# Patient Record
Sex: MALE | Race: WHITE | NOT HISPANIC OR LATINO | Employment: OTHER | URBAN - METROPOLITAN AREA
[De-identification: names, ages, dates, MRNs, and addresses within clinical notes are randomized per-mention and may not be internally consistent; named-entity substitution may affect disease eponyms.]

---

## 2017-07-12 DIAGNOSIS — I67.1 CEREBRAL ANEURYSM: ICD-10-CM

## 2017-07-12 NOTE — LETTER
[facility]  [address] [place], [place], [address]  [phone]        July 13, 2017    Vladimir Morse                                                                                                                                   [facility] [address] [facility] [address]            We recently received a call from your pharmacy requesting a refill of Ramipril.     A review of your chart indicates that an appointment is required with your provider for 6 month med check per Dr. Hinkle-was due 5/12/17. Please call the clinic at [phone] to schedule your appointment.     We have authorized one 30 day refill of your medication to allow time for you to schedule your appointment.      Taking care of your health is important to us and ongoing visits with your provider are vital to your care.  We look forward to seeing you in the near future.     Sincerely,     [facility]

## 2017-07-13 RX ORDER — RAMIPRIL 2.5 MG/1
CAPSULE ORAL
Qty: 30 CAPSULE | Refills: 0 | Status: SHIPPED | OUTPATIENT
Start: 2017-07-13 | End: 2017-07-21

## 2017-07-13 NOTE — TELEPHONE ENCOUNTER
"Not PSO diagnosis.  6 or 12 month visit?  Please advise.  Angelica Vera RN      Associated Diagnoses      Cerebral aneurysm [I67.1]             12/12/16  ASSESSMENT/PLAN:      1. Routine general medical examination at a health care facility  discsussed  - Hepatitis C Screen Reflex to HCV RNA Quant and Genotype     2. Cerebral aneurysm  Keep BP controlled  - ramipril (ALTACE) 2.5 MG capsule; Take 1 capsule (2.5 mg) by mouth At Bedtime  Dispense: 90 capsule; Refill: 1     3. Hyperlipidemia LDL goal <100  refill  - atorvastatin (LIPITOR) 40 MG tablet; Take 1 tablet (40 mg) by mouth daily Due for fasting physical  Dispense: 90 tablet; Refill: 3  - Lipid Profile (Chol, Trig, HDL, LDL calc)  - Comprehensive metabolic panel     4. Primary osteoarthritis of both knees  refill  - meloxicam (MOBIC) 15 MG tablet; Take 1 tablet (15 mg) by mouth daily  Dispense: 90 tablet; Refill: 1     5. Need for hepatitis C screening test  again  - Hepatitis C Screen Reflex to HCV RNA Quant and Genotype        COUNSELING:  Reviewed preventive health counseling, as reflected in patient instructions       Regular exercise       Healthy diet/nutrition       Vision screening     BP Screening:   Last 3 BP Readings:        BP Readings from Last 3 Encounters:   12/12/16 134/81   12/07/16 142/86   07/18/16 130/70         The following was recommended to the patient:  Re-screen BP within a year and recommended lifestyle modifications        reports that he has never smoked. He has never used smokeless tobacco.    Estimated body mass index is 30.3 kg/(m^2) as calculated from the following:    Height as of this encounter: 5' 8.1\" (1.73 m).    Weight as of this encounter: 199 lb 14.4 oz (90.674 kg).         Counseling Resources:  ATP IV Guidelines  Pooled Cohorts Equation Calculator  FRAX Risk Assessment  ICSI Preventive Guidelines  Dietary Guidelines for Americans, 2010  USDA's MyPlate  ASA Prophylaxis  Lung CA Screening     Reyes Hinkle, " MD  [facility]

## 2017-07-13 NOTE — TELEPHONE ENCOUNTER
ramipril (ALTACE) 2.5 MG capsule    Last Written Prescription Date: 12/12/2016  Last Fill Quantity: 90,04/21/2017 # refills: 1  Last Office Visit with [facility] or [facility] prescribing provider: 12/12/2016-Dr Hinkle       Potassium   Date Value Ref Range Status   12/12/2016 3.8 3.4 - 5.3 mmol/L Final     Creatinine   Date Value Ref Range Status   12/12/2016 0.66 0.66 - 1.25 mg/dL Final     BP Readings from Last 3 Encounters:   12/12/16 134/81   12/07/16 142/86   07/18/16 130/70

## 2017-07-21 ENCOUNTER — OFFICE VISIT (OUTPATIENT)
Dept: FAMILY MEDICINE | Facility: CLINIC | Age: 62
End: 2017-07-21
Payer: COMMERCIAL

## 2017-07-21 VITALS
TEMPERATURE: 97.6 F | DIASTOLIC BLOOD PRESSURE: 83 MMHG | HEART RATE: 56 BPM | BODY MASS INDEX: 30.37 KG/M2 | RESPIRATION RATE: 14 BRPM | HEIGHT: 68 IN | WEIGHT: 200.4 LBS | SYSTOLIC BLOOD PRESSURE: 138 MMHG

## 2017-07-21 DIAGNOSIS — E78.5 HYPERLIPIDEMIA LDL GOAL <100: ICD-10-CM

## 2017-07-21 DIAGNOSIS — D23.5 BENIGN NEOPLASM OF SKIN OF TRUNK, EXCEPT SCROTUM: ICD-10-CM

## 2017-07-21 DIAGNOSIS — M17.0 PRIMARY OSTEOARTHRITIS OF BOTH KNEES: Primary | ICD-10-CM

## 2017-07-21 DIAGNOSIS — I67.1 CEREBRAL ANEURYSM: ICD-10-CM

## 2017-07-21 PROCEDURE — 17110 DESTRUCTION B9 LES UP TO 14: CPT | Performed by: FAMILY MEDICINE

## 2017-07-21 PROCEDURE — 99213 OFFICE O/P EST LOW 20 MIN: CPT | Mod: 25 | Performed by: FAMILY MEDICINE

## 2017-07-21 RX ORDER — MELOXICAM 15 MG/1
15 TABLET ORAL DAILY
Qty: 90 TABLET | Refills: 1 | Status: SHIPPED | OUTPATIENT
Start: 2017-07-21 | End: 2018-02-22

## 2017-07-21 RX ORDER — ATORVASTATIN CALCIUM 40 MG/1
40 TABLET, FILM COATED ORAL DAILY
Qty: 90 TABLET | Refills: 1 | Status: SHIPPED | OUTPATIENT
Start: 2017-07-21 | End: 2018-04-30

## 2017-07-21 RX ORDER — RAMIPRIL 2.5 MG/1
CAPSULE ORAL
Qty: 90 CAPSULE | Refills: 1 | Status: SHIPPED | OUTPATIENT
Start: 2017-07-21 | End: 2018-01-26

## 2017-07-21 NOTE — PROGRESS NOTES
"  SUBJECTIVE:                                                    Vladimir Morse is a 61 year old male who presents to clinic today for the following health issues:      Medication Followup of  Ramipril     Taking Medication as prescribed: yes    Side Effects:  None    Medication Helping Symptoms:  yes         Would like to know your opinion on Double knee surgery. Ortho proposes same  Mole on rt temple     Benign neoplasm of skin of trunk, except scrotum  :The patient noticed a \"mole\" on his right temple. May be growing, duration unclear    Primary osteoarthritis of both knees: The patient had an ortho appointment where they suggested a double TKR, he was wondering whether it was a good idea.     Hyperlipidemia LDL goal <100: The patient is on a statin.       Cerebral aneurysm:excellent recovery    Problem list and histories reviewed & adjusted, as indicated.  Additional history: as documentedas documented        Reviewed and updated as needed this visit by clinical staffTobacco  Allergies  Meds  Med Hx  Surg Hx  Fam Hx  Soc Hx       Past Medical History:   Diagnosis Date     BPPV (benign paroxysmal positional vertigo), unspecified laterality 7/3/2015     Cerebral aneurysm 8/4/2011    atherosclerotic     DIVERTICULITIS OF COLON W/O BLEED 6/29/2006     Gilbert's syndrome 12/13/2016     Hyperlipidemia LDL goal <100 11/28/2014     Knee pain 2/6/2009     Lipoma of skin and subcutaneous tissue 6/22/2012     Nephrolithiasis 6/22/2012     Pain in joint, shoulder region 9/18/2010     Presbycusis 6/22/2012     SBO (small bowel obstruction) (H) 7/27/2012     TIA (transient ischaemic attack)      Unspecified intestinal obstruction (H)     diverticulitis       Past Surgical History:   Procedure Laterality Date     ARTHROSCOPY KNEE Right 10/26/2015    Procedure: ARTHROSCOPY KNEE;  Surgeon: Rodrigue Cole MD;  Location: [facility] OR     C NONSPECIFIC PROCEDURE      Perforated bowel; infant     COLONOSCOPY  11/23/2015    " "Dr. Freedman RED     CYSTOSCOPY, RETROGRADES, INSERT STENT URETER(S), COMBINED  6/29/2012    Procedure: COMBINED CYSTOSCOPY, RETROGRADES, INSERT STENT URETER(S);  LEFT URETEROSCOPY, LEFT URETERAL STENT PLACEMENT;  Surgeon: Timothy Ortega MD;  Location:  OR     EXTRACORPOREAL SHOCK WAVE LITHOTRIPSY (ESWL)  12/20/2012    Procedure: EXTRACORPOREAL SHOCK WAVE LITHOTRIPSY (ESWL);  LEFT EXTRACORPOREAL SHOCKWAVE LITHOTRIPSY ;  Surgeon: Timothy Ortega MD;  Location:  OR     ORTHOPEDIC SURGERY      knee cyst removed     TONSILLECTOMY      T&A as a child       Family History   Problem Relation Age of Onset     Breast Cancer Mother      Cancer - colorectal Father      Breast Cancer Sister      Colon Cancer No family hx of        Social History   Substance Use Topics     Smoking status: Never Smoker     Smokeless tobacco: Never Used     Alcohol use Yes      Comment: rarely       Reviewed and updated as needed this visit by Provider         ROS: Great mood, no itch bleeding    This document serves as a record of the services and decisions personally performed and made by Reyes Hinkle MD. It was created on his behalf by Marifer Gunderson, a trained medical scribe.  The creation of this document is based on the scribe's personal observations and the provider's statements to the medical scribe.  Marifer Gunderson, July 21, 2017 3:13 PM    OBJECTIVE:     /83 (BP Location: Right arm, Patient Position: Chair, Cuff Size: Adult Regular)  Pulse 56  Temp 97.6  F (36.4  C) (Oral)  Resp 14  Ht 1.73 m (5' 8.1\")  Wt 90.9 kg (200 lb 6.4 oz)  BMI 30.38 kg/m2  Body mass index is 30.38 kg/(m^2).    Exam:  Skin: Typical seborrheic keratosis with keratin plug on right temple    Procedure:  Cryotherapy applied  - Care discussed      ASSESSMENT/PLAN:           (M17.0) Primary osteoarthritis of both knees  Comment: discussed at length  Plan: meloxicam (MOBIC) 15 MG tablet            (E78.5) Hyperlipidemia LDL goal <100  Comment:   Recent " Labs   Lab Test  12/12/16   1129  10/12/15   1808  11/28/14   1148   CHOL  123  166  150   HDL  46  46  51   LDL  53  95  85   TRIG  119  125  71   CHOLHDLRATIO   --   3.6  2.9       Plan: atorvastatin (LIPITOR) 40 MG tablet            (I67.1) Cerebral aneurysm  Comment: refill Excellent BP  Plan: ramipril (ALTACE) 2.5 MG capsule            Piter K, nature, artie history, discussed Cryo applied          The information in this document, created by the medical scribe for me, accurately reflects the services I personally performed and the decisions made by me. I have reviewed and approved this document for accuracy prior to leaving the patient care area.  Reyes Hinkle MD July 21, 2017 3:14 PM    Reyes Hinkle MD  [facility]

## 2017-07-21 NOTE — NURSING NOTE
"Chief Complaint   Patient presents with     Recheck Medication     Ramipril     Knee left     wondering about double knee surgery, your thoughts      Derm Problem     mole on rt temple        Initial /83 (BP Location: Right arm, Patient Position: Chair, Cuff Size: Adult Regular)  Pulse 56  Temp 97.6  F (36.4  C) (Oral)  Resp 14  Ht 5' 8.1\" (1.73 m)  Wt 200 lb 6.4 oz (90.9 kg)  BMI 30.38 kg/m2 Estimated body mass index is 30.38 kg/(m^2) as calculated from the following:    Height as of this encounter: 5' 8.1\" (1.73 m).    Weight as of this encounter: 200 lb 6.4 oz (90.9 kg).  Medication Reconciliation: complete rt arm Magda Pop MA      "

## 2017-07-21 NOTE — MR AVS SNAPSHOT
"              After Visit Summary   2017    Vladimir Morse    MRN: [id number]           Patient Information     Date Of Birth          1955        Visit Information        Provider Department      2017 3:45 PM Reyes Hinkle MD [facility]        Today's Diagnoses     Primary osteoarthritis of both knees    -  1    Hyperlipidemia LDL goal <100        Cerebral aneurysm        Benign neoplasm of skin of trunk, except scrotum           Follow-ups after your visit        Who to contact     If you have questions or need follow up information about today's clinic visit or your schedule please contact [facility] directly at [phone].  Normal or non-critical lab and imaging results will be communicated to you by Valentin Uzhunhart, letter or phone within 4 business days after the clinic has received the results. If you do not hear from us within 7 days, please contact the clinic through MyChart or phone. If you have a critical or abnormal lab result, we will notify you by phone as soon as possible.  Submit refill requests through [employer] or call your pharmacy and they will forward the refill request to us. Please allow 3 business days for your refill to be completed.          Additional Information About Your Visit        MyChart Information     [employer] lets you send messages to your doctor, view your test results, renew your prescriptions, schedule appointments and more. To sign up, go to [email or web address] . Click on \"Log in\" on the left side of the screen, which will take you to the Welcome page. Then click on \"Sign up Now\" on the right side of the page.     You will be asked to enter the access code listed below, as well as some personal information. Please follow the directions to create your username and password.     Your access code is: [email or web address]  Expires: 10/19/2017  3:25 PM     Your access code will  in 90 days. If you need help or a new code, please call " "your [place] clinic or [phone].        Care EveryWhere ID     This is your Care EveryWhere ID. This could be used by other organizations to access your [place] medical records  [id number]        Your Vitals Were     Pulse Temperature Respirations Height BMI (Body Mass Index)       56 97.6  F (36.4  C) (Oral) 14 5' 8.1\" (1.73 m) 30.38 kg/m2        Blood Pressure from Last 3 Encounters:   07/21/17 138/83   12/12/16 134/81   12/07/16 142/86    Weight from Last 3 Encounters:   07/21/17 200 lb 6.4 oz (90.9 kg)   12/12/16 199 lb 14.4 oz (90.7 kg)   12/07/16 199 lb (90.3 kg)              We Performed the Following     DESTRUCT BENIGN LESION, UP TO 14          Today's Medication Changes          These changes are accurate as of: 7/21/17  8:43 PM.  If you have any questions, ask your nurse or doctor.               These medicines have changed or have updated prescriptions.        Dose/Directions    atorvastatin 40 MG tablet   Commonly known as:  LIPITOR   This may have changed:  additional instructions   Used for:  Hyperlipidemia LDL goal <100   Changed by:  Reyes Hinkle MD        Dose:  40 mg   Take 1 tablet (40 mg) by mouth daily   Quantity:  90 tablet   Refills:  1       ramipril 2.5 MG capsule   Commonly known as:  ALTACE   This may have changed:  See the new instructions.   Used for:  Cerebral aneurysm   Changed by:  Reyes Hinkle MD        TAKE ONE CAPSULE BY MOUTH AT BEDTIME   Quantity:  90 capsule   Refills:  1         Stop taking these medicines if you haven't already. Please contact your care team if you have questions.     traMADol 50 MG tablet   Commonly known as:  ULTRAM   Stopped by:  Reyes Hinkle MD                Where to get your medicines      These medications were sent to [facility] - [address] [address]     Phone:  [phone]     atorvastatin 40 MG tablet    meloxicam 15 MG tablet    ramipril 2.5 MG capsule                " Primary Care Provider Office Phone # Fax #    Reyes Hinkle [phone]       [facility] [address]  [facility] [address]        Equal Access to Services     LAURA GREENE : Clara rangel ku aguso Soomaali, waaxda luqadaha, qaybta kaalmada adeegyada, josselyn colen marie núñez laAdamariselaine vazquez. So [facility] [phone].    ATENCIÓN: Si habla español, tiene a wright disposición servicios gratuitos de asistencia lingüística. Llame al [phone].    We comply with applicable federal civil rights laws and [place] laws. We do not discriminate on the basis of race, color, national origin, age, disability sex, sexual orientation or gender identity.            Thank you!     Thank you for choosing [facility]  for your care. Our goal is always to provide you with excellent care. Hearing back from our patients is one way we can continue to improve our services. Please take a few minutes to complete the written survey that you may receive in the mail after your visit with us. Thank you!             Your Updated Medication List - Protect others around you: Learn how to safely use, store and throw away your medicines at www.disposemymeds.org.          This list is accurate as of: 7/21/17  8:43 PM.  Always use your most recent med list.                   Brand Name Dispense Instructions for use Diagnosis    aspirin 81 MG EC tablet      Take 81 mg by mouth daily.        atorvastatin 40 MG tablet    LIPITOR    90 tablet    Take 1 tablet (40 mg) by mouth daily    Hyperlipidemia LDL goal <100       IBUPROFEN PO           meloxicam 15 MG tablet    MOBIC    90 tablet    Take 1 tablet (15 mg) by mouth daily    Primary osteoarthritis of both knees       ramipril 2.5 MG capsule    ALTACE    90 capsule    TAKE ONE CAPSULE BY MOUTH AT BEDTIME    Cerebral aneurysm

## 2018-01-26 DIAGNOSIS — I67.1 CEREBRAL ANEURYSM: ICD-10-CM

## 2018-01-26 NOTE — LETTER
[facility]  [address] [place], [address]  [phone]        January 29, 2018    Vladimir Morse                                                                                                                                   [address] [facility] [address]            We recently received a call from your pharmacy requesting a refill of Ramipril.     You are due for a fasting lipid panel and a comprehensive metabolic panel.  I see you are coming for a pre-op 2/19/18.  Please come fasting to that appointment or reschedule to a time earlier in the day if 10 am too late for you to fast.  Please call the clinic at [phone] to reschedule your appointment.     We have authorized one refill of your medication to allow time for you to schedule your appointment.      Taking care of your health is important to us and ongoing visits with your provider are vital to your care.  We look forward to seeing you in the near future.     Sincerely,     [facility]

## 2018-01-27 NOTE — TELEPHONE ENCOUNTER
"Requested Prescriptions   Pending Prescriptions Disp Refills     ramipril (ALTACE) 2.5 MG capsule [Pharmacy Med Name: RAMIPRIL 2.5MG CAPS] 90 capsule 1    Last Written Prescription Date:  07/21/2017  Last Fill Quantity: 90 CAPSULE,  # refills: 1   Last Office Visit with [facility] provider:  07/21/2017   Future Office Visit:    Next 5 appointments (look out 90 days)     Feb 19, 2018 10:00 AM CST   Pre-Op physical with Reyes Hinkle MD   [facility]    [address] [place] [address] [phone]                  Sig: TAKE ONE CAPSULE BY MOUTH AT BEDTIME    ACE Inhibitors (Including Combos) Protocol Failed    1/26/2018  4:12 PM       Failed - Normal serum creatinine on file in past 12 months    Recent Labs   Lab Test  12/12/16   1129   CR  0.66            Failed - Normal serum potassium on file in past 12 months    Recent Labs   Lab Test  12/12/16   1129   POTASSIUM  3.8            Passed - Blood pressure under 140/90    BP Readings from Last 3 Encounters:   07/21/17 138/83   12/12/16 134/81   12/07/16 142/86                Passed - Recent or future visit with authorizing provider's specialty    Patient had office visit in the last year or has a visit in the next 30 days with authorizing provider.  See \"Patient Info\" tab in inbasket, or \"Choose Columns\" in Meds & Orders section of the refill encounter.            Passed - Patient is age 18 or older          Cristofer COLVIN  "

## 2018-01-29 RX ORDER — RAMIPRIL 2.5 MG/1
CAPSULE ORAL
Qty: 90 CAPSULE | Refills: 0 | Status: SHIPPED | OUTPATIENT
Start: 2018-01-29 | End: 2018-02-22

## 2018-01-29 NOTE — TELEPHONE ENCOUNTER
BMP was due 12/12/17.  Also was due for FLP 12/12/17.  Has pre-op scheduled for 2/19/18.  OK to fill til then and send letter to come fasting to appt for labs?  Please advise.  Angelica Vera RN

## 2018-02-22 ENCOUNTER — OFFICE VISIT (OUTPATIENT)
Dept: FAMILY MEDICINE | Facility: CLINIC | Age: 63
End: 2018-02-22
Payer: COMMERCIAL

## 2018-02-22 VITALS
TEMPERATURE: 97.6 F | SYSTOLIC BLOOD PRESSURE: 160 MMHG | HEIGHT: 68 IN | HEART RATE: 91 BPM | DIASTOLIC BLOOD PRESSURE: 93 MMHG | WEIGHT: 202 LBS | RESPIRATION RATE: 14 BRPM | BODY MASS INDEX: 30.62 KG/M2

## 2018-02-22 DIAGNOSIS — I67.1 CEREBRAL ANEURYSM: ICD-10-CM

## 2018-02-22 DIAGNOSIS — Z01.818 PREOP GENERAL PHYSICAL EXAM: Primary | ICD-10-CM

## 2018-02-22 DIAGNOSIS — I10 ESSENTIAL HYPERTENSION, BENIGN: ICD-10-CM

## 2018-02-22 DIAGNOSIS — E78.5 HYPERLIPIDEMIA LDL GOAL <100: ICD-10-CM

## 2018-02-22 LAB
ALBUMIN UR-MCNC: ABNORMAL MG/DL
APPEARANCE UR: CLEAR
BASOPHILS # BLD AUTO: 0 10E9/L (ref 0–0.2)
BASOPHILS NFR BLD AUTO: 0.3 %
BILIRUB UR QL STRIP: NEGATIVE
COLOR UR AUTO: YELLOW
DIFFERENTIAL METHOD BLD: NORMAL
EOSINOPHIL # BLD AUTO: 0.1 10E9/L (ref 0–0.7)
EOSINOPHIL NFR BLD AUTO: 0.9 %
ERYTHROCYTE [DISTWIDTH] IN BLOOD BY AUTOMATED COUNT: 14 % (ref 10–15)
GLUCOSE UR STRIP-MCNC: NEGATIVE MG/DL
HCT VFR BLD AUTO: 48.6 % (ref 40–53)
HGB BLD-MCNC: 15.5 G/DL (ref 13.3–17.7)
HGB UR QL STRIP: ABNORMAL
KETONES UR STRIP-MCNC: ABNORMAL MG/DL
LEUKOCYTE ESTERASE UR QL STRIP: NEGATIVE
LYMPHOCYTES # BLD AUTO: 2.5 10E9/L (ref 0.8–5.3)
LYMPHOCYTES NFR BLD AUTO: 23.7 %
MCH RBC QN AUTO: 29.2 PG (ref 26.5–33)
MCHC RBC AUTO-ENTMCNC: 31.9 G/DL (ref 31.5–36.5)
MCV RBC AUTO: 92 FL (ref 78–100)
MONOCYTES # BLD AUTO: 0.6 10E9/L (ref 0–1.3)
MONOCYTES NFR BLD AUTO: 5.9 %
MUCOUS THREADS #/AREA URNS LPF: PRESENT /LPF
NEUTROPHILS # BLD AUTO: 7.2 10E9/L (ref 1.6–8.3)
NEUTROPHILS NFR BLD AUTO: 69.2 %
NITRATE UR QL: NEGATIVE
PH UR STRIP: 6 PH (ref 5–7)
PLATELET # BLD AUTO: 273 10E9/L (ref 150–450)
RBC # BLD AUTO: 5.31 10E12/L (ref 4.4–5.9)
RBC #/AREA URNS AUTO: ABNORMAL /HPF
SOURCE: ABNORMAL
SP GR UR STRIP: >1.03 (ref 1–1.03)
UROBILINOGEN UR STRIP-ACNC: 0.2 EU/DL (ref 0.2–1)
WBC # BLD AUTO: 10.4 10E9/L (ref 4–11)
WBC #/AREA URNS AUTO: ABNORMAL /HPF

## 2018-02-22 PROCEDURE — 36415 COLL VENOUS BLD VENIPUNCTURE: CPT | Performed by: FAMILY MEDICINE

## 2018-02-22 PROCEDURE — 93000 ELECTROCARDIOGRAM COMPLETE: CPT | Performed by: FAMILY MEDICINE

## 2018-02-22 PROCEDURE — 99214 OFFICE O/P EST MOD 30 MIN: CPT | Performed by: FAMILY MEDICINE

## 2018-02-22 PROCEDURE — 85025 COMPLETE CBC W/AUTO DIFF WBC: CPT | Performed by: FAMILY MEDICINE

## 2018-02-22 PROCEDURE — 80053 COMPREHEN METABOLIC PANEL: CPT | Performed by: FAMILY MEDICINE

## 2018-02-22 PROCEDURE — 81001 URINALYSIS AUTO W/SCOPE: CPT | Performed by: FAMILY MEDICINE

## 2018-02-22 PROCEDURE — 80061 LIPID PANEL: CPT | Performed by: FAMILY MEDICINE

## 2018-02-22 RX ORDER — RAMIPRIL 5 MG/1
5 CAPSULE ORAL DAILY
Qty: 90 CAPSULE | Refills: 1 | Status: SHIPPED | OUTPATIENT
Start: 2018-02-22 | End: 2018-04-13

## 2018-02-22 NOTE — PATIENT INSTRUCTIONS
Before Your Surgery      Call your surgeon if there is any change in your health. This includes signs of a cold or flu (such as a sore throat, runny nose, cough, rash or fever).    Do not smoke, drink alcohol or take over the counter medicine (unless your surgeon or primary care doctor tells you to) for the 24 hours before and after surgery.    If you take prescribed drugs: Follow your doctor s orders about which medicines to take and which to stop until after surgery.    Eating and drinking prior to surgery: follow the instructions from your surgeon    Take a shower or bath the night before surgery. Use the soap your surgeon gave you to gently clean your skin. If you do not have soap from your surgeon, use your regular soap. Do not shave or scrub the surgery site.  Wear clean pajamas and have clean sheets on your bed.     Stop ASA 5 dasy before    No meds AM of surgery

## 2018-02-22 NOTE — PROGRESS NOTES
[facility]  [address] [place] [id number] [phone]  Dept: [phone]    PRE-OP EVALUATION:  Today's date: 2018    Vladimir Morse (: 1955) presents for pre-operative evaluation assessment as requested by Dr. Lee.  He requires evaluation and anesthesia risk assessment prior to undergoing surgery/procedure for treatment of rt knee .    [phone]  Primary Physician: Reyes Hinkle  Type of Anesthesia Anticipated: General    Patient has a Health Care Directive or Living Will:  NO    Preop Questions 2018   Who is doing your surgery? dr les lee   [facility]   Date of Surgery/Procedure: 2018   1.  Do you have a history of Heart attack, stroke, stent, coronary bypass surgery, or other heart surgery? No   2.  Do you ever have any pain or discomfort in your chest? No   3.  Do you have a history of  Heart Failure? No   4.   Are you troubled by shortness of breath when:  walking on a level surface, or up a slight hill, or at night? No   5.  Do you currently have a cold, bronchitis or other respiratory infection? No   6.  Do you have a cough, shortness of breath, or wheezing? No   7.  Do you sometimes get pains in the calves of your legs when you walk? No   8. Do you or anyone in your family have previous history of blood clots? No   9.  Do you or does anyone in your family have a serious bleeding problem such as prolonged bleeding following surgeries or cuts? No   10. Have you ever had problems with anemia or been told to take iron pills? No   11. Have you had any abnormal blood loss such as black, tarry or bloody stools? No   12. Have you ever had a blood transfusion? No   13. Have you or any of your relatives ever had problems with anesthesia? No   14. Do you have sleep apnea, excessive snoring or daytime drowsiness? No   15. Do you have any prosthetic heart valves? No   16. Do you have prosthetic joints? No          HPI:     HPI related to upcoming procedure: Right knee arthroplasty      MEDICAL HISTORY:     Patient Active Problem List    Diagnosis Date Noted     Essential hypertension, benign 02/22/2018     Priority: Medium     Gilbert's syndrome 12/13/2016     Priority: Medium     BPPV (benign paroxysmal positional vertigo), unspecified laterality 07/03/2015     Priority: Medium     Hyperlipidemia LDL goal <100 11/28/2014     Priority: Medium     9.2 % 10 yr risk 10/2015       Advanced directives, counseling/discussion 08/16/2012     Priority: Medium     Discussed advance care planning with patient; however, patient declined at this time. 8/16/2012          Nephrolithiasis 06/22/2012     Priority: Medium     Presbycusis 06/22/2012     Priority: Medium     Cerebral aneurysm 08/04/2011     Priority: Medium     Diverticulitis of colon 06/29/2006     Priority: Medium     Problem list name updated by automated process. Provider to review        Past Medical History:   Diagnosis Date     BPPV (benign paroxysmal positional vertigo), unspecified laterality 7/3/2015     Cerebral aneurysm 8/4/2011    atherosclerotic     DIVERTICULITIS OF COLON W/O BLEED 6/29/2006     Essential hypertension, benign 2/22/2018     Gilbert's syndrome 12/13/2016     Hyperlipidemia LDL goal <100 11/28/2014     Knee pain 2/6/2009     Lipoma of skin and subcutaneous tissue 6/22/2012     Nephrolithiasis 6/22/2012     Pain in joint, shoulder region 9/18/2010     Presbycusis 6/22/2012     SBO (small bowel obstruction) 7/27/2012     TIA (transient ischaemic attack)      Unspecified intestinal obstruction     diverticulitis     Past Surgical History:   Procedure Laterality Date     ARTHROSCOPY KNEE Right 10/26/2015    Procedure: ARTHROSCOPY KNEE;  Surgeon: Rodrigue Cloe MD;  Location: [facility] OR     C NONSPECIFIC PROCEDURE      Perforated bowel; infant     COLONOSCOPY  11/23/2015    Dr. Tano OLMEDO     CYSTOSCOPY, RETROGRADES, INSERT STENT URETER(S),  COMBINED  6/29/2012    Procedure: COMBINED CYSTOSCOPY, RETROGRADES, INSERT STENT URETER(S);  LEFT URETEROSCOPY, LEFT URETERAL STENT PLACEMENT;  Surgeon: Timothy Ortega MD;  Location:  OR     EXTRACORPOREAL SHOCK WAVE LITHOTRIPSY (ESWL)  12/20/2012    Procedure: EXTRACORPOREAL SHOCK WAVE LITHOTRIPSY (ESWL);  LEFT EXTRACORPOREAL SHOCKWAVE LITHOTRIPSY ;  Surgeon: Timothy Ortega MD;  Location:  OR     ORTHOPEDIC SURGERY      knee cyst removed     TONSILLECTOMY      T&A as a child     Current Outpatient Prescriptions   Medication Sig Dispense Refill     ramipril (ALTACE) 5 MG capsule Take 1 capsule (5 mg) by mouth daily 90 capsule 1     atorvastatin (LIPITOR) 40 MG tablet Take 1 tablet (40 mg) by mouth daily 90 tablet 1     IBUPROFEN PO        aspirin 81 MG EC tablet Take 81 mg by mouth daily.         [DISCONTINUED] ramipril (ALTACE) 2.5 MG capsule TAKE ONE CAPSULE BY MOUTH AT BEDTIME 90 capsule 0     OTC products: None, except as noted above    Allergies   Allergen Reactions     No Known Drug Allergies       Latex Allergy: NO    Social History   Substance Use Topics     Smoking status: Never Smoker     Smokeless tobacco: Never Used     Alcohol use Yes      Comment: rarely     History   Drug Use No       REVIEW OF SYSTEMS:   CONSTITUTIONAL: NEGATIVE for fever, chills, change in weight  EYES: NEGATIVE for vision changes or irritation  ENT/MOUTH: NEGATIVE for ear, mouth and throat problems  RESP: NEGATIVE for significant cough or SOB  CV: NEGATIVE for chest pain, palpitations or peripheral edema  GI: NEGATIVE for nausea, abdominal pain, heartburn, or change in bowel habits  : NEGATIVE for frequency, dysuria, or hematuria  MUSCULOSKELETAL: NEGATIVE for significant arthralgias or myalgia  NEURO: NEGATIVE for weakness, dizziness or paresthesias  HEME: NEGATIVE for bleeding problems  PSYCHIATRIC: NEGATIVE for changes in mood or affect    This document serves as a record of the services and decisions personally  "performed and made by Reyes Hinkle MD. It was created on his behalf by Marifer Gunderson, a trained medical scribe.  The creation of this document is based on the scribe's personal observations and the provider's statements to the medical scribe.  Marifer Gunderson, February 22, 2018 2:55 PM    EXAM:   BP (!) 160/93 (BP Location: Right arm, Patient Position: Chair, Cuff Size: Adult Regular)  Pulse 91  Temp 97.6  F (36.4  C) (Oral)  Resp 14  Ht 1.73 m (5' 8.1\")  Wt 91.6 kg (202 lb)  BMI 30.62 kg/m2    GENERAL APPEARANCE: healthy, alert and no distress     EYES: EOMI,  PERRL     HENT: ear canals and TM's normal and nose and mouth without ulcers or lesions     NECK: no adenopathy, no asymmetry, masses, or scars and thyroid normal to palpation     RESP: lungs clear to auscultation - no rales, rhonchi or wheezes     CV: regular rates and rhythm, normal S1 S2, no S3 or S4 and no murmur, click or rub     ABDOMEN:  soft, nontender, no HSM or masses and bowel sounds normal     MS: extremities normal- no gross deformities noted, no evidence of inflammation in joints, FROM in all extremities.     SKIN: no suspicious lesions or rashes     PSYCH: mentation appears normal. and affect normal/bright      DIAGNOSTICS:     EKG: sinus bradycardia (59 bpm), left axis, normal intervals, no acute ST/T changes c/w ischemia, no LVH by voltage criteria,     Labs Drawn and in Process:   Unresulted Labs Ordered in the Past 30 Days of this Admission     Date and Time Order Name Status Description    2/22/2018 1523 URINE MICROSCOPIC In process     2/22/2018 1513 LIPID REFLEX TO DIRECT LDL PANEL In process     2/22/2018 1513 COMPREHENSIVE METABOLIC PANEL In process           Recent Labs   Lab Test  12/12/16   1129  10/12/15   1807   09/03/13   1456   HGB  14.9  14.5   --    --    PLT  294  256   --    --    NA  141  143   < >   --    POTASSIUM  3.8  4.1   < >   --    CR  0.66  0.80   < >   --    A1C   --    --    --   5.3    < > = values in this " interval not displayed.        IMPRESSION:   Reason for surgery/procedure: right knee osteoarthritis  Diagnosis/reason for consult: Assess perioperative risk     The proposed surgical procedure is considered INTERMEDIATE risk.    REVISED CARDIAC RISK INDEX  The patient has the following serious cardiovascular risks for perioperative complications such as (MI, PE, VFib and 3  AV Block):  Cerebrovascular Disease (TIA or CVA)  INTERPRETATION: 1 risks: Class II (low risk - 0.9% complication rate)    The patient has the following additional risks for perioperative complications:  Elevated blood pressure      ICD-10-CM    1. Preop general physical exam Z01.818 CBC with platelets and differential     EKG 12-lead complete w/read - Clinics     *UA reflex to Microscopic and Culture ([facility] (except [place] and [place])     Urine Microscopic   2. Hyperlipidemia LDL goal <100 E78.5 Comprehensive metabolic panel     Lipid panel reflex to direct LDL Fasting   3. Cerebral aneurysm I67.1 ramipril (ALTACE) 5 MG capsule   4. Essential hypertension, benign I10 ramipril (ALTACE) 5 MG capsule       RECOMMENDATIONS:         Hold aspirin 5 days before surgery, hold all medications morning of surgery    APPROVAL GIVEN to proceed with proposed procedure, without further diagnostic evaluation   Ramipril has been increased    Signed Electronically by: Reyes Hinkle MD    Copy of this evaluation report is provided to requesting physician.    [facility] Preop Guidelines  The information in this document, created by the medical scribe for me, accurately reflects the services I personally performed and the decisions made by me. I have reviewed and approved this document for accuracy prior to leaving the patient care area.  Reyes Hinkle MD February 22, 2018 2:55 PM

## 2018-02-22 NOTE — NURSING NOTE
"Chief Complaint   Patient presents with     Pre-Op Exam     3/5/18 rt knee and is fasting        Initial BP (!) 160/93 (BP Location: Right arm, Patient Position: Chair, Cuff Size: Adult Regular)  Pulse 91  Temp 97.6  F (36.4  C) (Oral)  Resp 14  Ht 5' 8.1\" (1.73 m)  Wt 202 lb (91.6 kg)  BMI 30.62 kg/m2 Estimated body mass index is 30.62 kg/(m^2) as calculated from the following:    Height as of this encounter: 5' 8.1\" (1.73 m).    Weight as of this encounter: 202 lb (91.6 kg).  Medication Reconciliation: complete rt arm Magda Pop MA          "

## 2018-02-22 NOTE — MR AVS SNAPSHOT
After Visit Summary   2/22/2018    Vladimir Morse    MRN: [id number]           Patient Information     Date Of Birth          1955        Visit Information        Provider Department      2/22/2018 3:30 PM Reyes Hinkle MD [facility]        Today's Diagnoses     Preop general physical exam    -  1    Hyperlipidemia LDL goal <100        Cerebral aneurysm        Essential hypertension, benign          Care Instructions      Before Your Surgery      Call your surgeon if there is any change in your health. This includes signs of a cold or flu (such as a sore throat, runny nose, cough, rash or fever).    Do not smoke, drink alcohol or take over the counter medicine (unless your surgeon or primary care doctor tells you to) for the 24 hours before and after surgery.    If you take prescribed drugs: Follow your doctor s orders about which medicines to take and which to stop until after surgery.    Eating and drinking prior to surgery: follow the instructions from your surgeon    Take a shower or bath the night before surgery. Use the soap your surgeon gave you to gently clean your skin. If you do not have soap from your surgeon, use your regular soap. Do not shave or scrub the surgery site.  Wear clean pajamas and have clean sheets on your bed.     Stop ASA 5 dasy before    No meds AM of surgery          Follow-ups after your visit        Who to contact     If you have questions or need follow up information about today's clinic visit or your schedule please contact [facility] directly at [phone].  Normal or non-critical lab and imaging results will be communicated to you by MyChart, letter or phone within 4 business days after the clinic has received the results. If you do not hear from us within 7 days, please contact the clinic through MyChart or phone. If you have a critical or abnormal lab result, we will notify you by phone as soon as possible.  Submit  "refill requests through VersionOne or call your pharmacy and they will forward the refill request to us. Please allow 3 business days for your refill to be completed.          Additional Information About Your Visit        Basic6hart Information     VersionOne lets you send messages to your doctor, view your test results, renew your prescriptions, schedule appointments and more. To sign up, go to [email or web address] . Click on \"Log in\" on the left side of the screen, which will take you to the Welcome page. Then click on \"Sign up Now\" on the right side of the page.     You will be asked to enter the access code listed below, as well as some personal information. Please follow the directions to create your username and password.     Your access code is: [email or web address]  Expires: 2018  3:51 PM     Your access code will  in 90 days. If you need help or a new code, please call your [facility] clinic or [phone].        Care EveryWhere ID     This is your Care EveryWhere ID. This could be used by other organizations to access your [facility] medical records  [id number]        Your Vitals Were     Pulse Temperature Respirations Height BMI (Body Mass Index)       91 97.6  F (36.4  C) (Oral) 14 5' 8.1\" (1.73 m) 30.62 kg/m2        Blood Pressure from Last 3 Encounters:   18 (!) 160/93   17 138/83   16 134/81    Weight from Last 3 Encounters:   18 202 lb (91.6 kg)   17 200 lb 6.4 oz (90.9 kg)   16 199 lb 14.4 oz (90.7 kg)              We Performed the Following     *UA reflex to Microscopic and Culture ([facility] (except [place] and [place])     CBC with platelets and differential     Comprehensive metabolic panel     EKG 12-lead complete w/read - Clinics     Lipid panel reflex to direct LDL Fasting     Urine Microscopic          Today's Medication Changes          These changes are accurate as of 18  3:51 PM.  If you have any questions, ask your nurse or " doctor.               These medicines have changed or have updated prescriptions.        Dose/Directions    ramipril 5 MG capsule   Commonly known as:  ALTACE   This may have changed:  See the new instructions.   Used for:  Cerebral aneurysm, Essential hypertension, benign   Changed by:  Reyes Hinkle MD        Dose:  5 mg   Take 1 capsule (5 mg) by mouth daily   Quantity:  90 capsule   Refills:  1            Where to get your medicines      These medications were sent to [facility] - [facility] [address], [facility] [address]     Phone:  [phone]     ramipril 5 MG capsule                Primary Care Provider Office Phone # Fax #    Reyes Hinkle [phone] [address] [facility] [address]        Equal Access to Services     [facility]: Hadii claire menard hadasho Soomaali, waaxda luqadaha, qaybta kaalmada adeegyada, waxay idiin hayelaine oseguera . So [facility] [phone].    ATENCIÓN: Si habla español, tiene a wright disposición servicios gratuitos de asistencia lingüística. [facility] [phone].    We comply with applicable federal civil rights laws and [place] laws. We do not discriminate on the basis of race, color, national origin, age, disability, sex, sexual orientation, or gender identity.            Thank you!     Thank you for choosing [facility]  for your care. Our goal is always to provide you with excellent care. Hearing back from our patients is one way we can continue to improve our services. Please take a few minutes to complete the written survey that you may receive in the mail after your visit with us. Thank you!             Your Updated Medication List - Protect others around you: Learn how to safely use, store and throw away your medicines at www.disposemymeds.org.          This list is accurate as of 2/22/18  3:51 PM.  Always use your most recent med list.                   Brand Name Dispense  Instructions for use Diagnosis    aspirin 81 MG EC tablet      Take 81 mg by mouth daily.        atorvastatin 40 MG tablet    LIPITOR    90 tablet    Take 1 tablet (40 mg) by mouth daily    Hyperlipidemia LDL goal <100       IBUPROFEN PO           ramipril 5 MG capsule    ALTACE    90 capsule    Take 1 capsule (5 mg) by mouth daily    Cerebral aneurysm, Essential hypertension, benign

## 2018-02-22 NOTE — LETTER
February 26, 2018      Vladimir BLANCO Morse  [address] [place] [facility] [address]        Dear ,    We are writing to inform you of your test results.    Tests are all OK. You do need to drink more water, though. Those abnormalities are not troublesome     Resulted Orders   Comprehensive metabolic panel   Result Value Ref [facility]    Sodium 141 133 - 144 mmol/L    Potassium 3.9 3.4 - 5.3 mmol/L    Chloride 106 94 - 109 mmol/L    Carbon Dioxide 31 20 - 32 mmol/L    Anion Gap 4 3 - 14 mmol/L    Glucose 77 70 - 99 mg/dL    Urea Nitrogen 16 7 - 30 mg/dL    Creatinine 0.70 0.66 - 1.25 mg/dL    GFR Estimate >90 >60 mL/min/1.7m2      Comment:      Non  GFR Calc    GFR Estimate If Black >90 >60 mL/min/1.7m2      Comment:       GFR Calc    Calcium 8.7 8.5 - 10.1 mg/dL    Bilirubin Total 2.6 (H) 0.2 - 1.3 mg/dL    Albumin 4.2 3.4 - 5.0 g/dL    Protein Total 7.6 6.8 - 8.8 g/dL    Alkaline Phosphatase 112 40 - 150 U/L    ALT 53 0 - 70 U/L    AST 31 0 - 45 U/L   CBC with platelets and differential   Result Value Ref [facility]    WBC 10.4 4.0 - 11.0 10e9/L    RBC Count 5.31 4.4 - 5.9 10e12/L    Hemoglobin 15.5 13.3 - 17.7 g/dL    Hematocrit 48.6 40.0 - 53.0 %    MCV 92 78 - 100 fl    MCH 29.2 26.5 - 33.0 pg    MCHC 31.9 31.5 - 36.5 g/dL    RDW 14.0 10.0 - 15.0 %    Platelet Count 273 150 - 450 10e9/L    Diff Method Automated Method     % Neutrophils 69.2 %    % Lymphocytes 23.7 %    % Monocytes 5.9 %    % Eosinophils 0.9 %    % Basophils 0.3 %    Absolute Neutrophil 7.2 1.6 - 8.3 10e9/L    Absolute Lymphocytes 2.5 0.8 - 5.3 10e9/L    Absolute Monocytes 0.6 0.0 - 1.3 10e9/L    Absolute Eosinophils 0.1 0.0 - 0.7 10e9/L    Absolute Basophils 0.0 0.0 - 0.2 10e9/L   *UA reflex to Microscopic and Culture ([facility] (except [place] and [place])   Result Value Ref [facility]    Color Urine Yellow     Appearance Urine Clear     Glucose Urine Negative NEG^Negative mg/dL    Bilirubin Urine  Negative NEG^Negative    Ketones Urine Trace (A) NEG^Negative mg/dL    Specific Gravity Urine >1.030 1.003 - 1.035    Blood Urine Trace (A) NEG^Negative    pH Urine 6.0 5.0 - 7.0 pH    Protein Albumin Urine Trace (A) NEG^Negative mg/dL    Urobilinogen Urine 0.2 0.2 - 1.0 EU/dL    Nitrite Urine Negative NEG^Negative    Leukocyte Esterase Urine Negative NEG^Negative    Source Midstream Urine    Lipid panel reflex to direct LDL Fasting   Result Value Ref [facility]    Cholesterol 127 <200 mg/dL    Triglycerides 91 <150 mg/dL    HDL Cholesterol 48 >39 mg/dL    LDL Cholesterol Calculated 61 <100 mg/dL      Comment:      Desirable:       <100 mg/dl    Non HDL Cholesterol 79 <130 mg/dL   Urine Microscopic   Result Value Ref [facility]    WBC Urine O - 2 OTO2^O - 2 /HPF    RBC Urine 2-5 (A) OTO2^O - 2 /HPF    Mucous Urine Present (A) NEG^Negative /LPF       If you have any questions or concerns, please call the clinic at the number listed above.       Sincerely,        Reyes Hinkle MD/lf

## 2018-02-24 LAB
ALBUMIN SERPL-MCNC: 4.2 G/DL (ref 3.4–5)
ALP SERPL-CCNC: 112 U/L (ref 40–150)
ALT SERPL W P-5'-P-CCNC: 53 U/L (ref 0–70)
ANION GAP SERPL CALCULATED.3IONS-SCNC: 4 MMOL/L (ref 3–14)
AST SERPL W P-5'-P-CCNC: 31 U/L (ref 0–45)
BILIRUB SERPL-MCNC: 2.6 MG/DL (ref 0.2–1.3)
BUN SERPL-MCNC: 16 MG/DL (ref 7–30)
CALCIUM SERPL-MCNC: 8.7 MG/DL (ref 8.5–10.1)
CHLORIDE SERPL-SCNC: 106 MMOL/L (ref 94–109)
CHOLEST SERPL-MCNC: 127 MG/DL
CO2 SERPL-SCNC: 31 MMOL/L (ref 20–32)
CREAT SERPL-MCNC: 0.7 MG/DL (ref 0.66–1.25)
GFR SERPL CREATININE-BSD FRML MDRD: >90 ML/MIN/1.7M2
GLUCOSE SERPL-MCNC: 77 MG/DL (ref 70–99)
HDLC SERPL-MCNC: 48 MG/DL
LDLC SERPL CALC-MCNC: 61 MG/DL
NONHDLC SERPL-MCNC: 79 MG/DL
POTASSIUM SERPL-SCNC: 3.9 MMOL/L (ref 3.4–5.3)
PROT SERPL-MCNC: 7.6 G/DL (ref 6.8–8.8)
SODIUM SERPL-SCNC: 141 MMOL/L (ref 133–144)
TRIGL SERPL-MCNC: 91 MG/DL

## 2018-02-26 NOTE — PROGRESS NOTES
Tests are all OK. You do need to drink more water, though. Those abnormalities are not troublesome  SAPNA RIVERA

## 2018-03-28 ENCOUNTER — TELEPHONE (OUTPATIENT)
Dept: FAMILY MEDICINE | Facility: CLINIC | Age: 63
End: 2018-03-28

## 2018-03-28 NOTE — TELEPHONE ENCOUNTER
Panel Management Review      Patient has the following on his problem list:     Hypertension   Last three blood pressure readings:  BP Readings from Last 3 Encounters:   02/22/18 (!) 160/93   07/21/17 138/83   12/12/16 134/81     Blood pressure: FAILED    HTN Guidelines:  Age 18-59 BP range:  Less than 140/90  Age 60-85 with Diabetes:  Less than 140/90  Age 60-85 without Diabetes:  less than 150/90      Composite cancer screening  Chart review shows that this patient is due/due soon for the following None  Summary:    Patient is due/failing the following:   BP CHECK    Action needed:   Patient needs nurse only appointment.    Type of outreach:    Phone, spoke to patient.  scheduled bp check    Questions for provider review:    None                                                                                                                                    Shari Schoenberger, CMA

## 2018-04-02 ENCOUNTER — ALLIED HEALTH/NURSE VISIT (OUTPATIENT)
Dept: NURSING | Facility: CLINIC | Age: 63
End: 2018-04-02
Payer: COMMERCIAL

## 2018-04-02 VITALS — DIASTOLIC BLOOD PRESSURE: 82 MMHG | HEART RATE: 70 BPM | SYSTOLIC BLOOD PRESSURE: 138 MMHG

## 2018-04-02 DIAGNOSIS — I10 ESSENTIAL HYPERTENSION, BENIGN: Primary | ICD-10-CM

## 2018-04-02 PROCEDURE — 99207 ZZC NO CHARGE NURSE ONLY: CPT

## 2018-04-02 NOTE — NURSING NOTE
Vladimir Morse is a 62 year old male who comes in today for a Blood Pressure check because of medication change and ongoing blood pressure monitoring.    BP: 146/86  P: 70    2nd BP reading after 10 minutes waiting:  BP: 138/82     Vitals as recorded, a large cuff was used.    Patient is not taking medication as prescribed, patient thinks he is still taking 1 tab of Ramipril 2.5 mg daily, as he has not yet picked up the 5 mg capsule from the pharmacy   Patient is tolerating medications well.  Patient is monitoring Blood Pressure at home.  Average readings if yes are 130's/70's on average    Current complaints: none    Disposition: Patient would like to know if he should continue taking Ramipril 2.5 mg daily? Or should he start taking Ramipril 5 mg daily.  Patient would like a call or letter with direction, Dr. Hinkle, please advise, thank you  Jack Pinto MA

## 2018-04-02 NOTE — MR AVS SNAPSHOT
"              After Visit Summary   2018    Vladimir Morse    MRN: [id number]           Patient Information     Date Of Birth          1955        Visit Information        Provider Department      2018 11:00 AM MAYCOL CLARK/LPN [facility]        Today's Diagnoses     Essential hypertension, benign    -  1       Follow-ups after your visit        Who to contact     If you have questions or need follow up information about today's clinic visit or your schedule please contact [facility] directly at [phone].  Normal or non-critical lab and imaging results will be communicated to you by MyChart, letter or phone within 4 business days after the clinic has received the results. If you do not hear from us within 7 days, please contact the clinic through Yur[employer] or phone. If you have a critical or abnormal lab result, we will notify you by phone as soon as possible.  Submit refill requests through [employer] or call your pharmacy and they will forward the refill request to us. Please allow 3 business days for your refill to be completed.          Additional Information About Your Visit        MyChart Information     Hook Mobile lets you send messages to your doctor, view your test results, renew your prescriptions, schedule appointments and more. To sign up, go to [email or web address] . Click on \"Log in\" on the left side of the screen, which will take you to the Welcome page. Then click on \"Sign up Now\" on the right side of the page.     You will be asked to enter the access code listed below, as well as some personal information. Please follow the directions to create your username and password.     Your access code is: [email or web address]  Expires: 2018  4:51 PM     Your access code will  in 90 days. If you need help or a new code, please call your [facility] or [phone].        Care EveryWhere ID     This is your Care EveryWhere ID. This could be used by other " organizations to access your [facility] medical records  [id number]        Your Vitals Were     Pulse                   70            Blood Pressure from Last 3 Encounters:   04/02/18 138/82   02/22/18 (!) 160/93   07/21/17 138/83    Weight from Last 3 Encounters:   02/22/18 202 lb (91.6 kg)   07/21/17 200 lb 6.4 oz (90.9 kg)   12/12/16 199 lb 14.4 oz (90.7 kg)              Today, you had the following     No orders found for display       Primary Care Provider Office Phone # Fax #    Reyes Hinkle [phone] [address] [facility] [address]        Equal Access to Services     [facility] : Hadii claire goldsmitho Solesa, waaxda luqadaha, qaybta kaalmada adeegyada, josselyn oseguera . So [facility] [phone].    ATENCIÓN: Si habla español, tiene a wright disposición servicios gratuitos de asistencia lingüística. Llame al [phone].    We comply with applicable federal civil rights laws and [place] laws. We do not discriminate on the basis of race, color, national origin, age, disability, sex, sexual orientation, or gender identity.            Thank you!     Thank you for choosing [facility]  for your care. Our goal is always to provide you with excellent care. Hearing back from our patients is one way we can continue to improve our services. Please take a few minutes to complete the written survey that you may receive in the mail after your visit with us. Thank you!             Your Updated Medication List - Protect others around you: Learn how to safely use, store and throw away your medicines at www.disposemymeds.org.          This list is accurate as of 4/2/18 11:34 AM.  Always use your most recent med list.                   Brand Name Dispense Instructions for use Diagnosis    aspirin 81 MG EC tablet      Take 81 mg by mouth daily.        atorvastatin 40 MG tablet    LIPITOR    90 tablet    Take 1 tablet (40 mg) by mouth daily     Hyperlipidemia LDL goal <100       IBUPROFEN PO           ramipril 5 MG capsule    ALTACE    90 capsule    Take 1 capsule (5 mg) by mouth daily    Cerebral aneurysm, Essential hypertension, benign

## 2018-04-13 ENCOUNTER — TELEPHONE (OUTPATIENT)
Dept: FAMILY MEDICINE | Facility: CLINIC | Age: 63
End: 2018-04-13

## 2018-04-13 DIAGNOSIS — I10 ESSENTIAL HYPERTENSION, BENIGN: ICD-10-CM

## 2018-04-13 DIAGNOSIS — I67.1 CEREBRAL ANEURYSM: ICD-10-CM

## 2018-04-13 RX ORDER — RAMIPRIL 2.5 MG/1
2.5 CAPSULE ORAL DAILY
Qty: 90 CAPSULE | Refills: 1 | Status: SHIPPED | OUTPATIENT
Start: 2018-04-13 | End: 2018-06-15

## 2018-04-13 NOTE — TELEPHONE ENCOUNTER
Pt calls re 4/2/18 nurse visit. Please review and advise.   BP Readings from Last 1 Encounters:   04/02/18 138/82     Disposition: Patient would like to know if he should continue taking Ramipril 2.5 mg daily? Or should he start taking Ramipril 5 mg daily.  Patient would like a call or letter with direction, Dr. Hinkle, please advise, thank you  Jack Pinto MA      Please advise, Dr. Paz.   Fernando Brownlee RN

## 2018-04-13 NOTE — TELEPHONE ENCOUNTER
At this time blood pressure is considered adequately controlled.  That may change in future, but 2.5 ramipril seems sufficient  Reyes Hinkle

## 2018-04-13 NOTE — TELEPHONE ENCOUNTER
Informed patient. He requested medication tablet change so he does not have to cut the 5mg tablet in half.    Prescription updated to reflect 2/5mg daily dose. Sent to pharmacy.    No further questions.     Melissa JONAS RN, BSN, PHN  [place] [facility] RN

## 2018-04-30 DIAGNOSIS — E78.5 HYPERLIPIDEMIA LDL GOAL <100: ICD-10-CM

## 2018-05-01 RX ORDER — ATORVASTATIN CALCIUM 40 MG/1
TABLET, FILM COATED ORAL
Qty: 90 TABLET | Refills: 2 | Status: SHIPPED | OUTPATIENT
Start: 2018-05-01 | End: 2019-01-30

## 2018-05-01 NOTE — TELEPHONE ENCOUNTER
"Last Written Prescription Date:  7/21/17  Last Fill Quantity: 90 tablet,  # refills: 1   Last office visit: 2/22/2018 with prescribing provider:  Arin   Future Office Visit:      Requested Prescriptions   Pending Prescriptions Disp Refills     atorvastatin (LIPITOR) 40 MG tablet [Pharmacy Med Name: ATORVASTATIN CALCIUM 40MG TABS] 90 tablet 1     Sig: TAKE ONE TABLET BY MOUTH EVERY DAY    Statins Protocol Passed    4/30/2018  7:54 PM       Passed - LDL on file in past 12 months    Recent Labs   Lab Test  02/22/18   1522   LDL  61            Passed - No abnormal creatine kinase in past 12 months    No lab results found.            Passed - Recent (12 mo) or future (30 days) visit within the authorizing provider's specialty    Patient had office visit in the last 12 months or has a visit in the next 30 days with authorizing provider or within the authorizing provider's specialty.  See \"Patient Info\" tab in inbasket, or \"Choose Columns\" in Meds & Orders section of the refill encounter.           Passed - Patient is age 18 or older          "

## 2018-05-01 NOTE — TELEPHONE ENCOUNTER
Prescription approved per [facility] to the [facility] Refill Protocol.  Noah Roa RN, BSN

## 2018-06-15 ENCOUNTER — OFFICE VISIT (OUTPATIENT)
Dept: FAMILY MEDICINE | Facility: CLINIC | Age: 63
End: 2018-06-15
Payer: COMMERCIAL

## 2018-06-15 VITALS
TEMPERATURE: 98.3 F | WEIGHT: 191 LBS | HEIGHT: 68 IN | BODY MASS INDEX: 28.95 KG/M2 | SYSTOLIC BLOOD PRESSURE: 140 MMHG | DIASTOLIC BLOOD PRESSURE: 72 MMHG | HEART RATE: 63 BPM

## 2018-06-15 DIAGNOSIS — D17.1 BENIGN LIPOMATOUS NEOPLASM OF SKIN AND SUBCUTANEOUS TISSUE OF TRUNK: ICD-10-CM

## 2018-06-15 DIAGNOSIS — I10 ESSENTIAL HYPERTENSION, BENIGN: ICD-10-CM

## 2018-06-15 DIAGNOSIS — Z01.818 PREOP GENERAL PHYSICAL EXAM: Primary | ICD-10-CM

## 2018-06-15 PROCEDURE — 99214 OFFICE O/P EST MOD 30 MIN: CPT | Performed by: FAMILY MEDICINE

## 2018-06-15 RX ORDER — RAMIPRIL 5 MG/1
5 CAPSULE ORAL DAILY
Qty: 90 CAPSULE | Refills: 1 | Status: SHIPPED | OUTPATIENT
Start: 2018-06-15 | End: 2019-05-01

## 2018-06-15 NOTE — PROGRESS NOTES
[facility]  [address] [id number] [phone]  Dept: [phone]    PRE-OP EVALUATION:  Today's date: 6/15/2018    Vladimir Morse (: 1955) presents for pre-operative evaluation assessment as requested by Dr. Ignacio Lee.  He requires evaluation and anesthesia risk assessment prior to undergoing surgery/procedure for treatment of Cataract surgery left eye 18, Right eye 18    Fax number for surgical facility: [phone]  Primary Physician: Reyes Hinkle  Type of Anesthesia Anticipated: to be determined    Patient has a Health Care Directive or Living Will:  NO    Preop Questions 2018   Who is doing your surgery?    What are you having done? [facility]    Date of Surgery/Procedure: 18   1.  Do you have a history of Heart attack, stroke, stent, coronary bypass surgery, or other heart surgery? No   2.  Do you ever have any pain or discomfort in your chest? No   3.  Do you have a history of  Heart Failure? No   4.   Are you troubled by shortness of breath when:  walking on a level surface, or up a slight hill, or at night? No   5.  Do you currently have a cold, bronchitis or other respiratory infection? No   6.  Do you have a cough, shortness of breath, or wheezing? No   7.  Do you sometimes get pains in the calves of your legs when you walk? No   8. Do you or anyone in your family have previous history of blood clots? No   9.  Do you or does anyone in your family have a serious bleeding problem such as prolonged bleeding following surgeries or cuts? No   10. Have you ever had problems with anemia or been told to take iron pills? No   11. Have you had any abnormal blood loss such as black, tarry or bloody stools? No   12. Have you ever had a blood transfusion? No   13. Have you or any of your relatives ever had problems with anesthesia? No   14. Do you have sleep apnea, excessive snoring or daytime drowsiness? No   15. Do you  have any prosthetic heart valves? No   16. Do you have prosthetic joints? No         HPI:     HPI related to upcoming procedure: symptomatic cataracts      MEDICAL HISTORY:     Patient Active Problem List    Diagnosis Date Noted     Essential hypertension, benign 02/22/2018     Priority: Medium     Gilbert's syndrome 12/13/2016     Priority: Medium     BPPV (benign paroxysmal positional vertigo), unspecified laterality 07/03/2015     Priority: Medium     Hyperlipidemia LDL goal <100 11/28/2014     Priority: Medium     9.2 % 10 yr risk 10/2015       Advanced directives, counseling/discussion 08/16/2012     Priority: Medium     Discussed advance care planning with patient; however, patient declined at this time. 8/16/2012          Nephrolithiasis 06/22/2012     Priority: Medium     Presbycusis 06/22/2012     Priority: Medium     Cerebral aneurysm 08/04/2011     Priority: Medium     Diverticulitis of colon 06/29/2006     Priority: Medium     Problem list name updated by automated process. Provider to review        Past Medical History:   Diagnosis Date     BPPV (benign paroxysmal positional vertigo), unspecified laterality 7/3/2015     Cerebral aneurysm 8/4/2011    atherosclerotic     DIVERTICULITIS OF COLON W/O BLEED 6/29/2006     Essential hypertension, benign 2/22/2018     Gilbert's syndrome 12/13/2016     Hyperlipidemia LDL goal <100 11/28/2014     Knee pain 2/6/2009     Lipoma of skin and subcutaneous tissue 6/22/2012     Nephrolithiasis 6/22/2012     Pain in joint, shoulder region 9/18/2010     Presbycusis 6/22/2012     SBO (small bowel obstruction) 7/27/2012     TIA (transient ischaemic attack)      Unspecified intestinal obstruction     diverticulitis     Past Surgical History:   Procedure Laterality Date     ARTHROSCOPY KNEE Right 10/26/2015    Procedure: ARTHROSCOPY KNEE;  Surgeon: Rodrigue Cole MD;  Location: [facility] OR     C NONSPECIFIC PROCEDURE      Perforated bowel; infant     COLONOSCOPY   11/23/2015    Dr. Freedman [facility]     CYSTOSCOPY, RETROGRADES, INSERT STENT URETER(S), COMBINED  6/29/2012    Procedure: COMBINED CYSTOSCOPY, RETROGRADES, INSERT STENT URETER(S);  LEFT URETEROSCOPY, LEFT URETERAL STENT PLACEMENT;  Surgeon: Timothy Ortega MD;  Location:  OR     EXTRACORPOREAL SHOCK WAVE LITHOTRIPSY (ESWL)  12/20/2012    Procedure: EXTRACORPOREAL SHOCK WAVE LITHOTRIPSY (ESWL);  LEFT EXTRACORPOREAL SHOCKWAVE LITHOTRIPSY ;  Surgeon: Timothy Ortega MD;  Location:  OR     ORTHOPEDIC SURGERY      knee cyst removed     TONSILLECTOMY      T&A as a child     Current Outpatient Prescriptions   Medication Sig Dispense Refill     aspirin 81 MG EC tablet Take 81 mg by mouth daily.         atorvastatin (LIPITOR) 40 MG tablet TAKE ONE TABLET BY MOUTH EVERY DAY 90 tablet 2     IBUPROFEN PO        ramipril (ALTACE) 2.5 MG capsule Take 1 capsule (2.5 mg) by mouth daily 90 capsule 1     OTC products: None, except as noted above    Allergies   Allergen Reactions     No Known Drug Allergies       Latex Allergy: NO    Social History   Substance Use Topics     Smoking status: Never Smoker     Smokeless tobacco: Never Used     Alcohol use Yes      Comment: rarely     History   Drug Use No       REVIEW OF SYSTEMS:   CONSTITUTIONAL: NEGATIVE for fever, chills, change in weight  ENT/MOUTH: NEGATIVE for ear, mouth and throat problems  RESP: NEGATIVE for significant cough or SOB  CV: NEGATIVE for chest pain, palpitations or peripheral edema    This document serves as a record of the services and decisions personally performed and made by Reyes Hinkle MD. It was created on his behalf by Jose Cruz Santiago, a trained medical scribe.  The creation of this document is based on the scribe's personal observations and the provider's statements to the medical scribe.  Jose Cruz Santiago, Hayley 15, 2018 4:31 PM    EXAM:   /72 (BP Location: Right arm, Patient Position: Chair, Cuff Size: Adult Regular)  Pulse 63  Temp 98.3  F (36.8  C)  "(Oral)  Ht 1.73 m (5' 8.1\")  Wt 86.6 kg (191 lb)  BMI 28.96 kg/m2    GENERAL APPEARANCE: healthy, alert and no distress     EYES: EOMI,  PERRL     HENT: ear canals and TM's normal and nose and mouth without ulcers or lesions     NECK: no adenopathy, no asymmetry, masses, or scars and thyroid normal to palpation     RESP: lungs clear to auscultation - no rales, rhonchi or wheezes     CV: regular rates and rhythm, normal S1 S2, no S3 or S4 and no murmur, click or rub     ABDOMEN:  soft, nontender, no HSM or masses and bowel sounds normal      DIAGNOSTICS:   No labs or EKG required for low risk surgery (cataract, skin procedure, breast biopsy, etc)    Recent Labs   Lab Test  02/22/18   1522  12/12/16   1129   09/03/13   1456   HGB  15.5  14.9   < >   --    PLT  273  294   < >   --    NA  141  141   < >   --    POTASSIUM  3.9  3.8   < >   --    CR  0.70  0.66   < >   --    A1C   --    --    --   5.3    < > = values in this interval not displayed.        IMPRESSION:   Reason for surgery/procedure: Cataract surgery left eye 6/27/18, Right eye 7/11/18  Diagnosis/reason for consult: assess perioperative risk      The proposed surgical procedure is considered LOW risk.    REVISED CARDIAC RISK INDEX  The patient has the following serious cardiovascular risks for perioperative complications such as (MI, PE, VFib and 3  AV Block):  No serious cardiac risks  INTERPRETATION: 1 risks: Class II (low risk - 0.9% complication rate)    The patient has the following additional risks for perioperative complications:      none    RECOMMENDATIONS:       APPROVAL GIVEN to proceed with proposed procedure, without further diagnostic evaluation     The information in this document, created by the medical scribe for me, accurately reflects the services I personally performed and the decisions made by me. I have reviewed and approved this document for accuracy prior to leaving the patient care area.  Reyes Hinkle MD Hayley 15, 2018 4:40 " PM    Copy of this evaluation report is provided to requesting physician.    [facility] Preop Guidelines    Revised Cardiac Risk Index  Answers for HPI/ROS submitted by the patient on 6/15/2018   Annual Exam:  Getting at least 3 servings of Calcium per day:: Yes  Bi-annual eye exam:: Yes  Dental care twice a year:: Yes  Sleep apnea or symptoms of sleep apnea:: None  Diet:: Regular (no restrictions)  Frequency of exercise:: 4-5 days/week  Taking medications regularly:: Yes  Medication side effects:: Not applicable, None  Additional concerns today:: No  PHQ-2 Score: 0  Duration of exercise:: 30-45 minutes

## 2018-06-15 NOTE — PROGRESS NOTES
"  Essential hypertension, benign   BP Readings from Last 1 Encounters:   06/15/18 140/72       Benign lipomatous neoplasm of skin and subcutaneous tissue of trunk he wonders about treatment  /72 (BP Location: Right arm, Patient Position: Chair, Cuff Size: Adult Regular)  Pulse 63  Temp 98.3  F (36.8  C) (Oral)  Ht 5' 8.1\" (1.73 m)  Wt 191 lb (86.6 kg)  BMI 28.96 kg/m2  Palm sized soft mobile l;ipoma left lower back    ASSESSMENT / PLAN:  *    (I10) Essential hypertension, benign  Comment: borderline control  Plan: ramipril (ALTACE) 5 MG capsule        Increase dose    (D17.1) Benign lipomatous neoplasm of skin and subcutaneous tissue of trunk  Comment: discussed options  Plan: he will consider surgery. I would refer to gen Surg due to size        Reyes Hinkle MD            "

## 2018-06-15 NOTE — MR AVS SNAPSHOT
After Visit Summary   6/15/2018    Vladimir Morse    MRN: [id number]           Patient Information     Date Of Birth          1955        Visit Information        Provider Department      6/15/2018 4:15 PM Reyes Hinkle MD [facility]        Today's Diagnoses     Preop general physical exam    -  1    Essential hypertension, benign        Benign lipomatous neoplasm of skin and subcutaneous tissue of trunk          Care Instructions      Before Your Surgery      Call your surgeon if there is any change in your health. This includes signs of a cold or flu (such as a sore throat, runny nose, cough, rash or fever).    Do not smoke, drink alcohol or take over the counter medicine (unless your surgeon or primary care doctor tells you to) for the 24 hours before and after surgery.    If you take prescribed drugs: Follow your doctor s orders about which medicines to take and which to stop until after surgery.    Eating and drinking prior to surgery: follow the instructions from your surgeon    Take a shower or bath the night before surgery. Use the soap your surgeon gave you to gently clean your skin. If you do not have soap from your surgeon, use your regular soap. Do not shave or scrub the surgery site.  Wear clean pajamas and have clean sheets on your bed.           Follow-ups after your visit        Who to contact     If you have questions or need follow up information about today's clinic visit or your schedule please contact [facility] directly at [phone].  Normal or non-critical lab and imaging results will be communicated to you by MyChart, letter or phone within 4 business days after the clinic has received the results. If you do not hear from us within 7 days, please contact the clinic through MyChart or phone. If you have a critical or abnormal lab result, we will notify you by phone as soon as possible.  Submit refill requests through [employer] or call  "your pharmacy and they will forward the refill request to us. Please allow 3 business days for your refill to be completed.          Additional Information About Your Visit        Care EveryWhere ID     This is your Care EveryWhere ID. This could be used by other organizations to access your [place] medical records  [id number]        Your Vitals Were     Pulse Temperature Height BMI (Body Mass Index)          63 98.3  F (36.8  C) (Oral) 5' 8.1\" (1.73 m) 28.96 kg/m2         Blood Pressure from Last 3 Encounters:   06/15/18 140/72   04/02/18 138/82   02/22/18 (!) 160/93    Weight from Last 3 Encounters:   06/15/18 191 lb (86.6 kg)   02/22/18 202 lb (91.6 kg)   07/21/17 200 lb 6.4 oz (90.9 kg)              Today, you had the following     No orders found for display         Today's Medication Changes          These changes are accurate as of 6/15/18  6:47 PM.  If you have any questions, ask your nurse or doctor.               These medicines have changed or have updated prescriptions.        Dose/Directions    ramipril 5 MG capsule   Commonly known as:  ALTACE   This may have changed:    - medication strength  - how much to take   Used for:  Essential hypertension, benign   Changed by:  Reyes Hinkle MD        Dose:  5 mg   Take 1 capsule (5 mg) by mouth daily   Quantity:  90 capsule   Refills:  1            Where to get your medicines      These medications were sent to [facility] - [address] [address]     Phone:  [phone]     ramipril 5 MG capsule                Primary Care Provider Office Phone # Fax #    Reyes Hinkle [phone] [address] [facility] [address]        Equal Access to Services     YASMANY [place] : Clara chapman Solesa, waaxda luqadaha, qaybta kaalmada marieyada, josselyn vazquez. So [facility] [phone].    ATENCIÓN: Si habla español, tiene a wright disposición servicios " [place] de asistencia lingüística. Triny andrade [phone].    We comply with applicable federal civil rights laws and [place] laws. We do not discriminate on the basis of race, color, national origin, age, disability, sex, sexual orientation, or gender identity.            Thank you!     Thank you for choosing [facility]  for your care. Our goal is always to provide you with excellent care. Hearing back from our patients is one way we can continue to improve our services. Please take a few minutes to complete the written survey that you may receive in the mail after your visit with us. Thank you!             Your Updated Medication List - Protect others around you: Learn how to safely use, store and throw away your medicines at www.disposemymeds.org.          This list is accurate as of 6/15/18  6:47 PM.  Always use your most recent med list.                   Brand Name Dispense Instructions for use Diagnosis    aspirin 81 MG EC tablet      Take 81 mg by mouth daily.        atorvastatin 40 MG tablet    LIPITOR    90 tablet    TAKE ONE TABLET BY MOUTH EVERY DAY    Hyperlipidemia LDL goal <100       IBUPROFEN PO           ramipril 5 MG capsule    ALTACE    90 capsule    Take 1 capsule (5 mg) by mouth daily    Essential hypertension, benign

## 2018-07-20 ENCOUNTER — RADIANT APPOINTMENT (OUTPATIENT)
Dept: GENERAL RADIOLOGY | Facility: CLINIC | Age: 63
End: 2018-07-20
Attending: FAMILY MEDICINE
Payer: COMMERCIAL

## 2018-07-20 ENCOUNTER — OFFICE VISIT (OUTPATIENT)
Dept: FAMILY MEDICINE | Facility: CLINIC | Age: 63
End: 2018-07-20
Payer: COMMERCIAL

## 2018-07-20 VITALS
RESPIRATION RATE: 16 BRPM | TEMPERATURE: 98.1 F | BODY MASS INDEX: 29.1 KG/M2 | DIASTOLIC BLOOD PRESSURE: 78 MMHG | WEIGHT: 192 LBS | HEIGHT: 68 IN | HEART RATE: 72 BPM | SYSTOLIC BLOOD PRESSURE: 128 MMHG

## 2018-07-20 DIAGNOSIS — S90.221A SUBUNGUAL HEMATOMA OF RIGHT FOOT, INITIAL ENCOUNTER: ICD-10-CM

## 2018-07-20 DIAGNOSIS — D17.30 LIPOMA OF SKIN AND SUBCUTANEOUS TISSUE: ICD-10-CM

## 2018-07-20 DIAGNOSIS — S90.221A SUBUNGUAL HEMATOMA OF RIGHT FOOT, INITIAL ENCOUNTER: Primary | ICD-10-CM

## 2018-07-20 DIAGNOSIS — R03.0 ELEVATED BLOOD PRESSURE READING WITHOUT DIAGNOSIS OF HYPERTENSION: ICD-10-CM

## 2018-07-20 PROCEDURE — 73630 X-RAY EXAM OF FOOT: CPT | Mod: RT

## 2018-07-20 PROCEDURE — 99214 OFFICE O/P EST MOD 30 MIN: CPT | Performed by: FAMILY MEDICINE

## 2018-07-20 NOTE — MR AVS SNAPSHOT
After Visit Summary   7/20/2018    Vladimir Morse    MRN: [id number]           Patient Information     Date Of Birth          1955        Visit Information        Provider Department      7/20/2018 4:00 PM Reyes Hinkle MD [facility]        Today's Diagnoses     Subungual hematoma of right foot, initial encounter    -  1    Lipoma of skin and subcutaneous tissue        Elevated blood pressure reading without diagnosis of hypertension           Follow-ups after your visit        Additional Services     GENERAL SURG ADULT REFERRAL       Your provider has referred you to: [facility]: [facility] [place] [phone]   [email or web address]    Please be aware that coverage of these services is subject to the terms and limitations of your health insurance plan.  Call member services at your health plan with any benefit or coverage questions.      Please bring the following with you to your appointment:    (1) Any X-Rays, CTs or MRIs which have been performed.  Contact the facility where they were done to arrange for  prior to your scheduled appointment.   (2) List of current medications   (3) This referral request   (4) Any documents/labs given to you for this referral                  Follow-up notes from your care team     Return in about 6 months (around 1/20/2019).      Who to contact     If you have questions or need follow up information about today's clinic visit or your schedule please contact [facility] directly at [phone].  Normal or non-critical lab and imaging results will be communicated to you by MyChart, letter or phone within 4 business days after the clinic has received the results. If you do not hear from us within 7 days, please contact the clinic through MyChart or phone. If you have a critical or abnormal lab result, we will notify you by phone as soon as possible.  Submit refill  "requests through Scaffold or call your pharmacy and they will forward the refill request to us. Please allow 3 business days for your refill to be completed.          Additional Information About Your Visit        Care EveryWhere ID     This is your Care EveryWhere ID. This could be used by other organizations to access your [facility] medical records  [id number]        Your Vitals Were     Pulse Temperature Respirations Height BMI (Body Mass Index)       72 98.1  F (36.7  C) (Oral) 16 5' 8\" (1.727 m) 29.19 kg/m2        Blood Pressure from Last 3 Encounters:   07/20/18 128/78   06/15/18 140/72   04/02/18 138/82    Weight from Last 3 Encounters:   07/20/18 192 lb (87.1 kg)   06/15/18 191 lb (86.6 kg)   02/22/18 202 lb (91.6 kg)              We Performed the Following     GENERAL SURG ADULT REFERRAL        Primary Care Provider Office Phone # Fax #    Reyes Hinkle [phone] [address] [facility] [address]        Equal Access to Services     [facility]: Hadii aad ku hadasho Solesa, waaxda luqadaha, qaybta kaalmada adedaveyademi, josselyn oseguera . So [facility] [phone].    ATENCIÓN: Si habla español, tiene a wright disposición servicios gratuitos de asistencia lingüística. [facility] [phone].    We comply with applicable federal civil rights laws and [place] laws. We do not discriminate on the basis of race, color, national origin, age, disability, sex, sexual orientation, or gender identity.            Thank you!     Thank you for choosing [facility]  for your care. Our goal is always to provide you with excellent care. Hearing back from our patients is one way we can continue to improve our services. Please take a few minutes to complete the written survey that you may receive in the mail after your visit with us. Thank you!             Your Updated Medication List - Protect others around you: Learn how to safely use, store and throw away " your medicines at www.disposemymeds.org.          This list is accurate as of 7/20/18 11:59 PM.  Always use your most recent med list.                   Brand Name Dispense Instructions for use Diagnosis    aspirin 81 MG EC tablet      Take 81 mg by mouth daily.        atorvastatin 40 MG tablet    LIPITOR    90 tablet    TAKE ONE TABLET BY MOUTH EVERY DAY    Hyperlipidemia LDL goal <100       IBUPROFEN PO           ramipril 5 MG capsule    ALTACE    90 capsule    Take 1 capsule (5 mg) by mouth daily    Essential hypertension, benign

## 2018-07-20 NOTE — PROGRESS NOTES
SUBJECTIVE:   Vladimir Morse is a 62 year old male who presents to clinic today for the following health issues:      Right great toe pain and swelling   Evaluate skin lesion on back        Problem list and histories reviewed & adjusted, as indicated.  Additional history:         Reviewed and updated as needed this visit by clinical staff  Tobacco  Allergies       Reviewed and updated as needed this visit by Provider         Reyes Hinkle

## 2018-07-20 NOTE — PROGRESS NOTES
"  SUBJECTIVE:   Vladimir Morse is a 62 year old male who presents to clinic today for the following health issues:    Subungual hematoma of right foot, initial encounter  (primary encounter diagnosis) - 3-5 weeks ago Vladimir noticed that his right great toe nail had turned black . He does not remember injuring his toe in any way. Painful last few days: wonders about infection    Lipoma of skin and subcutaneous tissue - Has a large painless \"cyst\" on his lower back for decades and would like to have it removed.       Problem list and histories reviewed & adjusted, as indicated.  Additional history: as documented    Past Medical History:   Diagnosis Date     BPPV (benign paroxysmal positional vertigo), unspecified laterality 7/3/2015     Cerebral aneurysm 8/4/2011    atherosclerotic     DIVERTICULITIS OF COLON W/O BLEED 6/29/2006     Essential hypertension, benign 2/22/2018     Gilbert's syndrome 12/13/2016     Hyperlipidemia LDL goal <100 11/28/2014     Knee pain 2/6/2009     Lipoma of skin and subcutaneous tissue 6/22/2012     Nephrolithiasis 6/22/2012     Pain in joint, shoulder region 9/18/2010     Presbycusis 6/22/2012     SBO (small bowel obstruction) 7/27/2012     TIA (transient ischaemic attack)      Unspecified intestinal obstruction     diverticulitis       Past Surgical History:   Procedure Laterality Date     ARTHROSCOPY KNEE Right 10/26/2015    Procedure: ARTHROSCOPY KNEE;  Surgeon: Rodrigue Cole MD;  Location:  OR     AS TOTAL KNEE ARTHROPLASTY Right 03/05/2018     C NONSPECIFIC PROCEDURE      Perforated bowel; infant     COLONOSCOPY  11/23/2015    Dr. Freedman [facility]     CYSTOSCOPY, RETROGRADES, INSERT STENT URETER(S), COMBINED  6/29/2012    Procedure: COMBINED CYSTOSCOPY, RETROGRADES, INSERT STENT URETER(S);  LEFT URETEROSCOPY, LEFT URETERAL STENT PLACEMENT;  Surgeon: Timothy Ortega MD;  Location:  OR     EXTRACORPOREAL SHOCK WAVE LITHOTRIPSY (ESWL)  12/20/2012    Procedure: EXTRACORPOREAL " "SHOCK WAVE LITHOTRIPSY (ESWL);  LEFT EXTRACORPOREAL SHOCKWAVE LITHOTRIPSY ;  Surgeon: Timothy Ortega MD;  Location: [facility] OR     ORTHOPEDIC SURGERY      knee cyst removed     TONSILLECTOMY      T&A as a child       Family History   Problem Relation Age of Onset     Breast Cancer Mother      Cancer - colorectal Father      Breast Cancer Sister      Colon Cancer No family hx of        Social History   Substance Use Topics     Smoking status: Never Smoker     Smokeless tobacco: Never Used     Alcohol use No      Comment: rarely     Reviewed and updated as needed this visit by clinical staff  Allergies       Reviewed and updated as needed this visit by Provider         ROS:  No fever rash    This document serves as a record of the services and decisions personally performed and made by Reyes Hinkle MD. It was created on his behalf by Jose Cruz Santiago, a trained medical scribe.  The creation of this document is based on the scribe's personal observations and the provider's statements to the medical scribe.  Jose Cruz Santiago, July 20, 2018 4:05 PM    OBJECTIVE:     /78 (BP Location: Left arm, Patient Position: Chair, Cuff Size: Adult Large)  Pulse 74  Temp 98.1  F (36.7  C) (Oral)  Resp 16  Ht 1.727 m (5' 8\")  Wt 87.1 kg (192 lb)  BMI 29.19 kg/m2  Body mass index is 29.19 kg/(m^2).      SKIN: Large  Hand sized soft mobile mass c/w lipoma noted on lower left  back.  Hallux major nail right with subungual hematoma no paronychium no skin involvement movement painless    Xray - No Fracture s    ASSESSMENT/PLAN:   ASSESSMENT / PLAN:  (S90.221A) Subungual hematoma of right foot, initial encounter  (primary encounter diagnosis)  Comment: Postulate new nail growing underneath. Discussed proper care. No infection noted. Joy history discussed   Plan: XR Foot Right G/E 3 Views            (D17.30) Lipoma of skin and subcutaneous tissue  Comment: Referred patient to [facility] to consider elective excision of  " lipoma.   Plan: GENERAL SURG ADULT REFERRAL      (R03.0) Elevated blood pressure reading without diagnosis of hypertension  Comment: repeat lower  BP Readings from Last 6 Encounters:   07/20/18 128/78   06/15/18 140/72   04/02/18 138/82   02/22/18 (!) 160/93   07/21/17 138/83   12/12/16 134/81   Appears to have white coat component    Plan: monitor          Reyes Hinkle MD              Vaccinations: Receive Flu shot this up coming fall.     The information in this document, created by the medical scribe for me, accurately reflects the services I personally performed and the decisions made by me. I have reviewed and approved this document for accuracy prior to leaving the patient care area.  Reyes Hinkle MD July 20, 2018 4:05 PM

## 2018-07-22 PROBLEM — R03.0 ELEVATED BLOOD PRESSURE READING WITHOUT DIAGNOSIS OF HYPERTENSION: Status: ACTIVE | Noted: 2018-07-22

## 2018-07-22 PROBLEM — S90.221A: Status: ACTIVE | Noted: 2018-07-22

## 2018-07-22 PROBLEM — D17.30 LIPOMA OF SKIN AND SUBCUTANEOUS TISSUE: Status: ACTIVE | Noted: 2018-07-22

## 2018-07-23 ENCOUNTER — TELEPHONE (OUTPATIENT)
Dept: SURGERY | Facility: CLINIC | Age: 63
End: 2018-07-23

## 2018-07-23 NOTE — TELEPHONE ENCOUNTER
Referral received from  SAPNA RIVERA for lipoma on back.    Attempt #1:    Called patient at [phone].   patient to return call to clinic at [phone] when ready to schedule. Rita

## 2018-07-30 ENCOUNTER — OFFICE VISIT (OUTPATIENT)
Dept: SURGERY | Facility: CLINIC | Age: 63
End: 2018-07-30
Payer: COMMERCIAL

## 2018-07-30 ENCOUNTER — TELEPHONE (OUTPATIENT)
Dept: SURGERY | Facility: CLINIC | Age: 63
End: 2018-07-30

## 2018-07-30 VITALS
BODY MASS INDEX: 28.79 KG/M2 | DIASTOLIC BLOOD PRESSURE: 70 MMHG | RESPIRATION RATE: 16 BRPM | OXYGEN SATURATION: 97 % | WEIGHT: 190 LBS | SYSTOLIC BLOOD PRESSURE: 112 MMHG | HEART RATE: 58 BPM | HEIGHT: 68 IN

## 2018-07-30 DIAGNOSIS — D23.5 BENIGN NEOPLASM OF SKIN OF TRUNK, EXCEPT SCROTUM: ICD-10-CM

## 2018-07-30 DIAGNOSIS — R22.9 LOCALIZED SUPERFICIAL SWELLING, MASS, OR LUMP: Primary | ICD-10-CM

## 2018-07-30 PROCEDURE — 99202 OFFICE O/P NEW SF 15 MIN: CPT | Performed by: SURGERY

## 2018-07-30 NOTE — MR AVS SNAPSHOT
"              After Visit Summary   7/30/2018    Vladimir Morse    MRN: [id number]           Patient Information     Date Of Birth          1955        Visit Information        Provider Department      7/30/2018 3:30 PM Prosper Loving MD [facility] General Surgery      Today's Diagnoses     Localized superficial swelling, mass, or lump    -  1    Benign neoplasm of skin of trunk, except scrotum           Follow-ups after your visit        Your next 10 appointments already scheduled     Jul 30, 2018  3:30 PM CDT   CONSULT with Prosper Loving MD   [facility] ([facility])    [address] [facility] [address]  [facility] [address] [phone]              Who to contact     If you have questions or need follow up information about today's clinic visit or your schedule please contact [facility] directly at [phone].  Normal or non-critical lab and imaging results will be communicated to you by MyChart, letter or phone within 4 business days after the clinic has received the results. If you do not hear from us within 7 days, please contact the clinic through MyChart or phone. If you have a critical or abnormal lab result, we will notify you by phone as soon as possible.  Submit refill requests through Dianxin or call your pharmacy and they will forward the refill request to us. Please allow 3 business days for your refill to be completed.          Additional Information About Your Visit        Care EveryWhere ID     This is your Care EveryWhere ID. This could be used by other organizations to access your [facility] [place] medical records  [id number]        Your Vitals Were     Pulse Respirations Height Pulse Oximetry BMI (Body Mass Index)       58 16 5' 8\" (1.727 m) 97% 28.89 kg/m2        Blood Pressure from Last 3 Encounters:   07/30/18 112/70   07/20/18 128/78   06/15/18 140/72    Weight from Last 3 " Encounters:   07/30/18 190 lb (86.2 kg)   07/20/18 192 lb (87.1 kg)   06/15/18 191 lb (86.6 kg)              Today, you had the following     No orders found for display       Primary Care Provider Office Phone # Fax #    Reyes Hinkle [phone] [address] [facility] [address]        Equal Access to Services     [facility] : Hadii aad ku hadasho Soomaali, waaxda luqadaha, qaybta kaalmada adeegyada, waxay idiin hayaan adeeg niya laspencern . So [facility] [phone].    ATENCIÓN: Si habla español, tiene a wright disposición servicios gratuitos de asistencia lingüística. Llame al [phone].    We comply with applicable federal civil rights laws and [place] laws. We do not discriminate on the basis of race, color, national origin, age, disability, sex, sexual orientation, or gender identity.            Thank you!     Thank you for choosing [facility]  for your care. Our goal is always to provide you with excellent care. Hearing back from our patients is one way we can continue to improve our services. Please take a few minutes to complete the written survey that you may receive in the mail after your visit with us. Thank you!             Your Updated Medication List - Protect others around you: Learn how to safely use, store and throw away your medicines at www.disposemymeds.org.          This list is accurate as of 7/30/18  2:54 PM.  Always use your most recent med list.                   Brand Name Dispense Instructions for use Diagnosis    aspirin 81 MG EC tablet      Take 81 mg by mouth daily.        atorvastatin 40 MG tablet    LIPITOR    90 tablet    TAKE ONE TABLET BY MOUTH EVERY DAY    Hyperlipidemia LDL goal <100       IBUPROFEN PO           ramipril 5 MG capsule    ALTACE    90 capsule    Take 1 capsule (5 mg) by mouth daily    Essential hypertension, benign

## 2018-07-30 NOTE — TELEPHONE ENCOUNTER
Type of surgery: EXCISION SUBCUTANEOUS MASS AND SKIN LESION RIGHT BACK   Location of surgery: [facility]  Date and time of surgery: 9-21-18 AT 7:30 AM   Surgeon: DR. SANTOS   Pre-Op Appt Date: PATIENT TO SCHEDULE   Post-Op Appt Date: PATIENT TO SCHEDULE    Packet sent out: GIVEN TO PATIENT   Pre-cert/Authorization completed:  Not Applicable  Date: 7-30-18       EXCISION SUBCUTANEOUS MASS AND SKIN LESION RIGHT BACK    GENERAL   PT INST TO HAVE H&P WITH DR. RIVERA   90 MINS REQ  PA ASSIST KODY TERRELL

## 2018-07-30 NOTE — PROGRESS NOTES
"HPI:  Vladimir presents today for a lump on his lower back for \" decades\". He denies trauma at to the site.  He has had  no drainage from the site in the past.  He has no history of  infection.  It is intermittently and increasingly painful.  It's size is slowly increasing.    PE:  There were no vitals taken for this visit.  General appearance: well-nourished, no apparent distress  Lungs: respirations unlabored  Neurologic: nonfocal, grossly intact times four extremities, alert and oriented times three  Psychiatric: Mood and affect are appropriate  Skin: There is a 9 cm subcutaneous mass on the lower back.  The overlying skin is  normal with the exception of a raised pigmented skin lesion that seems most likely to be a pigmented seborrheic keratosis.  It is mildly tender.      Impression: enlarging and increasingly symptomatic subcutaneous mass, pigmented skin lesion         Plan:  We will schedule excision of both the mass and the skin lesion at his convenience. Discussed incision, scar, recurrence, bleeding, seroma and recovery.     Prosper Loving MD    Please route or send letter to:  Primary Care Provider (PCP) and Include Progress Note          "

## 2018-09-10 ENCOUNTER — OFFICE VISIT (OUTPATIENT)
Dept: FAMILY MEDICINE | Facility: CLINIC | Age: 63
End: 2018-09-10
Payer: COMMERCIAL

## 2018-09-10 VITALS
DIASTOLIC BLOOD PRESSURE: 81 MMHG | WEIGHT: 187 LBS | HEIGHT: 68 IN | HEART RATE: 56 BPM | RESPIRATION RATE: 12 BRPM | TEMPERATURE: 97.4 F | BODY MASS INDEX: 28.34 KG/M2 | SYSTOLIC BLOOD PRESSURE: 130 MMHG

## 2018-09-10 DIAGNOSIS — Z01.818 PREOP GENERAL PHYSICAL EXAM: Primary | ICD-10-CM

## 2018-09-10 PROCEDURE — 99214 OFFICE O/P EST MOD 30 MIN: CPT | Performed by: FAMILY MEDICINE

## 2018-09-10 NOTE — PROGRESS NOTES
[facility]  [address] [facility] [address]  [phone]  Dept: [phone]    PRE-OP EVALUATION:  Today's date: 9/10/2018    Vladimir Morse (: 1955) presents for pre-operative evaluation assessment as requested by Dr. Loving.  He requires evaluation and anesthesia risk assessment prior to undergoing surgery/procedure for treatment of sebaceous cystectomy .    Fax number for surgical facility: [facility]  Primary Physician: Reyes Hinkle  Type of Anesthesia Anticipated: to be determined    Patient has a Health Care Directive or Living Will:  NO    Preop Questions 9/10/2018   Who is doing your surgery? dr sheryl loving   What are you having done? cystectomy   Date of Surgery/Procedure: 18   Facility or Hospital where procedure/surgery will be performed: [facility]   1.  Do you have a history of Heart attack, stroke, stent, coronary bypass surgery, or other heart surgery? No   2.  Do you ever have any pain or discomfort in your chest? No   3.  Do you have a history of  Heart Failure? No   4.   Are you troubled by shortness of breath when:  walking on a level surface, or up a slight hill, or at night? No   5.  Do you currently have a cold, bronchitis or other respiratory infection? No   6.  Do you have a cough, shortness of breath, or wheezing? No   7.  Do you sometimes get pains in the calves of your legs when you walk? No   8. Do you or anyone in your family have previous history of blood clots? No   9.  Do you or does anyone in your family have a serious bleeding problem such as prolonged bleeding following surgeries or cuts? No   10. Have you ever had problems with anemia or been told to take iron pills? No   11. Have you had any abnormal blood loss such as black, tarry or bloody stools? No   12. Have you ever had a blood transfusion? No   13. Have you or any of your relatives ever had problems with anesthesia? No   14. Do you have sleep  apnea, excessive snoring or daytime drowsiness? No   15. Do you have any prosthetic heart valves? No   16. Do you have prosthetic joints? No     HPI:     HPI related to upcoming procedure: Excision subcutaneous mass and skin lesions right back    MEDICAL HISTORY:     Patient Active Problem List    Diagnosis Date Noted     Subungual hematoma of right foot, initial encounter 07/22/2018     Priority: Medium     Lipoma of skin and subcutaneous tissue 07/22/2018     Priority: Medium     Elevated blood pressure reading without diagnosis of hypertension 07/22/2018     Priority: Medium     Essential hypertension, benign 02/22/2018     Priority: Medium     Gilbert's syndrome 12/13/2016     Priority: Medium     BPPV (benign paroxysmal positional vertigo), unspecified laterality 07/03/2015     Priority: Medium     Hyperlipidemia LDL goal <100 11/28/2014     Priority: Medium     9.2 % 10 yr risk 10/2015       Advanced directives, counseling/discussion 08/16/2012     Priority: Medium     Discussed advance care planning with patient; however, patient declined at this time. 8/16/2012          Nephrolithiasis 06/22/2012     Priority: Medium     Presbycusis 06/22/2012     Priority: Medium     Cerebral aneurysm 08/04/2011     Priority: Medium     Diverticulitis of colon 06/29/2006     Priority: Medium     Problem list name updated by automated process. Provider to review        Past Medical History:   Diagnosis Date     BPPV (benign paroxysmal positional vertigo), unspecified laterality 7/3/2015     Cerebral aneurysm 8/4/2011    atherosclerotic     DIVERTICULITIS OF COLON W/O BLEED 6/29/2006     Elevated blood pressure reading without diagnosis of hypertension 7/22/2018     Essential hypertension, benign 2/22/2018     Gilbert's syndrome 12/13/2016     Hyperlipidemia LDL goal <100 11/28/2014     Knee pain 2/6/2009     Lipoma of skin and subcutaneous tissue 6/22/2012     Nephrolithiasis 6/22/2012     Pain in joint, shoulder region  9/18/2010     Presbycusis 6/22/2012     SBO (small bowel obstruction) 7/27/2012     Subungual hematoma of right foot, initial encounter 7/22/2018     TIA (transient ischaemic attack)      Unspecified intestinal obstruction     diverticulitis     Past Surgical History:   Procedure Laterality Date     ARTHROSCOPY KNEE Right 10/26/2015    Procedure: ARTHROSCOPY KNEE;  Surgeon: Rodrigue Cole MD;  Location:  OR     AS TOTAL KNEE ARTHROPLASTY Right 03/05/2018     C NONSPECIFIC PROCEDURE      Perforated bowel; infant     COLONOSCOPY  11/23/2015    Dr. Freedman [facility]     CYSTOSCOPY, RETROGRADES, INSERT STENT URETER(S), COMBINED  6/29/2012    Procedure: COMBINED CYSTOSCOPY, RETROGRADES, INSERT STENT URETER(S);  LEFT URETEROSCOPY, LEFT URETERAL STENT PLACEMENT;  Surgeon: Timohty Ortega MD;  Location:  OR     EXTRACORPOREAL SHOCK WAVE LITHOTRIPSY (ESWL)  12/20/2012    Procedure: EXTRACORPOREAL SHOCK WAVE LITHOTRIPSY (ESWL);  LEFT EXTRACORPOREAL SHOCKWAVE LITHOTRIPSY ;  Surgeon: Timothy Ortega MD;  Location:  OR     ORTHOPEDIC SURGERY      knee cyst removed     TONSILLECTOMY      T&A as a child     Current Outpatient Prescriptions   Medication Sig Dispense Refill     aspirin 81 MG EC tablet Take 81 mg by mouth daily.         atorvastatin (LIPITOR) 40 MG tablet TAKE ONE TABLET BY MOUTH EVERY DAY 90 tablet 2     IBUPROFEN PO        ramipril (ALTACE) 5 MG capsule Take 1 capsule (5 mg) by mouth daily 90 capsule 1     OTC products: None, except as noted above    Allergies   Allergen Reactions     No Known Drug Allergies       Latex Allergy: NO    Social History   Substance Use Topics     Smoking status: Never Smoker     Smokeless tobacco: Never Used     Alcohol use No      Comment: rarely     History   Drug Use No       REVIEW OF SYSTEMS:   CONSTITUTIONAL: NEGATIVE for fever, chills, change in weight  INTEGUMENTARY/SKIN: Per general surgery  EYES: NEGATIVE for vision changes or irritation  ENT/MOUTH: NEGATIVE for  "ear, mouth and throat problems  RESP: NEGATIVE for significant cough or SOB  CV: NEGATIVE for chest pain, palpitations or peripheral edema  GI: NEGATIVE for nausea, abdominal pain, heartburn, or change in bowel habits  : NEGATIVE for frequency, dysuria, or hematuria  PSYCHIATRIC: NEGATIVE for changes in mood or affect    This document serves as a record of the services and decisions personally performed and made by Reyes Hinkle MD. It was created on his behalf by Jose Cruz Santiago, a trained medical scribe.  The creation of this document is based on the scribe's personal observations and the provider's statements to the medical scribe.  Jose Cruz Santiago, September 10, 2018 4:43 PM  EXAM:   /81 (BP Location: Right arm, Patient Position: Chair, Cuff Size: Adult Regular)  Pulse 56  Temp 97.4  F (36.3  C) (Oral)  Resp 12  Ht 5' 8\" (1.727 m)  Wt 187 lb (84.8 kg)  BMI 28.43 kg/m2    GENERAL APPEARANCE: healthy, alert and no distress     HENT: ear canals and TM's normal and nose and mouth without ulcers or lesions     NECK: no adenopathy, no asymmetry, masses, or scars and thyroid normal to palpation     RESP: lungs clear to auscultation - no rales, rhonchi or wheezes     CV: regular rates and rhythm, normal S1 S2, no S3 or S4 and no murmur, click or rub     ABDOMEN:  soft, nontender, no HSM or masses and bowel sounds normal        SKIN: PER general surgery     NEURO:  mentation intact and speech normal     PSYCH: mentation appears normal. and affect normal/bright     LYMPHATICS: No cervical adenopathy    DIAGNOSTICS:   No labs or EKG required for low risk surgery (cataract, skin procedure, breast biopsy, etc)    Recent Labs   Lab Test  02/22/18   1522  12/12/16   1129   09/03/13   1456   HGB  15.5  14.9   < >   --    PLT  273  294   < >   --    NA  141  141   < >   --    POTASSIUM  3.9  3.8   < >   --    CR  0.70  0.66   < >   --    A1C   --    --    --   5.3    < > = values in this interval not displayed. "     IMPRESSION:   Reason for surgery/procedure:  Excision subcutaneous mass and skin lesions right back  Diagnosis/reason for consult: assess perioperative risk    The proposed surgical procedure is considered LOW risk.    REVISED CARDIAC RISK INDEX  The patient has the following serious cardiovascular risks for perioperative complications such as (MI, PE, VFib and 3  AV Block):  No serious cardiac risks  INTERPRETATION: 0 risks: Class I (very low risk - 0.4% complication rate)    The patient has the following additional risks for perioperative complications:  No identified additional risks      ICD-10-CM    1. Preop general physical exam Z01.818      RECOMMENDATIONS:     Hold Asprin 5 days before surgery.  Hold all medications morning of surgery.     APPROVAL GIVEN to proceed with proposed procedure, without further diagnostic evaluation     The information in this document, created by the medical scribe for me, accurately reflects the services I personally performed and the decisions made by me. I have reviewed and approved this document for accuracy prior to leaving the patient care area.  Reyes Hinkle MD September 10, 2018 4:45 PM    Signed Electronically by: Reyes Hinkle MD    Copy of this evaluation report is provided to requesting physician.    [facility] Preop Guidelines    Revised Cardiac Risk Index

## 2018-09-10 NOTE — PATIENT INSTRUCTIONS
Before Your Surgery      Call your surgeon if there is any change in your health. This includes signs of a cold or flu (such as a sore throat, runny nose, cough, rash or fever).    Do not smoke, drink alcohol or take over the counter medicine (unless your surgeon or primary care doctor tells you to) for the 24 hours before and after surgery.    If you take prescribed drugs: Follow your doctor s orders about which medicines to take and which to stop until after surgery.    Eating and drinking prior to surgery: follow the instructions from your surgeon    Take a shower or bath the night before surgery. Use the soap your surgeon gave you to gently clean your skin. If you do not have soap from your surgeon, use your regular soap. Do not shave or scrub the surgery site.  Wear clean pajamas and have clean sheets on your bed.     New Shingles Vaccination @ pharmacy    Flu shot [facility]

## 2018-09-10 NOTE — MR AVS SNAPSHOT
After Visit Summary   9/10/2018    Vladimir Morse    MRN: [id number]           Patient Information     Date Of Birth          1955        Visit Information        Provider Department      9/10/2018 4:00 PM Reyes Hinkle MD [facility]        Today's Diagnoses     Preop general physical exam    -  1      Care Instructions      Before Your Surgery      Call your surgeon if there is any change in your health. This includes signs of a cold or flu (such as a sore throat, runny nose, cough, rash or fever).    Do not smoke, drink alcohol or take over the counter medicine (unless your surgeon or primary care doctor tells you to) for the 24 hours before and after surgery.    If you take prescribed drugs: Follow your doctor s orders about which medicines to take and which to stop until after surgery.    Eating and drinking prior to surgery: follow the instructions from your surgeon    Take a shower or bath the night before surgery. Use the soap your surgeon gave you to gently clean your skin. If you do not have soap from your surgeon, use your regular soap. Do not shave or scrub the surgery site.  Wear clean pajamas and have clean sheets on your bed.     New Shingles Vaccination @ pharmacy    Flu shot [facility]          Follow-ups after your visit        Follow-up notes from your care team     Return in about 5 months (around 2/10/2019).      Your next 10 appointments already scheduled     Sep 21, 2018  7:30 AM CDT   [facility] Same Day Surgery with Prosper Loving MD, Zuleika Xie PA-C   [facility] ([facility])    [facility] ([facility])   [phone]            Sep 21, 2018   Procedure with Prosper Loving MD   [facility] PeriOp Services (--)    [address] [facility] [id number] [phone]              Who to contact     If you have questions or need follow up  "information about today's clinic visit or your schedule please contact [facility] directly at [phone].  Normal or non-critical lab and imaging results will be communicated to you by MyChart, letter or phone within 4 business days after the clinic has received the results. If you do not hear from us within 7 days, please contact the clinic through MyChart or phone. If you have a critical or abnormal lab result, we will notify you by phone as soon as possible.  Submit refill requests through [employer] or call your pharmacy and they will forward the refill request to us. Please allow 3 business days for your refill to be completed.          Additional Information About Your Visit        Care EveryWhere ID     This is your Care EveryWhere ID. This could be used by other organizations to access your [facility] medical records  [id number]        Your Vitals Were     Pulse Temperature Respirations Height BMI (Body Mass Index)       56 97.4  F (36.3  C) (Oral) 12 5' 8\" (1.727 m) 28.43 kg/m2        Blood Pressure from Last 3 Encounters:   09/10/18 130/81   07/30/18 112/70   07/20/18 128/78    Weight from Last 3 Encounters:   09/10/18 187 lb (84.8 kg)   07/30/18 190 lb (86.2 kg)   07/20/18 192 lb (87.1 kg)              Today, you had the following     No orders found for display       Primary Care Provider Office Phone # Fax #    Reyes Hiknle [phone] [address] [facility] [address]        Equal Access to Services     [facility]: Hadii aad ku hadasho Soomaali, waaxda luqadaha, qaybta kaalmada adeegyada, josselyn vazquez. So [facility] [phone].    ATENCIÓN: Si habla español, tiene a wright disposición servicios gratuitos de asistencia lingüística. Llame al [phone].    We comply with applicable federal civil rights laws and [place] laws. We do not discriminate on the basis of race, color, national origin, age, disability, sex, sexual " orientation, or gender identity.            Thank you!     Thank you for choosing [facility]  for your care. Our goal is always to provide you with excellent care. Hearing back from our patients is one way we can continue to improve our services. Please take a few minutes to complete the written survey that you may receive in the mail after your visit with us. Thank you!             Your Updated Medication List - Protect others around you: Learn how to safely use, store and throw away your medicines at www.disposemymeds.org.          This list is accurate as of 9/10/18  4:45 PM.  Always use your most recent med list.                   Brand Name Dispense Instructions for use Diagnosis    aspirin 81 MG EC tablet      Take 81 mg by mouth daily.        atorvastatin 40 MG tablet    LIPITOR    90 tablet    TAKE ONE TABLET BY MOUTH EVERY DAY    Hyperlipidemia LDL goal <100       IBUPROFEN PO           ramipril 5 MG capsule    ALTACE    90 capsule    Take 1 capsule (5 mg) by mouth daily    Essential hypertension, benign

## 2018-09-20 NOTE — H&P (VIEW-ONLY)
[facility]  [address] [facility] [address]  [phone]  Dept: [phone]    PRE-OP EVALUATION:  Today's date: 9/10/2018    Vladimir Morse (: 1955) presents for pre-operative evaluation assessment as requested by Dr. Loving.  He requires evaluation and anesthesia risk assessment prior to undergoing surgery/procedure for treatment of sebaceous cystectomy .    Fax number for surgical facility: [facility]  Primary Physician: Reyes Hinkle  Type of Anesthesia Anticipated: to be determined    Patient has a Health Care Directive or Living Will:  NO    Preop Questions 9/10/2018   Who is doing your surgery? dr sheryl loving   What are you having done? cystectomy   Date of Surgery/Procedure: 18   Facility or Hospital where procedure/surgery will be performed: [facility]   1.  Do you have a history of Heart attack, stroke, stent, coronary bypass surgery, or other heart surgery? No   2.  Do you ever have any pain or discomfort in your chest? No   3.  Do you have a history of  Heart Failure? No   4.   Are you troubled by shortness of breath when:  walking on a level surface, or up a slight hill, or at night? No   5.  Do you currently have a cold, bronchitis or other respiratory infection? No   6.  Do you have a cough, shortness of breath, or wheezing? No   7.  Do you sometimes get pains in the calves of your legs when you walk? No   8. Do you or anyone in your family have previous history of blood clots? No   9.  Do you or does anyone in your family have a serious bleeding problem such as prolonged bleeding following surgeries or cuts? No   10. Have you ever had problems with anemia or been told to take iron pills? No   11. Have you had any abnormal blood loss such as black, tarry or bloody stools? No   12. Have you ever had a blood transfusion? No   13. Have you or any of your relatives ever had problems with anesthesia? No   14. Do you have sleep  apnea, excessive snoring or daytime drowsiness? No   15. Do you have any prosthetic heart valves? No   16. Do you have prosthetic joints? No     HPI:     HPI related to upcoming procedure: Excision subcutaneous mass and skin lesions right back    MEDICAL HISTORY:     Patient Active Problem List    Diagnosis Date Noted     Subungual hematoma of right foot, initial encounter 07/22/2018     Priority: Medium     Lipoma of skin and subcutaneous tissue 07/22/2018     Priority: Medium     Elevated blood pressure reading without diagnosis of hypertension 07/22/2018     Priority: Medium     Essential hypertension, benign 02/22/2018     Priority: Medium     Gilbert's syndrome 12/13/2016     Priority: Medium     BPPV (benign paroxysmal positional vertigo), unspecified laterality 07/03/2015     Priority: Medium     Hyperlipidemia LDL goal <100 11/28/2014     Priority: Medium     9.2 % 10 yr risk 10/2015       Advanced directives, counseling/discussion 08/16/2012     Priority: Medium     Discussed advance care planning with patient; however, patient declined at this time. 8/16/2012          Nephrolithiasis 06/22/2012     Priority: Medium     Presbycusis 06/22/2012     Priority: Medium     Cerebral aneurysm 08/04/2011     Priority: Medium     Diverticulitis of colon 06/29/2006     Priority: Medium     Problem list name updated by automated process. Provider to review        Past Medical History:   Diagnosis Date     BPPV (benign paroxysmal positional vertigo), unspecified laterality 7/3/2015     Cerebral aneurysm 8/4/2011    atherosclerotic     DIVERTICULITIS OF COLON W/O BLEED 6/29/2006     Elevated blood pressure reading without diagnosis of hypertension 7/22/2018     Essential hypertension, benign 2/22/2018     Gilbert's syndrome 12/13/2016     Hyperlipidemia LDL goal <100 11/28/2014     Knee pain 2/6/2009     Lipoma of skin and subcutaneous tissue 6/22/2012     Nephrolithiasis 6/22/2012     Pain in joint, shoulder region  9/18/2010     Presbycusis 6/22/2012     SBO (small bowel obstruction) 7/27/2012     Subungual hematoma of right foot, initial encounter 7/22/2018     TIA (transient ischaemic attack)      Unspecified intestinal obstruction     diverticulitis     Past Surgical History:   Procedure Laterality Date     ARTHROSCOPY KNEE Right 10/26/2015    Procedure: ARTHROSCOPY KNEE;  Surgeon: Rodrigue Cole MD;  Location:  OR     AS TOTAL KNEE ARTHROPLASTY Right 03/05/2018     C NONSPECIFIC PROCEDURE      Perforated bowel; infant     COLONOSCOPY  11/23/2015    Dr. Freedman [facility]     CYSTOSCOPY, RETROGRADES, INSERT STENT URETER(S), COMBINED  6/29/2012    Procedure: COMBINED CYSTOSCOPY, RETROGRADES, INSERT STENT URETER(S);  LEFT URETEROSCOPY, LEFT URETERAL STENT PLACEMENT;  Surgeon: Timothy Ortega MD;  Location:  OR     EXTRACORPOREAL SHOCK WAVE LITHOTRIPSY (ESWL)  12/20/2012    Procedure: EXTRACORPOREAL SHOCK WAVE LITHOTRIPSY (ESWL);  LEFT EXTRACORPOREAL SHOCKWAVE LITHOTRIPSY ;  Surgeon: Timothy Ortega MD;  Location:  OR     ORTHOPEDIC SURGERY      knee cyst removed     TONSILLECTOMY      T&A as a child     Current Outpatient Prescriptions   Medication Sig Dispense Refill     aspirin 81 MG EC tablet Take 81 mg by mouth daily.         atorvastatin (LIPITOR) 40 MG tablet TAKE ONE TABLET BY MOUTH EVERY DAY 90 tablet 2     IBUPROFEN PO        ramipril (ALTACE) 5 MG capsule Take 1 capsule (5 mg) by mouth daily 90 capsule 1     OTC products: None, except as noted above    Allergies   Allergen Reactions     No Known Drug Allergies       Latex Allergy: NO    Social History   Substance Use Topics     Smoking status: Never Smoker     Smokeless tobacco: Never Used     Alcohol use No      Comment: rarely     History   Drug Use No       REVIEW OF SYSTEMS:   CONSTITUTIONAL: NEGATIVE for fever, chills, change in weight  INTEGUMENTARY/SKIN: Per general surgery  EYES: NEGATIVE for vision changes or irritation  ENT/MOUTH: NEGATIVE for  "ear, mouth and throat problems  RESP: NEGATIVE for significant cough or SOB  CV: NEGATIVE for chest pain, palpitations or peripheral edema  GI: NEGATIVE for nausea, abdominal pain, heartburn, or change in bowel habits  : NEGATIVE for frequency, dysuria, or hematuria  PSYCHIATRIC: NEGATIVE for changes in mood or affect    This document serves as a record of the services and decisions personally performed and made by Reyes Hinkle MD. It was created on his behalf by Jose Cruz Santiago, a trained medical scribe.  The creation of this document is based on the scribe's personal observations and the provider's statements to the medical scribe.  Jose Cruz Santiago, September 10, 2018 4:43 PM  EXAM:   /81 (BP Location: Right arm, Patient Position: Chair, Cuff Size: Adult Regular)  Pulse 56  Temp 97.4  F (36.3  C) (Oral)  Resp 12  Ht 5' 8\" (1.727 m)  Wt 187 lb (84.8 kg)  BMI 28.43 kg/m2    GENERAL APPEARANCE: healthy, alert and no distress     HENT: ear canals and TM's normal and nose and mouth without ulcers or lesions     NECK: no adenopathy, no asymmetry, masses, or scars and thyroid normal to palpation     RESP: lungs clear to auscultation - no rales, rhonchi or wheezes     CV: regular rates and rhythm, normal S1 S2, no S3 or S4 and no murmur, click or rub     ABDOMEN:  soft, nontender, no HSM or masses and bowel sounds normal        SKIN: PER general surgery     NEURO:  mentation intact and speech normal     PSYCH: mentation appears normal. and affect normal/bright     LYMPHATICS: No cervical adenopathy    DIAGNOSTICS:   No labs or EKG required for low risk surgery (cataract, skin procedure, breast biopsy, etc)    Recent Labs   Lab Test  02/22/18   1522  12/12/16   1129   09/03/13   1456   HGB  15.5  14.9   < >   --    PLT  273  294   < >   --    NA  141  141   < >   --    POTASSIUM  3.9  3.8   < >   --    CR  0.70  0.66   < >   --    A1C   --    --    --   5.3    < > = values in this interval not displayed. "     IMPRESSION:   Reason for surgery/procedure:  Excision subcutaneous mass and skin lesions right back  Diagnosis/reason for consult: assess perioperative risk    The proposed surgical procedure is considered LOW risk.    REVISED CARDIAC RISK INDEX  The patient has the following serious cardiovascular risks for perioperative complications such as (MI, PE, VFib and 3  AV Block):  No serious cardiac risks  INTERPRETATION: 0 risks: Class I (very low risk - 0.4% complication rate)    The patient has the following additional risks for perioperative complications:  No identified additional risks      ICD-10-CM    1. Preop general physical exam Z01.818      RECOMMENDATIONS:     Hold Asprin 5 days before surgery.  Hold all medications morning of surgery.     APPROVAL GIVEN to proceed with proposed procedure, without further diagnostic evaluation     The information in this document, created by the medical scribe for me, accurately reflects the services I personally performed and the decisions made by me. I have reviewed and approved this document for accuracy prior to leaving the patient care area.  Reyes Hinkle MD September 10, 2018 4:45 PM    Signed Electronically by: Reyes Hinkle MD    Copy of this evaluation report is provided to requesting physician.    [facility] Preop Guidelines    Revised Cardiac Risk Index

## 2018-09-21 ENCOUNTER — HOSPITAL ENCOUNTER (OUTPATIENT)
Facility: CLINIC | Age: 63
Discharge: HOME | End: 2018-09-21
Attending: SURGERY | Admitting: SURGERY
Payer: COMMERCIAL

## 2018-09-21 ENCOUNTER — ANESTHESIA (OUTPATIENT)
Dept: SURGERY | Facility: CLINIC | Age: 63
End: 2018-09-21
Payer: COMMERCIAL

## 2018-09-21 ENCOUNTER — APPOINTMENT (OUTPATIENT)
Dept: SURGERY | Facility: PHYSICIAN GROUP | Age: 63
End: 2018-09-21
Payer: COMMERCIAL

## 2018-09-21 ENCOUNTER — ANESTHESIA EVENT (OUTPATIENT)
Dept: SURGERY | Facility: CLINIC | Age: 63
End: 2018-09-21
Payer: COMMERCIAL

## 2018-09-21 VITALS
WEIGHT: 186 LBS | HEIGHT: 68 IN | BODY MASS INDEX: 28.19 KG/M2 | HEART RATE: 59 BPM | DIASTOLIC BLOOD PRESSURE: 86 MMHG | RESPIRATION RATE: 12 BRPM | OXYGEN SATURATION: 99 % | TEMPERATURE: 96.8 F | SYSTOLIC BLOOD PRESSURE: 126 MMHG

## 2018-09-21 DIAGNOSIS — R22.2 MASS OF SUBCUTANEOUS TISSUE OF BACK: Primary | ICD-10-CM

## 2018-09-21 LAB
CREAT SERPL-MCNC: 0.75 MG/DL (ref 0.66–1.25)
GFR SERPL CREATININE-BSD FRML MDRD: >90 ML/MIN/1.7M2
POTASSIUM SERPL-SCNC: 4.4 MMOL/L (ref 3.4–5.3)

## 2018-09-21 PROCEDURE — 88305 TISSUE EXAM BY PATHOLOGIST: CPT | Mod: 26 | Performed by: SURGERY

## 2018-09-21 PROCEDURE — 88304 TISSUE EXAM BY PATHOLOGIST: CPT | Mod: 26 | Performed by: SURGERY

## 2018-09-21 PROCEDURE — 25000566 ZZH SEVOFLURANE, EA 15 MIN: Performed by: SURGERY

## 2018-09-21 PROCEDURE — 36000050 ZZH SURGERY LEVEL 2 1ST 30 MIN: Performed by: SURGERY

## 2018-09-21 PROCEDURE — 25000125 ZZHC RX 250: Performed by: SURGERY

## 2018-09-21 PROCEDURE — 40000306 ZZH STATISTIC PRE PROC ASSESS II: Performed by: SURGERY

## 2018-09-21 PROCEDURE — 37000009 ZZH ANESTHESIA TECHNICAL FEE, EACH ADDTL 15 MIN: Performed by: SURGERY

## 2018-09-21 PROCEDURE — 25000125 ZZHC RX 250: Performed by: NURSE ANESTHETIST, CERTIFIED REGISTERED

## 2018-09-21 PROCEDURE — 71000027 ZZH RECOVERY PHASE 2 EACH 15 MINS: Performed by: SURGERY

## 2018-09-21 PROCEDURE — 25000128 H RX IP 250 OP 636: Performed by: NURSE ANESTHETIST, CERTIFIED REGISTERED

## 2018-09-21 PROCEDURE — 88305 TISSUE EXAM BY PATHOLOGIST: CPT | Performed by: SURGERY

## 2018-09-21 PROCEDURE — 25000128 H RX IP 250 OP 636: Performed by: ANESTHESIOLOGY

## 2018-09-21 PROCEDURE — 82565 ASSAY OF CREATININE: CPT | Performed by: ANESTHESIOLOGY

## 2018-09-21 PROCEDURE — 84132 ASSAY OF SERUM POTASSIUM: CPT | Performed by: ANESTHESIOLOGY

## 2018-09-21 PROCEDURE — 88304 TISSUE EXAM BY PATHOLOGIST: CPT | Performed by: SURGERY

## 2018-09-21 PROCEDURE — 25000128 H RX IP 250 OP 636: Performed by: SURGERY

## 2018-09-21 PROCEDURE — 27210794 ZZH OR GENERAL SUPPLY STERILE: Performed by: SURGERY

## 2018-09-21 PROCEDURE — 37000008 ZZH ANESTHESIA TECHNICAL FEE, 1ST 30 MIN: Performed by: SURGERY

## 2018-09-21 PROCEDURE — 71000012 ZZH RECOVERY PHASE 1 LEVEL 1 FIRST HR: Performed by: SURGERY

## 2018-09-21 PROCEDURE — 93010 ELECTROCARDIOGRAM REPORT: CPT | Performed by: INTERNAL MEDICINE

## 2018-09-21 PROCEDURE — 11402 EXC TR-EXT B9+MARG 1.1-2 CM: CPT | Mod: 59 | Performed by: SURGERY

## 2018-09-21 PROCEDURE — 25000132 ZZH RX MED GY IP 250 OP 250 PS 637: Performed by: SURGERY

## 2018-09-21 PROCEDURE — 21931 EXC BACK LES SC 3 CM/>: CPT | Performed by: SURGERY

## 2018-09-21 PROCEDURE — 36000052 ZZH SURGERY LEVEL 2 EA 15 ADDTL MIN: Performed by: SURGERY

## 2018-09-21 PROCEDURE — 36415 COLL VENOUS BLD VENIPUNCTURE: CPT | Performed by: ANESTHESIOLOGY

## 2018-09-21 RX ORDER — FENTANYL CITRATE 50 UG/ML
25-50 INJECTION, SOLUTION INTRAMUSCULAR; INTRAVENOUS
Status: DISCONTINUED | OUTPATIENT
Start: 2018-09-21 | End: 2018-09-21 | Stop reason: HOSPADM

## 2018-09-21 RX ORDER — ONDANSETRON 2 MG/ML
4 INJECTION INTRAMUSCULAR; INTRAVENOUS EVERY 30 MIN PRN
Status: DISCONTINUED | OUTPATIENT
Start: 2018-09-21 | End: 2018-09-21 | Stop reason: HOSPADM

## 2018-09-21 RX ORDER — OXYCODONE HYDROCHLORIDE 5 MG/1
5-10 TABLET ORAL
Qty: 12 TABLET | Refills: 0 | Status: SHIPPED | OUTPATIENT
Start: 2018-09-21 | End: 2018-10-08

## 2018-09-21 RX ORDER — MEPERIDINE HYDROCHLORIDE 50 MG/ML
12.5 INJECTION INTRAMUSCULAR; INTRAVENOUS; SUBCUTANEOUS
Status: DISCONTINUED | OUTPATIENT
Start: 2018-09-21 | End: 2018-09-21 | Stop reason: HOSPADM

## 2018-09-21 RX ORDER — BUPIVACAINE HYDROCHLORIDE AND EPINEPHRINE 2.5; 5 MG/ML; UG/ML
INJECTION, SOLUTION EPIDURAL; INFILTRATION; INTRACAUDAL; PERINEURAL PRN
Status: DISCONTINUED | OUTPATIENT
Start: 2018-09-21 | End: 2018-09-21 | Stop reason: HOSPADM

## 2018-09-21 RX ORDER — OXYCODONE HYDROCHLORIDE 5 MG/1
5 TABLET ORAL
Status: COMPLETED | OUTPATIENT
Start: 2018-09-21 | End: 2018-09-21

## 2018-09-21 RX ORDER — PROPOFOL 10 MG/ML
INJECTION, EMULSION INTRAVENOUS PRN
Status: DISCONTINUED | OUTPATIENT
Start: 2018-09-21 | End: 2018-09-21

## 2018-09-21 RX ORDER — CEFAZOLIN SODIUM 2 G/100ML
2 INJECTION, SOLUTION INTRAVENOUS
Status: COMPLETED | OUTPATIENT
Start: 2018-09-21 | End: 2018-09-21

## 2018-09-21 RX ORDER — NEOSTIGMINE METHYLSULFATE 1 MG/ML
VIAL (ML) INJECTION PRN
Status: DISCONTINUED | OUTPATIENT
Start: 2018-09-21 | End: 2018-09-21

## 2018-09-21 RX ORDER — MAGNESIUM HYDROXIDE 1200 MG/15ML
LIQUID ORAL PRN
Status: DISCONTINUED | OUTPATIENT
Start: 2018-09-21 | End: 2018-09-21 | Stop reason: HOSPADM

## 2018-09-21 RX ORDER — SODIUM CHLORIDE, SODIUM LACTATE, POTASSIUM CHLORIDE, CALCIUM CHLORIDE 600; 310; 30; 20 MG/100ML; MG/100ML; MG/100ML; MG/100ML
INJECTION, SOLUTION INTRAVENOUS CONTINUOUS
Status: DISCONTINUED | OUTPATIENT
Start: 2018-09-21 | End: 2018-09-21 | Stop reason: HOSPADM

## 2018-09-21 RX ORDER — NALOXONE HYDROCHLORIDE 0.4 MG/ML
.1-.4 INJECTION, SOLUTION INTRAMUSCULAR; INTRAVENOUS; SUBCUTANEOUS
Status: DISCONTINUED | OUTPATIENT
Start: 2018-09-21 | End: 2018-09-21 | Stop reason: HOSPADM

## 2018-09-21 RX ORDER — HYDROMORPHONE HYDROCHLORIDE 1 MG/ML
.3-.5 INJECTION, SOLUTION INTRAMUSCULAR; INTRAVENOUS; SUBCUTANEOUS EVERY 10 MIN PRN
Status: DISCONTINUED | OUTPATIENT
Start: 2018-09-21 | End: 2018-09-21 | Stop reason: HOSPADM

## 2018-09-21 RX ORDER — HYDRALAZINE HYDROCHLORIDE 20 MG/ML
2.5-5 INJECTION INTRAMUSCULAR; INTRAVENOUS EVERY 10 MIN PRN
Status: DISCONTINUED | OUTPATIENT
Start: 2018-09-21 | End: 2018-09-21 | Stop reason: HOSPADM

## 2018-09-21 RX ORDER — CEFAZOLIN SODIUM 1 G/3ML
1 INJECTION, POWDER, FOR SOLUTION INTRAMUSCULAR; INTRAVENOUS SEE ADMIN INSTRUCTIONS
Status: DISCONTINUED | OUTPATIENT
Start: 2018-09-21 | End: 2018-09-21 | Stop reason: HOSPADM

## 2018-09-21 RX ORDER — GLYCOPYRROLATE 0.2 MG/ML
INJECTION, SOLUTION INTRAMUSCULAR; INTRAVENOUS PRN
Status: DISCONTINUED | OUTPATIENT
Start: 2018-09-21 | End: 2018-09-21

## 2018-09-21 RX ORDER — LIDOCAINE HYDROCHLORIDE 10 MG/ML
INJECTION, SOLUTION INFILTRATION; PERINEURAL PRN
Status: DISCONTINUED | OUTPATIENT
Start: 2018-09-21 | End: 2018-09-21

## 2018-09-21 RX ORDER — FENTANYL CITRATE 50 UG/ML
INJECTION, SOLUTION INTRAMUSCULAR; INTRAVENOUS PRN
Status: DISCONTINUED | OUTPATIENT
Start: 2018-09-21 | End: 2018-09-21

## 2018-09-21 RX ORDER — DEXAMETHASONE SODIUM PHOSPHATE 4 MG/ML
INJECTION, SOLUTION INTRA-ARTICULAR; INTRALESIONAL; INTRAMUSCULAR; INTRAVENOUS; SOFT TISSUE PRN
Status: DISCONTINUED | OUTPATIENT
Start: 2018-09-21 | End: 2018-09-21

## 2018-09-21 RX ORDER — ONDANSETRON 4 MG/1
4 TABLET, ORALLY DISINTEGRATING ORAL EVERY 30 MIN PRN
Status: DISCONTINUED | OUTPATIENT
Start: 2018-09-21 | End: 2018-09-21 | Stop reason: HOSPADM

## 2018-09-21 RX ORDER — LIDOCAINE 40 MG/G
CREAM TOPICAL
Status: DISCONTINUED | OUTPATIENT
Start: 2018-09-21 | End: 2018-09-21 | Stop reason: HOSPADM

## 2018-09-21 RX ORDER — ALBUTEROL SULFATE 0.83 MG/ML
2.5 SOLUTION RESPIRATORY (INHALATION) EVERY 4 HOURS PRN
Status: DISCONTINUED | OUTPATIENT
Start: 2018-09-21 | End: 2018-09-21 | Stop reason: HOSPADM

## 2018-09-21 RX ORDER — ONDANSETRON 2 MG/ML
INJECTION INTRAMUSCULAR; INTRAVENOUS PRN
Status: DISCONTINUED | OUTPATIENT
Start: 2018-09-21 | End: 2018-09-21

## 2018-09-21 RX ADMIN — GLYCOPYRROLATE 0.6 MG: 0.2 INJECTION, SOLUTION INTRAMUSCULAR; INTRAVENOUS at 08:06

## 2018-09-21 RX ADMIN — SODIUM CHLORIDE, POTASSIUM CHLORIDE, SODIUM LACTATE AND CALCIUM CHLORIDE: 600; 310; 30; 20 INJECTION, SOLUTION INTRAVENOUS at 06:40

## 2018-09-21 RX ADMIN — GLYCOPYRROLATE 0.2 MG: 0.2 INJECTION, SOLUTION INTRAMUSCULAR; INTRAVENOUS at 07:33

## 2018-09-21 RX ADMIN — PROPOFOL 200 MG: 10 INJECTION, EMULSION INTRAVENOUS at 07:33

## 2018-09-21 RX ADMIN — LIDOCAINE HYDROCHLORIDE 50 MG: 10 INJECTION, SOLUTION INFILTRATION; PERINEURAL at 07:33

## 2018-09-21 RX ADMIN — Medication 4 MG: at 08:06

## 2018-09-21 RX ADMIN — MIDAZOLAM 2 MG: 1 INJECTION INTRAMUSCULAR; INTRAVENOUS at 07:30

## 2018-09-21 RX ADMIN — ROCURONIUM BROMIDE 35 MG: 10 INJECTION INTRAVENOUS at 07:34

## 2018-09-21 RX ADMIN — CEFAZOLIN SODIUM 2 G: 2 INJECTION, SOLUTION INTRAVENOUS at 07:38

## 2018-09-21 RX ADMIN — DEXAMETHASONE SODIUM PHOSPHATE 4 MG: 4 INJECTION, SOLUTION INTRA-ARTICULAR; INTRALESIONAL; INTRAMUSCULAR; INTRAVENOUS; SOFT TISSUE at 07:34

## 2018-09-21 RX ADMIN — ONDANSETRON 4 MG: 2 INJECTION INTRAMUSCULAR; INTRAVENOUS at 07:58

## 2018-09-21 RX ADMIN — OXYCODONE HYDROCHLORIDE 5 MG: 5 TABLET ORAL at 09:05

## 2018-09-21 RX ADMIN — FENTANYL CITRATE 100 MCG: 50 INJECTION, SOLUTION INTRAMUSCULAR; INTRAVENOUS at 07:33

## 2018-09-21 NOTE — ANESTHESIA CARE TRANSFER NOTE
Patient: Vladimir Morse    Procedure(s):  Excision subcutaneous mass and skin lesion left back - Wound Class: I-Clean    Diagnosis: subcutaneous mass and skin lesions  Diagnosis Additional Information: No value filed.    Anesthesia Type:   General, ETT     Note:  Airway :Face Mask  Patient transferred to:PACU  Comments: Patient oral suctioned. Patient with spontaneous respirations and adequate tidal volumes. Patient awake and responsive. Extubated in OR to 8 L face tent. To PACU ventilating well. VSS. Report given.Handoff Report: Identifed the Patient, Identified the Reponsible Provider, Reviewed the pertinent medical history, Discussed the surgical course, Reviewed Intra-OP anesthesia mangement and issues during anesthesia, Set expectations for post-procedure period and Allowed opportunity for questions and acknowledgement of understanding      Vitals: (Last set prior to Anesthesia Care Transfer)    CRNA VITALS  9/21/2018 0750 - 9/21/2018 0826      9/21/2018             Pulse: 86    SpO2: 100 %                Electronically Signed By: VERO Boudreaux CRNA  September 21, 2018  8:26 AM

## 2018-09-21 NOTE — ANESTHESIA POSTPROCEDURE EVALUATION
Patient: Vladimir Morse    Procedure(s):  Excision subcutaneous mass and skin lesion left back - Wound Class: I-Clean    Diagnosis:subcutaneous mass and skin lesions  Diagnosis Additional Information: subcutaneous mass and skin lesion    Anesthesia Type:  General, ETT    Note:  Anesthesia Post Evaluation    Patient location during evaluation: PACU  Patient participation: Able to fully participate in evaluation  Level of consciousness: awake and alert  Pain management: adequate  Airway patency: patent  Cardiovascular status: acceptable  Respiratory status: acceptable  Hydration status: acceptable  PONV: none     Anesthetic complications: None          Last vitals:  Vitals:    09/21/18 0920 09/21/18 0935 09/21/18 0950   BP: 132/87 138/83 126/86   Pulse:      Resp: 14 12 12   Temp: 96.8  F (36  C)     SpO2: 98% 100% 99%         Electronically Signed By: Merritt Steward MD  September 21, 2018  10:53 AM

## 2018-09-21 NOTE — IP AVS SNAPSHOT
MRN:[id number]                      After Visit Summary   9/21/2018    Vladimir Morse    MRN: [id number]           Thank you!     Thank you for choosing [facility] for your care. Our goal is always to provide you with excellent care. Hearing back from our patients is one way we can continue to improve our services. Please take a few minutes to complete the written survey that you may receive in the mail after you visit. If you would like to speak to someone directly about your visit please contact Patient Relations at [phone]. Thank you!          Patient Information     Date Of Birth          1955        About your hospital stay     You were admitted on:  September 21, 2018 You last received care in the:  [facility]    You were discharged on:  September 21, 2018       Who to Call     For medical emergencies, please call 911.  For non-urgent questions about your medical care, please call your primary care provider or clinic, [phone]  For questions related to your surgery, please call your surgery clinic        Attending Provider     Provider Specialty    Prosper Loving MD General Surgery       Primary Care Provider Office Phone # Fax #    Reyes MORGAN Hinkle [phone]      Further instructions from your care team       HOME CARE FOLLOWING  SURGERY  RYAN Oliver, CHANDA Mackenzie, ERIKA Ventura    RESULTS:  If a biopsy of tissue was done, you may call for your final pathology report after 1p.m. two working days after surgery.  Your results will also be reviewed with you at your postoperative appointment.    INCISIONAL CARE:    If you have a dressing in place, keep clean and dry for 48 hours; you may replace the gauze if it becomes soiled.    After 48 hours you may remove the dressing and shower.  Do not submerse incision in water for 1 week.    Sutures which are beneath the skin will absorb and do not need to be  removed.    Sutures you can see should be removed at your surgeon's office in 10-14 days at the latest.    If present, leave the steri-strips (white paper tapes) in place for 21 days after surgery.    You may expect a small amount of drainage from your incision.    A lump/ridge under the incision is normal and will gradually resolve.  If it becomes red or very uncomfortable, contact the nurse at your surgeon's office to discuss whether this needs to be evaluated.    ACTIVITY:  Cautiously resume exercise and strenuous activities such as jogging, tennis, aerobics, etc. Also, be careful of stretching activities which affect the area of surgery for two weeks.    DIET:  No restrictions.  Increased fluid intake is recommended. While taking pain medications, increase dietary fiber or add a fiber supplementation like Metamucil or Citrucel to help prevent constipation - a possible side effect of pain medications.    DISCOMFORT:  Local anesthetic placed at surgery should provide relief for 4-8 hours.  Begin taking pain pills before discomfort is severe.  Take the pain medication with some food, when possible, to minimize side effects.  Intermittent use of ice packs may help during the first 48 hours.  Expect gradual improvement.  You may slowly transition to use of over-the-counter medications for pain relief when you are no longer requiring narcotics for pain control.    RETURN APPOINTMENT:  Schedule a follow-up visit 2-3 weeks post-op.  Office Phone:  [phone]     CONTACT US IF THE FOLLOWING DEVELOPS:   1. A fever that is above 101     2. If there is a large amount of drainage, bleeding, or swelling.   3. Severe pain that is not relieved by your prescription.   4. Drainage that is thick, cloudy, yellow, green or white.   5. Any other questions not answered by  Frequently Asked Questions  sheet.      FREQUENTLY ASKED QUESTIONS:    Q:  How should my incision look?    A:  Normally your incision will appear slightly swollen  with light redness directly along the incision itself as it heals.  It may feel like a bump or ridge as the healing/scarring happens, and over time (3-4 months) this bump or ridge feeling should slowly go away.  In general, clear or pink watery drainage can be normal at first as your incision heals, but should decrease over time.    Q:  How do I know if my incision is infected?  A:  Look at your incision for signs of infection, like redness around the incision spreading to surrounding skin, or drainage of cloudy or foul-smelling drainage.  If you feel warm, check your temperature to see if you are running a fever.    **If any of these things occur, please notify the nurse at our office.  We may need you to come into the office for an incision check.      Q:  How do I take care of my incision?  A:  If you have a dressing in place - Starting the day after surgery, replace the dressing 1-2 times a day until there is no further drainage from the incision.  At that time, a dressing is no longer needed.  Try to minimize tape on the skin if irritation is occurring at the tape sites.  If you have significant irritation from tape on the skin, please call the office to discuss other method of dressing your incision.    Small pieces of tape called  steri-strips  may be present directly overlying your incision; these may be removed 10 days after surgery unless otherwise specified by your surgeon.  If these tapes start to loosen at the ends, you may trim them back until they fall off or are removed.      Q:  There is a piece of tape or a sticky  lead  still on my skin.  Can I remove this?  A:  Sometimes the sticky  leads  used for monitoring during surgery or for evaluation in the emergency department are not all removed while you are in the hospital.  These sometimes have a tab or metal dot on them.  You can easily remove these on your own, like taking off a band-aid.  If there is a gel substance under the  lead , simply  wipe/clean it off with a washcloth or paper towel.      Q:  What can I do to minimize constipation (very hard stools, or lack of stools)?  A:  Stay well hydrated.  Increase your dietary fiber intake or take a fiber supplement -with plenty of water.  Walk around frequently.  You may consider an over-the-counter stool-softener.  Your Pharmacist can assist you with choosing one that is stocked at your pharmacy.  Constipation is also one of the most common side effects of pain medication.  If you are using pain medication, be pro-active and try to PREVENT problems with constipation by taking the steps above BEFORE constipation becomes a problem.    Q:  What do I do if I need more pain medications?  A:  Call the office to receive refills.  Be aware that certain pain meds cannot be called into a pharmacy and actually require a paper prescription.  A change may be made in your pain med as you progress thru your recovery period or if you have side effects to certain meds.    --Pain meds are NOT refilled after 5pm on weekdays, and NOT AT ALL on the weekends, so please look ahead to prevent problems.      Q:  Why am I having a hard time sleeping now that I am at home?  A:  Many medications you receive while you are in the hospital can impact your sleep for a number of days after your surgery/hospitalization.  Decreased level of activity and naps during the day may also make sleeping at night difficult.  Try to minimize day-time naps, and get up frequently during the day to walk around your home during your recovery time.  Sleep aides may be of some help, but are not recommended for long-term use.      Q:  I am having some back discomfort.  What should I do?  A:  This may be related to certain positioning that was required for your surgery, extended periods of time in bed, or other changes in your overall activity level.  You may try ice, heat, acetaminophen, or ibuprofen to treat this temporarily.  Note that many pain  medications have acetaminophen in them and would state this on the prescription bottle.  Be sure not to exceed the maximum of 4000mg per day of acetaminophen.     **If the pain you are having does not resolve, is severe, or is a flare of back pain you have had on other occasions prior to surgery, please contact your primary physician for further recommendations or for an appointment to be examined at their office.    Q:  Why am I having headaches?  A:  Headaches can be caused by many things:  caffeine withdrawal, use of pain meds, dehydration, high blood pressure, lack of sleep, over-activity/exhaustion, flare-up of usual migraine headaches.  If you feel this is related to muscle tension (a band-like feeling around the head, or a pressure at the low-back of the head) you may try ice or heat to this area.  You may need to drink more fluids (try electrolyte drink like Gatorade), rest, or take your usual migraine medications.   **If your headaches do not resolve, worsen, are accompanied by other symptoms, or if your blood pressure is high, please call your primary physician for recommendation and/or examination.    Q:  I am unable to urinate.  What do I do?  A:  A small percentage of people can have difficulty urinating initially after surgery.  This includes being able to urinate only a very small amount at a time and feeling discomfort or pressure in the very low abdomen.  This is called  urinary retention , and is actually an urgent situation.  Proceed to your nearest Emergency department for evaluation (not an Urgent Care Center).  Sometimes the bladder does not work correctly after certain medications you receive during surgery, or related to certain procedures.  You may need to have a catheter placed until your bladder recovers.  When planning to go to an Emergency department, it may help to call the ER to let them know you are coming in for this problem after a surgery.  This may help you get in quicker to be  evaluated.  **If you have symptoms of a urinary tract infection, please contact your primary physician for the proper evaluation and treatment.    If you have other questions, please call the office Monday thru Friday between 8am and 5pm to discuss with the nurse or physician assistant.  #[phone]    There is a surgeon ON CALL on weekday evenings and over the weekend in case of urgent need only, and may be contacted at the same number.    If you are having an emergency, call 911 or proceed to your nearest emergency department.    GENERAL ANESTHESIA OR SEDATION ADULT DISCHARGE INSTRUCTIONS   SPECIAL PRECAUTIONS FOR 24 HOURS AFTER SURGERY    IT IS NOT UNUSUAL TO FEEL LIGHT-HEADED OR FAINT, UP TO 24 HOURS AFTER SURGERY OR WHILE TAKING PAIN MEDICATION.  IF YOU HAVE THESE SYMPTOMS; SIT FOR A FEW MINUTES BEFORE STANDING AND HAVE SOMEONE ASSIST YOU WHEN YOU GET UP TO WALK OR USE THE BATHROOM.    YOU SHOULD REST AND RELAX FOR THE NEXT 24 HOURS AND YOU MUST MAKE ARRANGEMENTS TO HAVE SOMEONE STAY WITH YOU FOR AT LEAST 24 HOURS AFTER YOUR DISCHARGE.  AVOID HAZARDOUS AND STRENUOUS ACTIVITIES.  DO NOT MAKE IMPORTANT DECISIONS FOR 24 HOURS.    DO NOT DRIVE ANY VEHICLE OR OPERATE MECHANICAL EQUIPMENT FOR 24 HOURS FOLLOWING THE END OF YOUR SURGERY.  EVEN THOUGH YOU MAY FEEL NORMAL, YOUR REACTIONS MAY BE AFFECTED BY THE MEDICATION YOU HAVE RECEIVED.    DO NOT DRINK ALCOHOLIC BEVERAGES FOR 24 HOURS FOLLOWING YOUR SURGERY.    DRINK CLEAR LIQUIDS (APPLE JUICE, GINGER ALE, 7-UP, BROTH, ETC.).  PROGRESS TO YOUR REGULAR DIET AS YOU FEEL ABLE.    YOU MAY HAVE A DRY MOUTH, A SORE THROAT, MUSCLES ACHES OR TROUBLE SLEEPING.  THESE SHOULD GO AWAY AFTER 24 HOURS.    CALL YOUR DOCTOR FOR ANY OF THE FOLLOWING:  SIGNS OF INFECTION (FEVER, GROWING TENDERNESS AT THE SURGERY SITE, A LARGE AMOUNT OF DRAINAGE OR BLEEDING, SEVERE PAIN, FOUL-SMELLING DRAINAGE, REDNESS OR SWELLING.    IT HAS BEEN OVER 8 TO 10 HOURS SINCE SURGERY AND YOU ARE STILL  "NOT ABLE TO URINATE (PASS WATER).           Pending Results     Date and Time Order Name Status Description    2018 0748 Surgical pathology exam In process             Admission Information     Date & Time Provider Department Dept. Phone    2018 Prosper Loving MD [facility] [phone]      Your Vitals Were     Blood Pressure Pulse Temperature Respirations Height Weight    111/83 59 97.3  F (36.3  C) (Temporal) 18 1.727 m (5' 8\") 84.4 kg (186 lb)    Pulse Oximetry BMI (Body Mass Index)                100% 28.28 kg/m2          MyChart Information     [facility] lets you send messages to your doctor, view your test results, renew your prescriptions, schedule appointments and more. To sign up, go to [email or web address] . Click on \"Log in\" on the left side of the screen, which will take you to the Welcome page. Then click on \"Sign up Now\" on the right side of the page.     You will be asked to enter the access code listed below, as well as some personal information. Please follow the directions to create your username and password.     Your access code is: [email or web address]  Expires: 2018  9:06 AM     Your access code will  in 90 days. If you need help or a new code, please call your [place] clinic or [phone].        Care EveryWhere ID     This is your Care EveryWhere ID. This could be used by other organizations to access your [place] medical records  [id number]        Equal Access to Services     [facility]: Hadii aad ku hadasho Soomaali, waaxda luqadaha, qaybta kaalmada adeegyada, josselyn oseguera . So [facility] [phone].    ATENCIÓN: Si habla español, tiene a wright disposición servicios gratuitos de asistencia lingüística. Llame al [phone].    We comply with applicable federal civil rights laws and [place] laws. We do not discriminate on the basis of race, color, national origin, age, disability, sex, sexual orientation, or gender " identity.               Review of your medicines      START taking        Dose / Directions    oxyCODONE IR 5 MG tablet   Commonly known as:  ROXICODONE   Used for:  Mass of subcutaneous tissue of back        Dose:  5-10 mg   Take 1-2 tablets (5-10 mg) by mouth every 3 hours as needed for pain (Moderate to Severe)   Quantity:  12 tablet   Refills:  0         CONTINUE these medicines which have NOT CHANGED        Dose / Directions    aspirin 81 MG EC tablet        Dose:  81 mg   Take 81 mg by mouth daily.   Refills:  0       atorvastatin 40 MG tablet   Commonly known as:  LIPITOR   Used for:  Hyperlipidemia LDL goal <100        TAKE ONE TABLET BY MOUTH EVERY DAY   Quantity:  90 tablet   Refills:  2       IBUPROFEN PO        Refills:  0       ramipril 5 MG capsule   Commonly known as:  ALTACE   Used for:  Essential hypertension, benign        Dose:  5 mg   Take 1 capsule (5 mg) by mouth daily   Quantity:  90 capsule   Refills:  1            Where to get your medicines      Some of these will need a paper prescription and others can be bought over the counter. Ask your nurse if you have questions.     Bring a paper prescription for each of these medications     oxyCODONE IR 5 MG tablet                Protect others around you: Learn how to safely use, store and throw away your medicines at www.disposemymeds.org.        Information about OPIOIDS     PRESCRIPTION OPIOIDS: WHAT YOU NEED TO KNOW   We gave you an opioid (narcotic) pain medicine. It is important to manage your pain, but opioids are not always the best choice. You should first try all the other options your care team gave you. Take this medicine for as short a time (and as few doses) as possible.    Some activities can increase your pain, such as bandage changes or therapy sessions. It may help to take your pain medicine 30 to 60 minutes before these activities. Reduce your stress by getting enough sleep, working on hobbies you enjoy and practicing relaxation  or meditation. Talk to your care team about ways to manage your pain beyond prescription opioids.    These medicines have risks:    DO NOT drive when on new or higher doses of pain medicine. These medicines can affect your alertness and reaction times, and you could be arrested for driving under the influence (DUI). If you need to use opioids long-term, talk to your care team about driving.    DO NOT operate heavy machinery    DO NOT do any other dangerous activities while taking these medicines.    DO NOT drink any alcohol while taking these medicines.     If the opioid prescribed includes acetaminophen, DO NOT take with any other medicines that contain acetaminophen. Read all labels carefully. Look for the word  acetaminophen  or  Tylenol.  Ask your pharmacist if you have questions or are unsure.    You can get addicted to pain medicines, especially if you have a history of addiction (chemical, alcohol or substance dependence). Talk to your care team about ways to reduce this risk.    All opioids tend to cause constipation. Drink plenty of water and eat foods that have a lot of fiber, such as fruits, vegetables, prune juice, apple juice and high-fiber cereal. Take a laxative (Miralax, milk of magnesia, Colace, Senna) if you don t move your bowels at least every other day. Other side effects include upset stomach, sleepiness, dizziness, throwing up, tolerance (needing more of the medicine to have the same effect), physical dependence and slowed breathing.    Store your pills in a secure place, locked if possible. We will not replace any lost or stolen medicine. If you don t finish your medicine, please throw away (dispose) as directed by your pharmacist. The [place] Pollution Control Agency has more information about safe disposal: https://www.pca.[facility].mn.us/living-green/managing-unwanted-medications             Medication List: This is a list of all your medications and when to take them. Check marks below  indicate your daily home schedule. Keep this list as a reference.      Medications           Morning Afternoon Evening Bedtime As Needed    aspirin 81 MG EC tablet   Take 81 mg by mouth daily.                                atorvastatin 40 MG tablet   Commonly known as:  LIPITOR   TAKE ONE TABLET BY MOUTH EVERY DAY                                IBUPROFEN PO                                oxyCODONE IR 5 MG tablet   Commonly known as:  ROXICODONE   Take 1-2 tablets (5-10 mg) by mouth every 3 hours as needed for pain (Moderate to Severe)   Last time this was given:  5 mg on 9/21/2018  9:05 AM                                ramipril 5 MG capsule   Commonly known as:  ALTACE   Take 1 capsule (5 mg) by mouth daily

## 2018-09-21 NOTE — OP NOTE
General Surgery Brief Operative Note    Pre-operative diagnosis:  subcutaneous mass and skin lesion   Post-operative diagnosis: Same   Procedure:  Excision and closure left lower back skin lesion, excision of 12 cm subcutaneous mass left lower back   Surgeon: Prosper Loving MD   Assistant(s): Ismael Guevara PA-C   Anesthesia: General    Estimated blood loss: 3 cc's   Drains placed: None   Complications:  None   Findings:   subcutaneous mass grossly consistent with a lipoma   Specimens:   ID Type Source Tests Collected by Time Destination   A : Left Back Skin Lesion Tissue Back SURGICAL PATHOLOGY EXAM Prosper Loving MD 9/21/2018  7:47 AM    B : Left Back Subcutaneous Mass Tissue Back SURGICAL PATHOLOGY EXAM Prosper Loving MD 9/21/2018  7:55 AM      Indications: This 63-year-old man has developed the cutaneous mass on his left lower back which has been persistently enlarging.  He requests removal for control of symptoms and to prevent further growth.  Additionally, there is a raised variably pigmented skin lesion on his left lower back and he wishes to have this excised for pathologic evaluation.  These procedures risks benefits complications convalescence postop limitations bleeding infection seroma formation and recurrence were all reviewed with him preoperatively.  He seems understand the proposed procedure well and wishes to go ahead with surgery.    Description: Patient is brought to the operating room and after induction of anesthetic he is placed prone and carefully positioned and padded by anesthesia.  Sterile shave prep and drape were performed.  A pause is performed, the site had been marked with him and his wife in preinduction.  The skin lesion is excised and sent to pathology as a separate specimen.  Closure was performed with 2-0 and 4-0 PDS.  An incision was then made over the large palpable mass.  This is extended through the skin and superficial subcutaneous tissues to the mass which is then  carefully  from the surrounding adjacent normal subcutaneous tissues.  Once it was adequately mobilized, portion of it could be pulled up through the incision.  We spent some time gradually manipulating the mass through the incision rather than enlarging the incision.  When it was completely removed, the cavity was checked and rechecked both to assure complete hemostasis and complete removal.  Marcaine was instilled into the cavity for postop pain relief closure was performed with 2-0 and 4-0 PDS followed by Steri-Strips and dressings.  He is then returned to the recovery room in excellent condition with all sponge and needle counts correct having tolerated the procedure well.  Scrub tech measured the excised mass at 12 cm.    Prosper Loving MD

## 2018-09-21 NOTE — DISCHARGE INSTRUCTIONS
HOME CARE FOLLOWING  SURGERY  RYAN Oliver N. Guttormson, D. Maurer, ERIKA Ventura    RESULTS:  If a biopsy of tissue was done, you may call for your final pathology report after 1p.m. two working days after surgery.  Your results will also be reviewed with you at your postoperative appointment.    INCISIONAL CARE:    If you have a dressing in place, keep clean and dry for 48 hours; you may replace the gauze if it becomes soiled.    After 48 hours you may remove the dressing and shower.  Do not submerse incision in water for 1 week.    Sutures which are beneath the skin will absorb and do not need to be removed.    Sutures you can see should be removed at your surgeon's office in 10-14 days at the latest.    If present, leave the steri-strips (white paper tapes) in place for 21 days after surgery.    You may expect a small amount of drainage from your incision.    A lump/ridge under the incision is normal and will gradually resolve.  If it becomes red or very uncomfortable, contact the nurse at your surgeon's office to discuss whether this needs to be evaluated.    ACTIVITY:  Cautiously resume exercise and strenuous activities such as jogging, tennis, aerobics, etc. Also, be careful of stretching activities which affect the area of surgery for two weeks.    DIET:  No restrictions.  Increased fluid intake is recommended. While taking pain medications, increase dietary fiber or add a fiber supplementation like Metamucil or Citrucel to help prevent constipation - a possible side effect of pain medications.    DISCOMFORT:  Local anesthetic placed at surgery should provide relief for 4-8 hours.  Begin taking pain pills before discomfort is severe.  Take the pain medication with some food, when possible, to minimize side effects.  Intermittent use of ice packs may help during the first 48 hours.  Expect gradual improvement.  You may slowly transition to use of over-the-counter medications for pain relief when  you are no longer requiring narcotics for pain control.    RETURN APPOINTMENT:  Schedule a follow-up visit 2-3 weeks post-op.  Office Phone:  [phone]     CONTACT US IF THE FOLLOWING DEVELOPS:   1. A fever that is above 101     2. If there is a large amount of drainage, bleeding, or swelling.   3. Severe pain that is not relieved by your prescription.   4. Drainage that is thick, cloudy, yellow, green or white.   5. Any other questions not answered by  Frequently Asked Questions  sheet.      FREQUENTLY ASKED QUESTIONS:    Q:  How should my incision look?    A:  Normally your incision will appear slightly swollen with light redness directly along the incision itself as it heals.  It may feel like a bump or ridge as the healing/scarring happens, and over time (3-4 months) this bump or ridge feeling should slowly go away.  In general, clear or pink watery drainage can be normal at first as your incision heals, but should decrease over time.    Q:  How do I know if my incision is infected?  A:  Look at your incision for signs of infection, like redness around the incision spreading to surrounding skin, or drainage of cloudy or foul-smelling drainage.  If you feel warm, check your temperature to see if you are running a fever.    **If any of these things occur, please notify the nurse at our office.  We may need you to come into the office for an incision check.      Q:  How do I take care of my incision?  A:  If you have a dressing in place - Starting the day after surgery, replace the dressing 1-2 times a day until there is no further drainage from the incision.  At that time, a dressing is no longer needed.  Try to minimize tape on the skin if irritation is occurring at the tape sites.  If you have significant irritation from tape on the skin, please call the office to discuss other method of dressing your incision.    Small pieces of tape called  steri-strips  may be present directly overlying your incision;  these may be removed 10 days after surgery unless otherwise specified by your surgeon.  If these tapes start to loosen at the ends, you may trim them back until they fall off or are removed.      Q:  There is a piece of tape or a sticky  lead  still on my skin.  Can I remove this?  A:  Sometimes the sticky  leads  used for monitoring during surgery or for evaluation in the emergency department are not all removed while you are in the hospital.  These sometimes have a tab or metal dot on them.  You can easily remove these on your own, like taking off a band-aid.  If there is a gel substance under the  lead , simply wipe/clean it off with a washcloth or paper towel.      Q:  What can I do to minimize constipation (very hard stools, or lack of stools)?  A:  Stay well hydrated.  Increase your dietary fiber intake or take a fiber supplement -with plenty of water.  Walk around frequently.  You may consider an over-the-counter stool-softener.  Your Pharmacist can assist you with choosing one that is stocked at your pharmacy.  Constipation is also one of the most common side effects of pain medication.  If you are using pain medication, be pro-active and try to PREVENT problems with constipation by taking the steps above BEFORE constipation becomes a problem.    Q:  What do I do if I need more pain medications?  A:  Call the office to receive refills.  Be aware that certain pain meds cannot be called into a pharmacy and actually require a paper prescription.  A change may be made in your pain med as you progress thru your recovery period or if you have side effects to certain meds.    --Pain meds are NOT refilled after 5pm on weekdays, and NOT AT ALL on the weekends, so please look ahead to prevent problems.      Q:  Why am I having a hard time sleeping now that I am at home?  A:  Many medications you receive while you are in the hospital can impact your sleep for a number of days after your surgery/hospitalization.   Decreased level of activity and naps during the day may also make sleeping at night difficult.  Try to minimize day-time naps, and get up frequently during the day to walk around your home during your recovery time.  Sleep aides may be of some help, but are not recommended for long-term use.      Q:  I am having some back discomfort.  What should I do?  A:  This may be related to certain positioning that was required for your surgery, extended periods of time in bed, or other changes in your overall activity level.  You may try ice, heat, acetaminophen, or ibuprofen to treat this temporarily.  Note that many pain medications have acetaminophen in them and would state this on the prescription bottle.  Be sure not to exceed the maximum of 4000mg per day of acetaminophen.     **If the pain you are having does not resolve, is severe, or is a flare of back pain you have had on other occasions prior to surgery, please contact your primary physician for further recommendations or for an appointment to be examined at their office.    Q:  Why am I having headaches?  A:  Headaches can be caused by many things:  caffeine withdrawal, use of pain meds, dehydration, high blood pressure, lack of sleep, over-activity/exhaustion, flare-up of usual migraine headaches.  If you feel this is related to muscle tension (a band-like feeling around the head, or a pressure at the low-back of the head) you may try ice or heat to this area.  You may need to drink more fluids (try electrolyte drink like Gatorade), rest, or take your usual migraine medications.   **If your headaches do not resolve, worsen, are accompanied by other symptoms, or if your blood pressure is high, please call your primary physician for recommendation and/or examination.    Q:  I am unable to urinate.  What do I do?  A:  A small percentage of people can have difficulty urinating initially after surgery.  This includes being able to urinate only a very small amount at a  time and feeling discomfort or pressure in the very low abdomen.  This is called  urinary retention , and is actually an urgent situation.  Proceed to your nearest Emergency department for evaluation (not an Urgent Care Center).  Sometimes the bladder does not work correctly after certain medications you receive during surgery, or related to certain procedures.  You may need to have a catheter placed until your bladder recovers.  When planning to go to an Emergency department, it may help to call the ER to let them know you are coming in for this problem after a surgery.  This may help you get in quicker to be evaluated.  **If you have symptoms of a urinary tract infection, please contact your primary physician for the proper evaluation and treatment.    If you have other questions, please call the office Monday thru Friday between 8am and 5pm to discuss with the nurse or physician assistant.  #[phone]    There is a surgeon ON CALL on weekday evenings and over the weekend in case of urgent need only, and may be contacted at the same number.    If you are having an emergency, call 911 or proceed to your nearest emergency department.    GENERAL ANESTHESIA OR SEDATION ADULT DISCHARGE INSTRUCTIONS   SPECIAL PRECAUTIONS FOR 24 HOURS AFTER SURGERY    IT IS NOT UNUSUAL TO FEEL LIGHT-HEADED OR FAINT, UP TO 24 HOURS AFTER SURGERY OR WHILE TAKING PAIN MEDICATION.  IF YOU HAVE THESE SYMPTOMS; SIT FOR A FEW MINUTES BEFORE STANDING AND HAVE SOMEONE ASSIST YOU WHEN YOU GET UP TO WALK OR USE THE BATHROOM.    YOU SHOULD REST AND RELAX FOR THE NEXT 24 HOURS AND YOU MUST MAKE ARRANGEMENTS TO HAVE SOMEONE STAY WITH YOU FOR AT LEAST 24 HOURS AFTER YOUR DISCHARGE.  AVOID HAZARDOUS AND STRENUOUS ACTIVITIES.  DO NOT MAKE IMPORTANT DECISIONS FOR 24 HOURS.    DO NOT DRIVE ANY VEHICLE OR OPERATE MECHANICAL EQUIPMENT FOR 24 HOURS FOLLOWING THE END OF YOUR SURGERY.  EVEN THOUGH YOU MAY FEEL NORMAL, YOUR REACTIONS MAY BE AFFECTED BY THE  MEDICATION YOU HAVE RECEIVED.    DO NOT DRINK ALCOHOLIC BEVERAGES FOR 24 HOURS FOLLOWING YOUR SURGERY.    DRINK CLEAR LIQUIDS (APPLE JUICE, GINGER ALE, 7-UP, BROTH, ETC.).  PROGRESS TO YOUR REGULAR DIET AS YOU FEEL ABLE.    YOU MAY HAVE A DRY MOUTH, A SORE THROAT, MUSCLES ACHES OR TROUBLE SLEEPING.  THESE SHOULD GO AWAY AFTER 24 HOURS.    CALL YOUR DOCTOR FOR ANY OF THE FOLLOWING:  SIGNS OF INFECTION (FEVER, GROWING TENDERNESS AT THE SURGERY SITE, A LARGE AMOUNT OF DRAINAGE OR BLEEDING, SEVERE PAIN, FOUL-SMELLING DRAINAGE, REDNESS OR SWELLING.    IT HAS BEEN OVER 8 TO 10 HOURS SINCE SURGERY AND YOU ARE STILL NOT ABLE TO URINATE (PASS WATER).

## 2018-09-21 NOTE — ANESTHESIA PREPROCEDURE EVALUATION
Anesthesia Evaluation     . Pt has had prior anesthetic. Type: General    No history of anesthetic complications          ROS/MED HX    ENT/Pulmonary:  - neg pulmonary ROS     Neurologic:     (+)other neuro cerebral aneurysm, benign positional vertigo    Cardiovascular:     (+) Dyslipidemia, hypertension----. : . . . :. .       METS/Exercise Tolerance:     Hematologic:  - neg hematologic  ROS       Musculoskeletal:  - neg musculoskeletal ROS       GI/Hepatic:  - neg GI/hepatic ROS       Renal/Genitourinary:  - ROS Renal section negative       Endo:  - neg endo ROS       Psychiatric:  - neg psychiatric ROS       Infectious Disease:  - neg infectious disease ROS       Malignancy:      - no malignancy   Other: Comment: Subcutaneous cyst on back   - neg other ROS                 Physical Exam  Normal systems: cardiovascular, pulmonary and dental    Airway   Mallampati: I  TM distance: >3 FB  Neck ROM: full    Dental     Cardiovascular       Pulmonary                     Anesthesia Plan      History & Physical Review  History and physical reviewed and following examination; no interval change.    ASA Status:  3 .    NPO Status:  > 8 hours    Plan for General and ETT with Intravenous and Propofol induction. Maintenance will be Balanced.    PONV prophylaxis:  Ondansetron (or other 5HT-3) and Dexamethasone or Solumedrol       Postoperative Care  Postoperative pain management:  IV analgesics and Oral pain medications.      Consents  Anesthetic plan, risks, benefits and alternatives discussed with:  Patient or representative..                          .

## 2018-09-24 LAB
COPATH REPORT: NORMAL
INTERPRETATION ECG - MUSE: NORMAL

## 2018-10-08 ENCOUNTER — OFFICE VISIT (OUTPATIENT)
Dept: SURGERY | Facility: CLINIC | Age: 63
End: 2018-10-08
Payer: COMMERCIAL

## 2018-10-08 VITALS
HEIGHT: 68 IN | SYSTOLIC BLOOD PRESSURE: 120 MMHG | HEART RATE: 65 BPM | BODY MASS INDEX: 28.19 KG/M2 | OXYGEN SATURATION: 100 % | DIASTOLIC BLOOD PRESSURE: 78 MMHG | WEIGHT: 186 LBS

## 2018-10-08 DIAGNOSIS — Z09 SURGICAL FOLLOWUP VISIT: Primary | ICD-10-CM

## 2018-10-08 PROCEDURE — 99024 POSTOP FOLLOW-UP VISIT: CPT | Performed by: PHYSICIAN ASSISTANT

## 2018-10-08 NOTE — MR AVS SNAPSHOT
"              After Visit Summary   10/8/2018    Vladimir Morse    MRN: [id number]           Patient Information     Date Of Birth          1955        Visit Information        Provider Department      10/8/2018 4:00 PM Luz Marina Beasley PA-C [facility] General Surgery      Today's Diagnoses     Surgical followup visit    -  1       Follow-ups after your visit        Follow-up notes from your care team     Return if symptoms worsen or fail to improve.      Who to contact     If you have questions or need follow up information about today's clinic visit or your schedule please contact [facility] directly at [phone].  Normal or non-critical lab and imaging results will be communicated to you by [employer], letter or phone within 4 business days after the clinic has received the results. If you do not hear from us within 7 days, please contact the clinic through [employer] or phone. If you have a critical or abnormal lab result, we will notify you by phone as soon as possible.  Submit refill requests through [employer] or call your pharmacy and they will forward the refill request to us. Please allow 3 business days for your refill to be completed.          Additional Information About Your Visit        MyChart Information     [employer] lets you send messages to your doctor, view your test results, renew your prescriptions, schedule appointments and more. To sign up, go to [email or web address] . Click on \"Log in\" on the left side of the screen, which will take you to the Welcome page. Then click on \"Sign up Now\" on the right side of the page.     You will be asked to enter the access code listed below, as well as some personal information. Please follow the directions to create your username and password.     Your access code is: [email or web address]  Expires: 2018  9:06 AM     Your access code will  in 90 days. If you need help or a new " "code, please call your [facility] clinic or [phone].        Care EveryWhere ID     This is your Care EveryWhere ID. This could be used by other organizations to access your [facility] medical records  [id number]        Your Vitals Were     Pulse Height Pulse Oximetry BMI (Body Mass Index)          65 1.727 m (5' 8\") 100% 28.28 kg/m2         Blood Pressure from Last 3 Encounters:   10/08/18 120/78   09/21/18 126/86   09/10/18 130/81    Weight from Last 3 Encounters:   10/08/18 84.4 kg (186 lb)   09/21/18 84.4 kg (186 lb)   09/10/18 84.8 kg (187 lb)              Today, you had the following     No orders found for display       Primary Care Provider Office Phone # Fax #    Reyes Hinkle [phone] [address] [facility] [address]        Equal Access to Services     YASMANY GREENE : Hadii aad ku hadasho Soomaali, waaxda luqadaha, qaybta kaalmada adeegyada, waxay amilcarin hayrebcean marie oseguera . So [facility] [phone].    ATENCIÓN: Si surendrala español, tiene a wright disposición servicios gratuitos de asistencia lingüística. Llame al [phone].    We comply with applicable federal civil rights laws and [place] laws. We do not discriminate on the basis of race, color, national origin, age, disability, sex, sexual orientation, or gender identity.            Thank you!     Thank you for choosing [facility]  for your care. Our goal is always to provide you with excellent care. Hearing back from our patients is one way we can continue to improve our services. Please take a few minutes to complete the written survey that you may receive in the mail after your visit with us. Thank you!             Your Updated Medication List - Protect others around you: Learn how to safely use, store and throw away your medicines at www.disposemymeds.org.          This list is accurate as of 10/8/18  4:08 PM.  Always use your most recent med list.                   Brand Name Dispense " Instructions for use Diagnosis    aspirin 81 MG EC tablet      Take 81 mg by mouth daily.        atorvastatin 40 MG tablet    LIPITOR    90 tablet    TAKE ONE TABLET BY MOUTH EVERY DAY    Hyperlipidemia LDL goal <100       IBUPROFEN PO           ramipril 5 MG capsule    ALTACE    90 capsule    Take 1 capsule (5 mg) by mouth daily    Essential hypertension, benign

## 2018-10-08 NOTE — PROGRESS NOTES
10/8/2018    [facility] Note     Subjective:  Vladimir Morse is here for his first postoperative visit. He underwent excision of skin lesion and 12 cm subcutaneous mass at left lower back by Dr. Loving on 9/21/2018. Today he  tells me he has been feeling well since surgery.  He denies discomfort at the surgical site, and he has no concerns today.    Objective:  Inc - Healing well, well approximated and without signs of infection.    Assessment:  S/p excision of skin lesion and 12 cm subcutaneous mass at left lower back. The pathology confirms seborrheic keratosis and lipoma.    Plan:  RTC PRN      Luz Marina Beasley PA-C      Please route or send letter to:  Primary Care Provider (PCP)

## 2018-10-26 DIAGNOSIS — I67.1 CEREBRAL ANEURYSM: ICD-10-CM

## 2018-10-26 DIAGNOSIS — I10 ESSENTIAL HYPERTENSION, BENIGN: ICD-10-CM

## 2018-10-27 NOTE — TELEPHONE ENCOUNTER
"Requested Prescriptions   Pending Prescriptions Disp Refills     ramipril (ALTACE) 2.5 MG capsule [Pharmacy Med Name: RAMIPRIL 2.5MG CAPS]  Last Written Prescription Date:  6/15/2018  Last Fill Quantity: 90 capsule,  # refills: 1   Last office visit: 9/10/2018 with prescribing provider:  Arin      90 capsule 1     Sig: TAKE ONE CAPSULE BY MOUTH EVERY DAY    ACE Inhibitors (Including Combos) Protocol Passed    10/26/2018  7:07 PM       Passed - Blood pressure under 140/90 in past 12 months    BP Readings from Last 3 Encounters:   10/08/18 120/78   09/21/18 126/86   09/10/18 130/81                Passed - Recent (12 mo) or future (30 days) visit within the authorizing provider's specialty    Patient had office visit in the last 12 months or has a visit in the next 30 days with authorizing provider or within the authorizing provider's specialty.  See \"Patient Info\" tab in inbasket, or \"Choose Columns\" in Meds & Orders section of the refill encounter.             Passed - Patient is age 18 or older       Passed - Normal serum creatinine on file in past 12 months    Recent Labs   Lab Test  09/21/18   0646   CR  0.75            Passed - Normal serum potassium on file in past 12 months    Recent Labs   Lab Test  09/21/18   0646   POTASSIUM  4.4               "

## 2018-10-29 RX ORDER — RAMIPRIL 2.5 MG/1
CAPSULE ORAL
Status: SHIPPED
Start: 2018-10-29 | End: 2019-05-02 | Stop reason: DRUGHIGH

## 2019-01-30 DIAGNOSIS — E78.5 HYPERLIPIDEMIA LDL GOAL <100: ICD-10-CM

## 2019-01-30 NOTE — LETTER
January 31, 2019      Vladimir Morse  [address] [place] [facility] [address]        Dear Vladimir,     We recently received a call from your pharmacy requesting a refill of Atorvastatin.     A review of your chart indicates that an appointment is required with your provider for fasting physical- due 2/22/19. Please call the clinic at [phone] to schedule your appointment.     We have authorized one 90 day refill of your medication to allow time for you to schedule your appointment.      Taking care of your health is important to us and ongoing visits with your provider are vital to your care.  We look forward to seeing you in the near future.     Sincerely,        Reyes Hinkle MD/Angelica Vera RN

## 2019-01-30 NOTE — TELEPHONE ENCOUNTER
"Requested Prescriptions   Pending Prescriptions Disp Refills     atorvastatin (LIPITOR) 40 MG tablet [Pharmacy Med Name: ATORVASTATIN CALCIUM 40MG TABS] 90 tablet 2     Sig: TAKE ONE TABLET BY MOUTH EVERY DAY    Statins Protocol Passed - 1/30/2019  1:21 PM       Passed - LDL on file in past 12 months    Recent Labs   Lab Test 02/22/18  1522   LDL 61            Passed - No abnormal creatine kinase in past 12 months    No lab results found.            Passed - Recent (12 mo) or future (30 days) visit within the authorizing provider's specialty    Patient had office visit in the last 12 months or has a visit in the next 30 days with authorizing provider or within the authorizing provider's specialty.  See \"Patient Info\" tab in inbasket, or \"Choose Columns\" in Meds & Orders section of the refill encounter.             Passed - Medication is active on med list       Passed - Patient is age 18 or older        Last Written Prescription Date:  5/1/2018  Last Fill Quantity: 90,  # refills: 2   Last office visit: 9/10/2018 with prescribing provider:  Dr Hinkle  Future Office Visit:      "

## 2019-01-31 RX ORDER — ATORVASTATIN CALCIUM 40 MG/1
TABLET, FILM COATED ORAL
Qty: 90 TABLET | Refills: 0 | Status: SHIPPED | OUTPATIENT
Start: 2019-01-31 | End: 2019-05-01

## 2019-05-01 DIAGNOSIS — E78.5 HYPERLIPIDEMIA LDL GOAL <100: ICD-10-CM

## 2019-05-01 DIAGNOSIS — I10 ESSENTIAL HYPERTENSION, BENIGN: ICD-10-CM

## 2019-05-01 NOTE — LETTER
May 2, 2019      Vladimir Morse  [address] [place] [facility] [address]        Dear Vladimir,     We recently received a call from your pharmacy requesting a refill of Ramipril and Atorvastatin.     A review of your chart indicates that an appointment is required with your provider for med check.  Fasting labs also due at this visit. Please call the clinic at [phone] to schedule your appointment.     We have authorized one refill of your medication to allow time for you to schedule your appointment.      Taking care of your health is important to us and ongoing visits with your provider are vital to your care.  We look forward to seeing you in the near future.     Sincerely,        Reyes Hinkle MD/Angelica Vera RN

## 2019-05-02 RX ORDER — RAMIPRIL 5 MG/1
CAPSULE ORAL
Qty: 30 CAPSULE | Refills: 0 | Status: SHIPPED | OUTPATIENT
Start: 2019-05-02 | End: 2019-05-16

## 2019-05-02 RX ORDER — ATORVASTATIN CALCIUM 40 MG/1
TABLET, FILM COATED ORAL
Qty: 30 TABLET | Refills: 0 | Status: SHIPPED | OUTPATIENT
Start: 2019-05-02 | End: 2019-05-16

## 2019-05-02 NOTE — TELEPHONE ENCOUNTER
Due for visit and FLP.  Letter sent.  One month sent.  Angelica Vera RN    9/10/18  RECOMMENDATIONS:      Hold Asprin 5 days before surgery.  Hold all medications morning of surgery.      APPROVAL GIVEN to proceed with proposed procedure, without further diagnostic evaluation     The information in this document, created by the medical scribe for me, accurately reflects the services I personally performed and the decisions made by me. I have reviewed and approved this document for accuracy prior to leaving the patient care area.  Reyes Hinkle MD September 10, 2018 4:45 PM     Signed Electronically by: Reyes Hinkle MD     Copy of this evaluation report is provided to requesting physician.     [place] Preop Guidelines     Revised Cardiac Risk Index      Other Notes      All notes   Instructions         Return in about 5 months (around 2/10/2019).

## 2019-05-16 ENCOUNTER — OFFICE VISIT (OUTPATIENT)
Dept: FAMILY MEDICINE | Facility: CLINIC | Age: 64
End: 2019-05-16
Payer: COMMERCIAL

## 2019-05-16 VITALS
WEIGHT: 188 LBS | BODY MASS INDEX: 28.59 KG/M2 | TEMPERATURE: 97.6 F | HEART RATE: 63 BPM | OXYGEN SATURATION: 100 % | SYSTOLIC BLOOD PRESSURE: 118 MMHG | DIASTOLIC BLOOD PRESSURE: 78 MMHG

## 2019-05-16 DIAGNOSIS — I10 ESSENTIAL HYPERTENSION, BENIGN: ICD-10-CM

## 2019-05-16 DIAGNOSIS — N52.9 VASCULOGENIC ERECTILE DYSFUNCTION, UNSPECIFIED VASCULOGENIC ERECTILE DYSFUNCTION TYPE: ICD-10-CM

## 2019-05-16 DIAGNOSIS — E78.5 HYPERLIPIDEMIA LDL GOAL <100: ICD-10-CM

## 2019-05-16 DIAGNOSIS — Z00.00 ENCOUNTER FOR PREVENTIVE HEALTH EXAMINATION: Primary | ICD-10-CM

## 2019-05-16 LAB
PSA SERPL-ACNC: 1.41 UG/L (ref 0–4)
TSH SERPL DL<=0.005 MIU/L-ACNC: 0.82 MU/L (ref 0.4–4)

## 2019-05-16 PROCEDURE — 80061 LIPID PANEL: CPT | Performed by: FAMILY MEDICINE

## 2019-05-16 PROCEDURE — 80053 COMPREHEN METABOLIC PANEL: CPT | Performed by: FAMILY MEDICINE

## 2019-05-16 PROCEDURE — 99213 OFFICE O/P EST LOW 20 MIN: CPT | Mod: 25 | Performed by: FAMILY MEDICINE

## 2019-05-16 PROCEDURE — 84443 ASSAY THYROID STIM HORMONE: CPT | Performed by: FAMILY MEDICINE

## 2019-05-16 PROCEDURE — 87389 HIV-1 AG W/HIV-1&-2 AB AG IA: CPT | Performed by: FAMILY MEDICINE

## 2019-05-16 PROCEDURE — 99396 PREV VISIT EST AGE 40-64: CPT | Performed by: FAMILY MEDICINE

## 2019-05-16 PROCEDURE — G0103 PSA SCREENING: HCPCS | Performed by: FAMILY MEDICINE

## 2019-05-16 PROCEDURE — 36415 COLL VENOUS BLD VENIPUNCTURE: CPT | Performed by: FAMILY MEDICINE

## 2019-05-16 RX ORDER — ATORVASTATIN CALCIUM 40 MG/1
40 TABLET, FILM COATED ORAL DAILY
Qty: 90 TABLET | Refills: 3 | Status: SHIPPED | OUTPATIENT
Start: 2019-05-16 | End: 2020-06-17

## 2019-05-16 RX ORDER — SILDENAFIL 100 MG/1
100 TABLET, FILM COATED ORAL DAILY PRN
Qty: 6 TABLET | Refills: 3 | Status: SHIPPED | OUTPATIENT
Start: 2019-05-16 | End: 2022-02-09

## 2019-05-16 RX ORDER — RAMIPRIL 5 MG/1
5 CAPSULE ORAL DAILY
Qty: 90 CAPSULE | Refills: 1 | Status: SHIPPED | OUTPATIENT
Start: 2019-05-16 | End: 2019-12-16

## 2019-05-16 ASSESSMENT — ENCOUNTER SYMPTOMS
COUGH: 0
CONSTIPATION: 0
HEMATOCHEZIA: 0
CHILLS: 0
HEMATURIA: 0
ABDOMINAL PAIN: 0

## 2019-05-16 NOTE — LETTER
May 20, 2019      Vladimir BLANCO Morse  [address] [place] [facility] [address]        Dear ,    We are writing to inform you of your test results.    Tests are all normal.  The bilirubin is meaningless.That's Enzyme defect, will never cause you trouble.   SAPNA MORGAN RIVERA     Resulted Orders   HIV Antigen Antibody Combo   Result Value Ref Range    HIV Antigen Antibody Combo Nonreactive NR^Nonreactive          Comment:      HIV-1 p24 Ag & HIV-1/HIV-2 Ab Not Detected   Comprehensive metabolic panel   Result Value Ref Range    Sodium 142 133 - 144 mmol/L    Potassium 4.5 3.4 - 5.3 mmol/L    Chloride 107 94 - 109 mmol/L    Carbon Dioxide 30 20 - 32 mmol/L    Anion Gap 5 3 - 14 mmol/L    Glucose 85 70 - 99 mg/dL      Comment:      Fasting specimen    Urea Nitrogen 18 7 - 30 mg/dL    Creatinine 0.71 0.66 - 1.25 mg/dL    GFR Estimate >90 >60 mL/min/[1.73_m2]      Comment:      Non  GFR Calc  Starting 12/18/2018, serum creatinine based estimated GFR (eGFR) will be   calculated using the Chronic Kidney Disease Epidemiology Collaboration   (CKD-EPI) equation.      GFR Estimate If Black >90 >60 mL/min/[1.73_m2]      Comment:       GFR Calc  Starting 12/18/2018, serum creatinine based estimated GFR (eGFR) will be   calculated using the Chronic Kidney Disease Epidemiology Collaboration   (CKD-EPI) equation.      Calcium 9.1 8.5 - 10.1 mg/dL    Bilirubin Total 2.9 (H) 0.2 - 1.3 mg/dL    Albumin 4.2 3.4 - 5.0 g/dL    Protein Total 7.8 6.8 - 8.8 g/dL    Alkaline Phosphatase 119 40 - 150 U/L    ALT 45 0 - 70 U/L    AST 24 0 - 45 U/L   Lipid panel reflex to direct LDL Fasting   Result Value Ref Range    Cholesterol 129 <200 mg/dL    Triglycerides 86 <150 mg/dL      Comment:      Fasting specimen    HDL Cholesterol 50 >39 mg/dL    LDL Cholesterol Calculated 62 <100 mg/dL      Comment:      Desirable:       <100 mg/dl    Non HDL Cholesterol 79 <130 mg/dL   PSA, screen   Result Value Ref Range     PSA 1.41 0 - 4 ug/L      Comment:      Assay Method:  Chemiluminescence using Siemens Vista analyzer   TSH with free T4 reflex   Result Value Ref Range    TSH 0.82 0.40 - 4.00 mU/L       If you have any questions or concerns, please call the clinic at the number listed above.       Sincerely,        Reyes Hinkle MD/yonathan

## 2019-05-16 NOTE — PROGRESS NOTES
Answers for HPI/ROS submitted by the patient on 5/16/2019   Annual Exam:  Frequency of exercise:: 4-5 days/week  Getting at least 3 servings of Calcium per day:: Yes  Diet:: Regular (no restrictions)  Taking medications regularly:: Yes  Medication side effects:: None  Bi-annual eye exam:: Yes  Dental care twice a year:: Yes  Sleep apnea or symptoms of sleep apnea:: None  abdominal pain: No  Blood in stool: No  Blood in urine: No  chest pain: No  chills: No  congestion: No  constipation: No  cough: No  Additional concerns today:: No  Duration of exercise:: 30-45 minutes

## 2019-05-16 NOTE — PROGRESS NOTES
SUBJECTIVE:   CC: Vladimir Morse is an 63 year old male who presents for preventative health visit.     Healthy Habits:     Getting at least 3 servings of Calcium per day:  Yes    Bi-annual eye exam:  Yes    Dental care twice a year:  Yes    Sleep apnea or symptoms of sleep apnea:  None    Diet:  Regular (no restrictions)    Frequency of exercise:  4-5 days/week    Duration of exercise:  30-45 minutes    Taking medications regularly:  Yes    Medication side effects:  None    PHQ-2 Total Score: 0    Additional concerns today:  No        Today's PHQ-2 Score:   PHQ-2 ( 1999 [employer]) 5/16/2019   Q1: Little interest or pleasure in doing things 0   Q2: Feeling down, depressed or hopeless 0   PHQ-2 Score 0   Q1: Little interest or pleasure in doing things Not at all   Q2: Feeling down, depressed or hopeless Not at all   PHQ-2 Score 0       Abuse: Current or Past(Physical, Sexual or Emotional)- No  Do you feel safe in your environment? Yes    Social History     Tobacco Use     Smoking status: Never Smoker     Smokeless tobacco: Never Used   Substance Use Topics     Alcohol use: No     Comment: rarely         Alcohol Use 5/16/2019   Prescreen: >3 drinks/day or >7 drinks/week? No   Prescreen: >3 drinks/day or >7 drinks/week? -       Last PSA:   PSA   Date Value Ref Range Status   05/25/2011 0.62 0 - 4 ug/L Final       Reviewed orders with patient. Reviewed health maintenance and updated orders accordingly - Yes      Reviewed and updated as needed this visit by clinical staff  Tobacco  Allergies  Meds  Med Hx  Surg Hx  Fam Hx  Soc Hx        Reviewed and updated as needed this visit by Provider        Past Medical History:   Diagnosis Date     BPPV (benign paroxysmal positional vertigo), unspecified laterality 7/3/2015     Cerebral aneurysm 8/4/2011    atherosclerotic     DIVERTICULITIS OF COLON W/O BLEED 6/29/2006     Elevated blood pressure reading without diagnosis of hypertension 7/22/2018     Essential hypertension,  benign 2/22/2018     Gilbert's syndrome 12/13/2016     Hyperlipidemia LDL goal <100 11/28/2014     Knee pain 2/6/2009     Lipoma of skin and subcutaneous tissue 6/22/2012     Nephrolithiasis 6/22/2012     Pain in joint, shoulder region 9/18/2010     Presbycusis 6/22/2012     SBO (small bowel obstruction) (H) 7/27/2012     Subungual hematoma of right foot, initial encounter 7/22/2018     TIA (transient ischaemic attack)      Unspecified intestinal obstruction     diverticulitis     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 Tano OLMEDO     CYSTOSCOPY, RETROGRADES, INSERT STENT URETER(S), COMBINED  6/29/2012    Procedure: COMBINED CYSTOSCOPY, RETROGRADES, INSERT STENT URETER(S);  LEFT URETEROSCOPY, LEFT URETERAL STENT PLACEMENT;  Surgeon: Timothy Ortega MD;  Location:  OR     EXCISE LESION BACK Left 9/21/2018    Procedure: EXCISE LESION BACK;  Excision subcutaneous mass and skin lesion left back;  Surgeon: Prosper Loving MD;  Location:  OR     EXTRACORPOREAL SHOCK WAVE LITHOTRIPSY (ESWL)  12/20/2012    Procedure: EXTRACORPOREAL SHOCK WAVE LITHOTRIPSY (ESWL);  LEFT EXTRACORPOREAL SHOCKWAVE LITHOTRIPSY ;  Surgeon: Timothy Ortega MD;  Location:  OR     ORTHOPEDIC SURGERY      knee cyst removed     TONSILLECTOMY      T&A as a child       Family History   Problem Relation Age of Onset     Breast Cancer Mother      Cancer - colorectal Father      Colon Cancer Father      Breast Cancer Sister        Social History     Tobacco Use     Smoking status: Never Smoker     Smokeless tobacco: Never Used   Substance Use Topics     Alcohol use: No     Comment: rarely         Review of Systems    Constitutional: Negative for chills.   HENT: Negative for congestion.    Respiratory: Negative for cough.    Cardiovascular: Negative for chest pain.   Gastrointestinal: Negative for abdominal pain, constipation and hematochezia.   Genitourinary: Negative for hematuria.           This document serves as a record of the services and decisions personally performed and made by Reyes Hinkle MD. It was created on his behalf by Silvano Barahona, a trained medical scribe. The creation of this document is based on the provider's statements to the medical scribe.  Silvano Barahona May 16, 2019 9:49 AM    OBJECTIVE:   /78 (BP Location: Right arm, Patient Position: Sitting, Cuff Size: Adult Regular)   Pulse 63   Temp 97.6  F (36.4  C) (Oral)   Wt 85.3 kg (188 lb)   SpO2 100%   BMI 28.59 kg/m      Physical Exam  EYES: Eyes grossly normal to inspection, PERRL and conjunctivae and sclerae normal  HENT: ear canals and TM's normal after lavage.  nose and mouth without ulcers or lesions  NECK: no adenopathy, no asymmetry, masses, or scars and thyroid normal to palpation  RESP: lungs clear to auscultation - no rales, rhonchi or wheezes  CV: regular rate and rhythm, normal S1 S2, no S3 or S4, no murmur, click or rub, no peripheral edema  ABDOMEN: soft, nontender, no hepatosplenomegaly, no masses and bowel sounds normal  MS: no gross musculoskeletal defects noted, no edema  SKIN: no suspicious lesions or rashes      ASSESSMENT/PLAN:   (Z00.00) Encounter for preventive health examination  (primary encounter diagnosis)  Comment: Completed  Plan: HIV Antigen Antibody Combo, Comprehensive         metabolic panel, Lipid panel reflex to direct         LDL Fasting, PSA, screen, TSH with free T4         Reflex [place] HIV testing introduced, rationale reviewed, all questions answered, permission granted to test              (E78.5) Hyperlipidemia LDL goal <100  Comment: Treated, continue  Plan: Comprehensive metabolic panel, Lipid panel  "        reflex to direct LDL Fasting, atorvastatin         (LIPITOR) 40 MG tablet            (I10) Essential hypertension, benign  Comment: Controlled, continue  Plan: Comprehensive metabolic panel, ramipril         (ALTACE) 5 MG capsule incidence of the ED as a side effect of this medication is low                          COUNSELING:   Reviewed preventive health counseling, as reflected in patient instructions       Prostate cancer screening      Estimated body mass index is 28.59 kg/m  as calculated from the following:    Height as of 10/8/18: 1.727 m (5' 8\").    Weight as of this encounter: 85.3 kg (188 lb).     Weight management plan: Discussed healthy diet and exercise guidelines     reports that he has never smoked. He has never used smokeless tobacco.      Counseling Resources:  ATP IV Guidelines  Pooled Cohorts Equation Calculator  FRAX Risk Assessment  ICSI Preventive Guidelines  Dietary Guidelines for Americans, 2010  USDA's MyPlate  ASA Prophylaxis  Lung CA Screening    The information in this document, created by the medical scribe for me, accurately reflects the services I personally performed and the decisions made by me. I have reviewed and approved this document for accuracy prior to leaving the patient care area.  May 16, 2019 9:49 AM    Reyes Hinkle MD  [facility]  "

## 2019-05-16 NOTE — PROGRESS NOTES
Vasculogenic erectile dysfunction, unspecified vasculogenic erectile dysfunction type long duration.  No therapies have been tried.  Libido has faded also  ROS nocturia x1    /78 (BP Location: Right arm, Patient Position: Sitting, Cuff Size: Adult Regular)   Pulse 63   Temp 97.6  F (36.4  C) (Oral)   Wt 85.3 kg (188 lb)   SpO2 100%   BMI 28.59 kg/m    ASSESSMENT / PLAN:    (N52.9) Vasculogenic erectile dysfunction, unspecified vasculogenic erectile dysfunction type  Comment: Discussed differential treatment options  Plan: TSH with free T4 reflex, sildenafil (VIAGRA)         100 MG tablet       Evaluate,  trial of therapy          Reyes Hinkle MD

## 2019-05-17 LAB
ALBUMIN SERPL-MCNC: 4.2 G/DL (ref 3.4–5)
ALP SERPL-CCNC: 119 U/L (ref 40–150)
ALT SERPL W P-5'-P-CCNC: 45 U/L (ref 0–70)
ANION GAP SERPL CALCULATED.3IONS-SCNC: 5 MMOL/L (ref 3–14)
AST SERPL W P-5'-P-CCNC: 24 U/L (ref 0–45)
BILIRUB SERPL-MCNC: 2.9 MG/DL (ref 0.2–1.3)
BUN SERPL-MCNC: 18 MG/DL (ref 7–30)
CALCIUM SERPL-MCNC: 9.1 MG/DL (ref 8.5–10.1)
CHLORIDE SERPL-SCNC: 107 MMOL/L (ref 94–109)
CHOLEST SERPL-MCNC: 129 MG/DL
CO2 SERPL-SCNC: 30 MMOL/L (ref 20–32)
CREAT SERPL-MCNC: 0.71 MG/DL (ref 0.66–1.25)
GFR SERPL CREATININE-BSD FRML MDRD: >90 ML/MIN/{1.73_M2}
GLUCOSE SERPL-MCNC: 85 MG/DL (ref 70–99)
HDLC SERPL-MCNC: 50 MG/DL
HIV 1+2 AB+HIV1 P24 AG SERPL QL IA: NONREACTIVE
LDLC SERPL CALC-MCNC: 62 MG/DL
NONHDLC SERPL-MCNC: 79 MG/DL
POTASSIUM SERPL-SCNC: 4.5 MMOL/L (ref 3.4–5.3)
PROT SERPL-MCNC: 7.8 G/DL (ref 6.8–8.8)
SODIUM SERPL-SCNC: 142 MMOL/L (ref 133–144)
TRIGL SERPL-MCNC: 86 MG/DL

## 2019-05-17 NOTE — RESULT ENCOUNTER NOTE
Tests are all normal.  The bilirubin is meaningless.That's Enzyme defect, will never cause you trouble  SAPNA RIVERA

## 2019-06-17 NOTE — IP AVS SNAPSHOT
[facility]    [address]    [facility] [address]    Phone:  [phone]    Fax:  [phone]                                       After Visit Summary   9/21/2018    Vladimir Morse    MRN: [id number]           After Visit Summary Signature Page     I have received my discharge instructions, and my questions have been answered. I have discussed any challenges I see with this plan with the nurse or doctor.    ..........................................................................................................................................  Patient/Patient Representative Signature      ..........................................................................................................................................  Patient Representative Print Name and Relationship to Patient    ..................................................               ................................................  Date                                   Time    ..........................................................................................................................................  Reviewed by Signature/Title    ...................................................              ..............................................  Date                                               Time          22EPIC Rev 08/18         Noted. Aleida Moore PA-C

## 2019-10-22 ENCOUNTER — OFFICE VISIT (OUTPATIENT)
Dept: FAMILY MEDICINE | Facility: CLINIC | Age: 64
End: 2019-10-22
Payer: COMMERCIAL

## 2019-10-22 VITALS
OXYGEN SATURATION: 98 % | HEIGHT: 68 IN | DIASTOLIC BLOOD PRESSURE: 82 MMHG | RESPIRATION RATE: 16 BRPM | SYSTOLIC BLOOD PRESSURE: 130 MMHG | BODY MASS INDEX: 29.55 KG/M2 | HEART RATE: 61 BPM | WEIGHT: 195 LBS | TEMPERATURE: 98 F

## 2019-10-22 DIAGNOSIS — R22.9 LOCALIZED SUPERFICIAL SWELLING, MASS, OR LUMP: Primary | ICD-10-CM

## 2019-10-22 PROCEDURE — 99213 OFFICE O/P EST LOW 20 MIN: CPT | Performed by: NURSE PRACTITIONER

## 2019-10-22 ASSESSMENT — MIFFLIN-ST. JEOR: SCORE: 1649.01

## 2019-10-22 NOTE — PROGRESS NOTES
"Subjective     Vladimir Morse is a 64 year old male who presents to clinic today for the following health issues:    HPI   Joint Pain    Onset: x 2-3 weeks    Description:   Location: Left upper arm shoulder  Character: Sharp    Intensity: 4-5/10    Progression of Symptoms: worse    Accompanying Signs & Symptoms:  Other symptoms: radiation of pain to elbow    History:   Previous similar pain: no       Precipitating factors:   Trauma or overuse: no     Alleviating factors:  Improved by: nothing    Therapies Tried and outcome: nothing      Has a bump on the L upper arm.  Has slowly gotten bigger.  It is sore if he moves the arm or pushes on the bump.   No numbness or tingling in the arm. No weakness.   No shoulder pain.    Had a mass removed from his back last year.      Current Outpatient Medications   Medication Sig Dispense Refill     aspirin 81 MG EC tablet Take 81 mg by mouth daily.         atorvastatin (LIPITOR) 40 MG tablet Take 1 tablet (40 mg) by mouth daily 90 tablet 3     IBUPROFEN PO        ramipril (ALTACE) 5 MG capsule Take 1 capsule (5 mg) by mouth daily 90 capsule 1     sildenafil (VIAGRA) 100 MG tablet Take 1 tablet (100 mg) by mouth daily as needed (Patient not taking: Reported on 10/22/2019) 6 tablet 3     Allergies   Allergen Reactions     No Known Drug Allergies        Reviewed and updated as needed this visit by Provider         Review of Systems   ROS COMP: Constitutional, HEENT, cardiovascular, pulmonary, gi and gu, and MS systems are negative, except as otherwise noted.      Objective    /82 (BP Location: Right arm, Patient Position: Sitting, Cuff Size: Adult Large)   Pulse 61   Temp 98  F (36.7  C) (Oral)   Resp 16   Ht 1.727 m (5' 8\")   Wt 88.5 kg (195 lb)   SpO2 98%   BMI 29.65 kg/m    Body mass index is 29.65 kg/m .  Physical Exam   GENERAL: healthy, alert and no distress  MS: no gross musculoskeletal defects noted, L shoulder is non-tender with FROM, ~3 cm round rubbery mass " on the L upper arm, slightly tender to palpation, no surrounding redness, induration, or warmth, upper extremity sensation intact  SKIN: warm and dry     Diagnostic Test Results:  Labs reviewed in Epic        Assessment & Plan     1. Localized superficial swelling, mass, or lump  Suspect this is a lipoma.    - GENERAL SURG ADULT REFERRAL       Return in about 6 months (around 4/22/2020) for Physical Exam.    VERO [facility]

## 2019-10-24 ENCOUNTER — OFFICE VISIT (OUTPATIENT)
Dept: SURGERY | Facility: CLINIC | Age: 64
End: 2019-10-24
Payer: COMMERCIAL

## 2019-10-24 VITALS
SYSTOLIC BLOOD PRESSURE: 126 MMHG | DIASTOLIC BLOOD PRESSURE: 80 MMHG | HEART RATE: 72 BPM | RESPIRATION RATE: 16 BRPM | HEIGHT: 68 IN | BODY MASS INDEX: 29.55 KG/M2 | OXYGEN SATURATION: 98 % | WEIGHT: 195 LBS

## 2019-10-24 DIAGNOSIS — M25.512 LEFT SHOULDER PAIN, UNSPECIFIED CHRONICITY: ICD-10-CM

## 2019-10-24 DIAGNOSIS — R22.9 SUBCUTANEOUS MASS: Primary | ICD-10-CM

## 2019-10-24 PROCEDURE — 99213 OFFICE O/P EST LOW 20 MIN: CPT | Performed by: SURGERY

## 2019-10-24 ASSESSMENT — MIFFLIN-ST. JEOR: SCORE: 1649.01

## 2019-10-24 NOTE — PROGRESS NOTES
HPI:  Vladimir presents today for a subcutaneous mass on his left shoulder for the past 3 weeks. He denies trauma at to the site.  He has had  no drainage from the site in the past.  He has no history of  infection.  It is persistently painful.  It's size is slowly increasing.  His primary complaint however is decreased range of motion and some pain in his shoulder.    PE:  There were no vitals taken for this visit.  General appearance: well-nourished, no apparent distress  Lungs: respirations unlabored  Neurologic: nonfocal, grossly intact times four extremities, alert and oriented times three  Psychiatric: Mood and affect are appropriate  Skin: There is a 3 cm subcutaneous mass on the left shoulder at the distal deltoid.  The overlying skin is  normal.  It is mildly tender.      Impression: Likely subcutaneous lipoma left shoulder, left shoulder symptoms likely unrelated to this nodule         Plan:  We discussed excision at his convenience. Discussed incision/scar/recurrence, infection/seroma/hematoma.  We also discussed the option of MRI scanning to assure that this is benign fatty tissue in which case, he could then leave the nodule in place.  I also discussed the unlikelihood that removal of this mass would improve range of motion or relieve any of his shoulder symptoms.  I recommended that he consider consultation with an orthopedist.  If they were to need an MRI scan of his shoulder, he could also have this area scanned at the same time to assure its a benign lipoma.  He will consider these options.    Prosper Loving MD    Please route or send letter to:  Primary Care Provider (PCP) and Include Progress Note

## 2019-10-24 NOTE — LETTER
2019       Re: Vladimir Morse - 1955    Vladimir presents today for a subcutaneous mass on his left shoulder for the past 3 weeks. He denies trauma at to the site.  He has had  no drainage from the site in the past.  He has no history of  infection. It is persistently painful.  It's size is slowly increasing.  His primary complaint however is decreased range of motion and some pain in his shoulder.     PE:  There were no vitals taken for this visit.  General appearance: well-nourished, no apparent distress  Lungs: respirations unlabored  Neurologic: nonfocal, grossly intact times four extremities, alert and oriented times three  Psychiatric: Mood and affect are appropriate  Skin: There is a 3 cm subcutaneous mass on the left shoulder at the distal deltoid.  The overlying skin is  normal.  It is mildly tender.       Impression: Likely subcutaneous lipoma left shoulder, left shoulder symptoms likely unrelated to this nodule         Plan:  We discussed excision at his convenience. Discussed incision/scar/recurrence, infection/seroma/hematoma.  We also discussed the option of MRI scanning to assure that this is benign fatty tissue in which case, he could then leave the nodule in place.  I also discussed the unlikelihood that removal of this mass would improve range of motion or relieve any of his shoulder symptoms.  I recommended that he consider consultation with an orthopedist.  If they were to need an MRI scan of his shoulder, he could also have this area scanned at the same time to assure its a benign lipoma.  He will consider these options.     Prosper Loving MD

## 2019-12-16 DIAGNOSIS — I10 ESSENTIAL HYPERTENSION, BENIGN: ICD-10-CM

## 2019-12-17 RX ORDER — RAMIPRIL 5 MG/1
5 CAPSULE ORAL DAILY
Qty: 90 CAPSULE | Refills: 0 | Status: SHIPPED | OUTPATIENT
Start: 2019-12-17 | End: 2020-03-17

## 2019-12-17 NOTE — TELEPHONE ENCOUNTER
"Prescription approved per [facility] Refill Protocol.  Requested Prescriptions   Pending Prescriptions Disp Refills     ramipril (ALTACE) 5 MG capsule [Pharmacy Med Name: RAMIPRIL 5MG CAPS] 90 capsule 1     Sig: TAKE 1 CAPSULE (5 MG) BY MOUTH DAILY       ACE Inhibitors (Including Combos) Protocol Passed - 12/16/2019  4:20 PM        Passed - Blood pressure under 140/90 in past 12 months     BP Readings from Last 3 Encounters:   10/24/19 126/80   10/22/19 130/82   05/16/19 118/78                 Passed - Recent (12 mo) or future (30 days) visit within the authorizing provider's specialty     Patient has had an office visit with the authorizing provider or a provider within the authorizing providers department within the previous 12 mos or has a future within next 30 days. See \"Patient Info\" tab in inbasket, or \"Choose Columns\" in Meds & Orders section of the refill encounter.              Passed - Medication is active on med list        Passed - Patient is age 18 or older        Passed - Normal serum creatinine on file in past 12 months     Recent Labs   Lab Test 05/16/19  1018   CR 0.71             Passed - Normal serum potassium on file in past 12 months     Recent Labs   Lab Test 05/16/19  1018   POTASSIUM 4.5             Elysia Guadarrama RN on 12/17/2019 at 2:25 PM    "

## 2020-03-15 DIAGNOSIS — I10 ESSENTIAL HYPERTENSION, BENIGN: ICD-10-CM

## 2020-03-17 RX ORDER — RAMIPRIL 5 MG/1
CAPSULE ORAL
Qty: 90 CAPSULE | Refills: 1 | Status: SHIPPED | OUTPATIENT
Start: 2020-03-17 | End: 2020-08-31

## 2020-03-17 NOTE — TELEPHONE ENCOUNTER
"Prescription approved per [facility] Refill Protocol.  Erika Leyva RN  [facility]      Requested Prescriptions   Pending Prescriptions Disp Refills     ramipril (ALTACE) 5 MG capsule [Pharmacy Med Name: RAMIPRIL 5MG CAPS] 90 capsule 0     Sig: TAKE ONE CAPSULE BY MOUTH EVERY DAY       ACE Inhibitors (Including Combos) Protocol Passed - 3/15/2020  1:10 PM        Passed - Blood pressure under 140/90 in past 12 months     BP Readings from Last 3 Encounters:   10/24/19 126/80   10/22/19 130/82   05/16/19 118/78                 Passed - Recent (12 mo) or future (30 days) visit within the authorizing provider's specialty     Patient has had an office visit with the authorizing provider or a provider within the authorizing providers department within the previous 12 mos or has a future within next 30 days. See \"Patient Info\" tab in inbasket, or \"Choose Columns\" in Meds & Orders section of the refill encounter.              Passed - Medication is active on med list        Passed - Patient is age 18 or older        Passed - Normal serum creatinine on file in past 12 months     Recent Labs   Lab Test 05/16/19  1018   CR 0.71       Ok to refill medication if creatinine is low          Passed - Normal serum potassium on file in past 12 months     Recent Labs   Lab Test 05/16/19  1018   POTASSIUM 4.5                  "

## 2020-06-16 DIAGNOSIS — E78.5 HYPERLIPIDEMIA LDL GOAL <100: ICD-10-CM

## 2020-06-17 RX ORDER — ATORVASTATIN CALCIUM 40 MG/1
40 TABLET, FILM COATED ORAL DAILY
Qty: 90 TABLET | Refills: 0 | Status: SHIPPED | OUTPATIENT
Start: 2020-06-17 | End: 2020-08-31

## 2020-06-17 NOTE — TELEPHONE ENCOUNTER
Routing refill request to provider for review/approval because:  Labs not current:  ldl  Patient needs to be seen because it has been more than 1 year since last office visit.  Noah Roa RN, BSN

## 2020-08-21 PROBLEM — Z47.89 AFTERCARE FOLLOWING SURGERY OF THE MUSCULOSKELETAL SYSTEM: Status: ACTIVE | Noted: 2018-03-19

## 2020-08-21 PROBLEM — E66.811 OBESITY (BMI 30.0-34.9): Status: ACTIVE | Noted: 2018-03-05

## 2020-08-21 PROBLEM — M17.11 PRIMARY OSTEOARTHRITIS OF RIGHT KNEE: Status: ACTIVE | Noted: 2017-12-22

## 2020-08-21 PROBLEM — Z96.659 KNEE JOINT REPLACED BY OTHER MEANS: Status: ACTIVE | Noted: 2020-08-21

## 2020-08-21 NOTE — PROGRESS NOTES
Pre-Visit Planning     Future Appointments   Date Time Provider Department Center   8/31/2020  8:45 AM Reyes Hinkle MD [facility]     Arrival Time for this Appointment:  8:45 AM   Appointment Notes for this encounter:   Phone visit Med [facility] ph:[phone] pt declined video    Questionnaires Reviewed/Assigned  No additional questionnaires are needed      Patient preferred phone number: [phone]    Unable to reach. Left voicemail. Advised patient to call clinic back at [phone].

## 2020-08-28 ASSESSMENT — ANXIETY QUESTIONNAIRES
7. FEELING AFRAID AS IF SOMETHING AWFUL MIGHT HAPPEN: NOT AT ALL
IF YOU CHECKED OFF ANY PROBLEMS ON THIS QUESTIONNAIRE, HOW DIFFICULT HAVE THESE PROBLEMS MADE IT FOR YOU TO DO YOUR WORK, TAKE CARE OF THINGS AT HOME, OR GET ALONG WITH OTHER PEOPLE: NOT DIFFICULT AT ALL
5. BEING SO RESTLESS THAT IT IS HARD TO SIT STILL: NOT AT ALL
3. WORRYING TOO MUCH ABOUT DIFFERENT THINGS: NOT AT ALL
1. FEELING NERVOUS, ANXIOUS, OR ON EDGE: NOT AT ALL
GAD7 TOTAL SCORE: 0
2. NOT BEING ABLE TO STOP OR CONTROL WORRYING: NOT AT ALL
6. BECOMING EASILY ANNOYED OR IRRITABLE: NOT AT ALL

## 2020-08-28 ASSESSMENT — PATIENT HEALTH QUESTIONNAIRE - PHQ9
5. POOR APPETITE OR OVEREATING: NOT AT ALL
SUM OF ALL RESPONSES TO PHQ QUESTIONS 1-9: 0

## 2020-08-28 NOTE — PROGRESS NOTES
"Vladimir Morse is a 65 year old male who is being evaluated via a billable telephone visit.      The patient has been notified of following:     \"This telephone visit will be conducted via a call between you and your physician/provider. We have found that certain health care needs can be provided without the need for a physical exam.  This service lets us provide the care you need with a short phone conversation.  If a prescription is necessary we can send it directly to your pharmacy.  If lab work is needed we can place an order for that and you can then stop by our lab to have the test done at a later time.    Telephone visits are billed at different rates depending on your insurance coverage. During this emergency period, for some insurers they may be billed the same as an in-person visit.  Please reach out to your insurance provider with any questions.    If during the course of the call the physician/provider feels a telephone visit is not appropriate, you will not be charged for this service.\"    Patient has given verbal consent for Telephone visit?  Yes    What phone number would you like to be contacted at? [phone]    How would you like to obtain your AVS? Mail a copy    Subjective     Vladimir Morse is a 65 year old male who presents via phone visit today for the following health issues:    HPI    Hyperlipidemia Follow-Up      Are you regularly taking any medication or supplement to lower your cholesterol?   yes    Are you having muscle aches or other side effects that you think could be caused by your cholesterol lowering medication?  No    Hypertension Follow-up      Do you check your blood pressure regularly outside of the clinic? No     Are you following a low salt diet? No    Are your blood pressures ever more than 140 on the top number (systolic) OR more   than 90 on the bottom number (diastolic), for example 140/90? No    BP Readings from Last 2 Encounters:   10/24/19 126/80   10/22/19 130/82 "     Hemoglobin A1C (%)   Date Value   09/03/2013 5.3     LDL Cholesterol Calculated (mg/dL)   Date Value   05/16/2019 62   02/22/2018 61   143/88      How many servings of fruits and vegetables do you eat daily?  2-3    On average, how many sweetened beverages do you drink each day (Examples: soda, juice, sweet tea, etc.  Do NOT count diet or artificially sweetened beverages)?   0    How many days per week do you exercise enough to make your heart beat faster? 5    How many minutes a day do you exercise enough to make your heart beat faster? 60 or more    How many days per week do you miss taking your medication? 0  Past Medical History:   Diagnosis Date     BPPV (benign paroxysmal positional vertigo), unspecified laterality 7/3/2015     Cerebral aneurysm 8/4/2011    atherosclerotic     DIVERTICULITIS OF COLON W/O BLEED 6/29/2006     Elevated blood pressure reading without diagnosis of hypertension 7/22/2018     Essential hypertension, benign 2/22/2018     Gilbert's syndrome 12/13/2016     Hyperlipidemia LDL goal <100 11/28/2014     Knee pain 2/6/2009     Lipoma of skin and subcutaneous tissue 6/22/2012     Nephrolithiasis 6/22/2012     Pain in joint, shoulder region 9/18/2010     Presbycusis 6/22/2012     SBO (small bowel obstruction) (H) 7/27/2012     Subungual hematoma of right foot, initial encounter 7/22/2018     TIA (transient ischaemic attack)      Unspecified intestinal obstruction     diverticulitis     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 Freedman [facility]     CYSTOSCOPY, RETROGRADES, INSERT STENT URETER(S), COMBINED  6/29/2012    Procedure: COMBINED CYSTOSCOPY, RETROGRADES, INSERT STENT  URETER(S);  LEFT URETEROSCOPY, LEFT URETERAL STENT PLACEMENT;  Surgeon: Timothy Ortgea MD;  Location: [facility] OR     EXCISE LESION BACK Left 9/21/2018    Procedure: EXCISE LESION BACK;  Excision subcutaneous mass and skin lesion left back;  Surgeon: Prosper Loving MD;  Location:  OR     EXTRACORPOREAL SHOCK WAVE LITHOTRIPSY (ESWL)  12/20/2012    Procedure: EXTRACORPOREAL SHOCK WAVE LITHOTRIPSY (ESWL);  LEFT EXTRACORPOREAL SHOCKWAVE LITHOTRIPSY ;  Surgeon: Timothy Ortega MD;  Location:  OR     ORTHOPEDIC SURGERY      knee cyst removed     TONSILLECTOMY      T&A as a child       Family History   Problem Relation Age of Onset     Breast Cancer Mother      Cancer - colorectal Father      Colon Cancer Father      Breast Cancer Sister        Social History     Tobacco Use     Smoking status: Never Smoker     Smokeless tobacco: Never Used   Substance Use Topics     Alcohol use: No     Comment: rarely                Review of Systems   Great mood no systemic symptoms weight may be increasing       Objective   Vitals - Patient Reported  Systolic (Patient Reported): (!) 143  Diastolic (Patient Reported): 88      Vitals:  No vitals were obtained today due to virtual visit.    healthy, alert and no distress  PSYCH: Alert and oriented times 3; coherent speech, normal   rate and volume, able to articulate logical thoughts, able   to abstract reason, no tangential thoughts, no hallucinations   or delusions  His affect is normal  RESP: No cough, no audible wheezing, able to talk in full sentences  Remainder of exam unable to be completed due to telephone visits            Assessment/Plan:    Assessment & Plan   Problem List Items Addressed This Visit        Endocrine    Hyperlipidemia - Primary     Statin therapy.  Continue         Relevant Medications    atorvastatin (LIPITOR) 40 MG tablet       Circulatory    Essential hypertension, benign     Borderline control per patient report.  Maximize ramipril         Relevant  "Medications    ramipril (ALTACE) 10 MG capsule    Other Relevant Orders    COMPREHENSIVE METABOLIC PANEL             BMI:   Estimated body mass index is 29.65 kg/m  as calculated from the following:    Height as of 10/24/19: 1.727 m (5' 8\").    Weight as of 10/24/19: 88.5 kg (195 lb).   Weight management plan: Maintain            Return in about 6 weeks (around 10/12/2020) for BP Recheck, immunizations, Lab Work.    Reyes Hinkle MD  [facility]    Phone call duration:  14 minutes              "

## 2020-08-29 ASSESSMENT — ANXIETY QUESTIONNAIRES: GAD7 TOTAL SCORE: 0

## 2020-08-31 ENCOUNTER — VIRTUAL VISIT (OUTPATIENT)
Dept: FAMILY MEDICINE | Facility: CLINIC | Age: 65
End: 2020-08-31
Payer: COMMERCIAL

## 2020-08-31 DIAGNOSIS — E78.00 PURE HYPERCHOLESTEROLEMIA: Primary | ICD-10-CM

## 2020-08-31 DIAGNOSIS — I10 ESSENTIAL HYPERTENSION, BENIGN: ICD-10-CM

## 2020-08-31 PROBLEM — R03.0 ELEVATED BLOOD PRESSURE READING WITHOUT DIAGNOSIS OF HYPERTENSION: Status: RESOLVED | Noted: 2018-07-22 | Resolved: 2020-08-31

## 2020-08-31 PROBLEM — S90.221A: Status: RESOLVED | Noted: 2018-07-22 | Resolved: 2020-08-31

## 2020-08-31 PROCEDURE — 99213 OFFICE O/P EST LOW 20 MIN: CPT | Mod: TEL | Performed by: FAMILY MEDICINE

## 2020-08-31 RX ORDER — RAMIPRIL 10 MG/1
10 CAPSULE ORAL DAILY
Qty: 90 CAPSULE | Refills: 1 | Status: SHIPPED | OUTPATIENT
Start: 2020-08-31 | End: 2021-03-05

## 2020-08-31 RX ORDER — ATORVASTATIN CALCIUM 40 MG/1
40 TABLET, FILM COATED ORAL DAILY
Qty: 90 TABLET | Refills: 3 | Status: SHIPPED | OUTPATIENT
Start: 2020-08-31 | End: 2020-12-07

## 2020-10-05 ENCOUNTER — ALLIED HEALTH/NURSE VISIT (OUTPATIENT)
Dept: FAMILY MEDICINE | Facility: CLINIC | Age: 65
End: 2020-10-05
Payer: COMMERCIAL

## 2020-10-05 DIAGNOSIS — Z23 NEED FOR PNEUMOCOCCAL VACCINATION: ICD-10-CM

## 2020-10-05 DIAGNOSIS — Z23 NEED FOR PROPHYLACTIC VACCINATION AND INOCULATION AGAINST INFLUENZA: Primary | ICD-10-CM

## 2020-10-05 DIAGNOSIS — Z23 NEED FOR SHINGLES VACCINE: ICD-10-CM

## 2020-10-05 LAB
CHOLEST SERPL-MCNC: 113 MG/DL
HDLC SERPL-MCNC: 53 MG/DL
LDLC SERPL CALC-MCNC: 46 MG/DL
NONHDLC SERPL-MCNC: 60 MG/DL
TRIGL SERPL-MCNC: 70 MG/DL

## 2020-10-05 PROCEDURE — 36415 COLL VENOUS BLD VENIPUNCTURE: CPT | Performed by: FAMILY MEDICINE

## 2020-10-05 PROCEDURE — 80061 LIPID PANEL: CPT | Performed by: FAMILY MEDICINE

## 2020-10-05 PROCEDURE — 90472 IMMUNIZATION ADMIN EACH ADD: CPT

## 2020-10-05 PROCEDURE — 90662 IIV NO PRSV INCREASED AG IM: CPT

## 2020-10-05 PROCEDURE — 90471 IMMUNIZATION ADMIN: CPT

## 2020-10-05 PROCEDURE — 99207 PR NO CHARGE NURSE ONLY: CPT

## 2020-10-05 PROCEDURE — 90732 PPSV23 VACC 2 YRS+ SUBQ/IM: CPT

## 2020-10-05 PROCEDURE — 90750 HZV VACC RECOMBINANT IM: CPT

## 2020-10-05 NOTE — LETTER
[facility] LABORATORY  [address] [facility] [address]  [phone]          October 6, 2020    Vladimir Morse                                                                                                  [facility] [address] [facility] [address]            Dear Vladimir,      Cholesterol is a little better      Sincerely,       Reyes Hinkle MD/simin

## 2020-11-16 ENCOUNTER — TRANSFERRED RECORDS (OUTPATIENT)
Dept: HEALTH INFORMATION MANAGEMENT | Facility: CLINIC | Age: 65
End: 2020-11-16

## 2020-11-16 ENCOUNTER — TELEPHONE (OUTPATIENT)
Dept: FAMILY MEDICINE | Facility: CLINIC | Age: 65
End: 2020-11-16

## 2020-11-16 DIAGNOSIS — E78.5 HYPERLIPIDEMIA LDL GOAL <100: Primary | ICD-10-CM

## 2020-11-16 NOTE — TELEPHONE ENCOUNTER
General Call:     Who is calling:  Patient    Reason for Call:  Patient called and stated had an appt with  at [facility] which  is suggesting a change of the Atorvastatin medication dosage. Please call patient at [phone].    Date of last appointment with provider: 8/31/2020    Okay to leave a detailed message:Yes at Cell number on file:    Telephone Information:   Mobile [phone]

## 2020-11-16 NOTE — TELEPHONE ENCOUNTER
Called patient and said at appt this am with neurologist thinks should reduce his atorvastatin to 20 mg daily. Calling the neurology office to get more clarification.    Dr Martins contact number [phone] [facility] in [place].     LMTRC to neurology clinic, told to ask for any triage nurse.    Jaylene HERNANDEZ RN, BSN

## 2020-11-20 RX ORDER — ATORVASTATIN CALCIUM 20 MG/1
20 TABLET, FILM COATED ORAL DAILY
Qty: 90 TABLET | Refills: 3 | Status: CANCELLED | OUTPATIENT
Start: 2020-11-20

## 2020-11-20 NOTE — TELEPHONE ENCOUNTER
Gianluca, calling back from [facility].  Dr. Michael did note he would like Atorvastatin 40 mg decreased to Atorvastatin 20 mg.  Gianluca is faxing office note over.  Please advise.  Angelica Vera RN

## 2020-11-23 NOTE — TELEPHONE ENCOUNTER
Spoke to pt who states he will not take atorvastatin until he hears back from us.  Bernice Rader RN

## 2020-11-30 DIAGNOSIS — G45.0 VERTEBROBASILAR CIRCULATION TRANSIENT ISCHEMIC ATTACK: ICD-10-CM

## 2020-12-03 ENCOUNTER — VIRTUAL VISIT (OUTPATIENT)
Dept: FAMILY MEDICINE | Facility: OTHER | Age: 65
End: 2020-12-03
Payer: COMMERCIAL

## 2020-12-03 DIAGNOSIS — Z20.822 SUSPECTED COVID-19 VIRUS INFECTION: Primary | ICD-10-CM

## 2020-12-03 DIAGNOSIS — Z20.822 SUSPECTED COVID-19 VIRUS INFECTION: ICD-10-CM

## 2020-12-03 PROCEDURE — 99421 OL DIG E/M SVC 5-10 MIN: CPT | Performed by: FAMILY MEDICINE

## 2020-12-03 PROCEDURE — U0003 INFECTIOUS AGENT DETECTION BY NUCLEIC ACID (DNA OR RNA); SEVERE ACUTE RESPIRATORY SYNDROME CORONAVIRUS 2 (SARS-COV-2) (CORONAVIRUS DISEASE [COVID-19]), AMPLIFIED PROBE TECHNIQUE, MAKING USE OF HIGH THROUGHPUT TECHNOLOGIES AS DESCRIBED BY CMS-2020-01-R: HCPCS | Performed by: FAMILY MEDICINE

## 2020-12-03 NOTE — PROGRESS NOTES
"Date: 2020 11:25:41  Clinician: Ignacio Lundberg  Clinician NPI: [id number]  Patient: Vladimir Morse  Patient : 1955  Patient Address: [address] [place] [address] [address] [place] [id number]  Patient Phone: [phone]  Visit Protocol: URI  Patient Summary:  Vladimir is a 65 year old ( : 1955 ) male who initiated a [facility] Visit for COVID-19 (Coronavirus) evaluation and screening. When asked the question \"Please sign me up to receive news, health information and promotions from [facility].\", Vladimir responded \"No\".    When asked when his symptoms started, Vladimir reported that he does not have any symptoms.   He denies taking antibiotic medication in the past month and having recent facial or sinus surgery in the past 60 days.    Pertinent COVID-19 (Coronavirus) information  Vladimir does not work or volunteer as healthcare worker or a . In the past 14 days, Vladimir has not worked or volunteered at a healthcare facility or group living setting.   In the past 14 days, he also has not lived in a congregate living setting.   Vladimir has not had a close contact with a laboratory-confirmed COVID-19 patient within the last 14 days.    Since 2019, Vladimir has not been tested for COVID-19 and has not had upper respiratory infection or influenza-like illness.   Pertinent medical history  He has not been told by his provider to avoid NSAIDs.   Vladimir does not have diabetes. He denies having immunosuppressive conditions (e.g., chemotherapy, HIV, organ transplant, long-term use of steroids or other immunosuppressive medications, splenectomy). He does not have severe COPD and congestive heart failure. He does not have asthma.   Vladimir needs a return to work/school note.   Weight: 185 lbs   Vladimir does not smoke or use smokeless tobacco.   Additional information as reported by the patient (free text): My spouse currently has flu like symptoms and Covid testing has been recommended   Weight: 185 lbs    MEDICATIONS: " Aspirin Low Dose oral, ramipril oral, atorvastatin oral, ALLERGIES: NKDA  Clinician Response:  Dear Vladimir,   Based on your exposure to COVID-19 (coronavirus), we would like to test you for this virus.  1. Please call [phone] to schedule your visit. Explain that you were referred by [facility] to have a COVID-19 test. Be ready to share [facility] number.  * If you need to schedule in [facility] please call [phone] or for [employer] employees please call [phone].   * If you need to schedule in the [place] area please call [phone]. [employer] employees call [phone].   The following will serve as your written order for this COVID Test, ordered by me, for the indication of suspected COVID [Z20.828]: The test will be ordered in [employer], our electronic health record, after you are scheduled. It will show as ordered and authorized by Hernán Rodriguez MD.  Order: COVID-19 (coronavirus) PCR for ASYMPTOMATIC EXPOSURE testing from [facility].   If you know you have had close contact with someone who tested positive, you should be quarantined for 14 days after this exposure. You should stay in quarantine for the14 days even if the covid test is negative, the optimal time to test after exposure is 5-7 days from the exposure  Quarantine means   What should I do?  For safety, it's very important to follow these rules. Do this for 14 days after the date you were last exposed to the virus..  Stay home and away from others. Don't go to school or anywhere else. Generally quarantine means staying home from work but there are some exceptions to this. Please contact your workplace.   No hugging, kissing or shaking hands.  Don't let anyone visit.  Cover your mouth and nose with a mask, tissue or washcloth to avoid spreading germs.  Wash your hands and face often. Use soap and water.  What are the symptoms of COVID-19?  The most common symptoms are cough, fever and trouble breathing. Less common symptoms include headache,  body aches, fatigue (feeling very tired), chills, sore throat, stuffy or runny nose, diarrhea (loose poop), loss of taste or smell, belly pain, and nausea or vomiting (feeling sick to your stomach or throwing up).  After 14 days, if you have still don't have symptoms, you likely don't have this virus.  If you develop symptoms, follow these guidelines.  If you're normally healthy: Please start another [facility] visit to report your symptoms. Go to [email or web address].  If you have a serious health problem (like cancer, heart failure, an organ transplant or kidney disease): Call your specialty clinic. Let them know that you might have COVID-19.  2. When it's time for your COVID test:  Stay at least 6 feet away from others. (If someone will drive you to your test, stay in the backseat, as far away from the  as you can.)  Cover your mouth and nose with a mask, tissue or washcloth.  Go straight to the testing site. Don't make any stops on the way there or back.  Please note  Caregivers in these groups are at risk for severe illness due to COVID-19:  o People 65 years and older  o People who live in a nursing home or long-term care facility  o People with chronic disease (lung, heart, cancer, diabetes, kidney, liver, immunologic)  o People who have a weakened immune system, including those who:  Are in cancer treatment  Take medicine that weakens the immune system, such as corticosteroids  Had a bone marrow or organ transplant  Have an immune deficiency  Have poorly controlled HIV or AIDS  Are obese (body mass index of 40 or higher)  Smoke regularly  Where can I get more information?   [email or web address] [place] -- About COVID-19: [email or web address]  CDC -- What to Do If You're Sick: www.cdc.gov/coronavirus/2019-ncov/about/steps-when-sick.html  CDC -- Ending Home Isolation: www.cdc.gov/coronavirus/2019-ncov/hcp/disposition-in-home-patients.html  CDC -- Caring for Someone:  www.cdc.gov/coronavirus/2019-ncov/if-you-are-sick/care-for-someone.html  [facility] -- Interim Guidance for Hospital Discharge to Home: www.health.[facility].[place]/diseases/coronavirus/hcp/hospdischarge.pdf  [facility] clinical trials (COVID-19 research studies): clinicalaffairs.[facility]-clinical-trials  Below are the COVID-19 hotlines at the [place] Department of Health ([facility]). Interpreters are available.  For health questions: Call 822-054-7995 or 1-668.747.9165 (7 a.m. to 7 p.m.)  For questions about schools and childcare: Call 601-654-9552 or 1-102.854.4189 (7 a.m. to 7 p.m.)    Diagnosis: Contact with and (suspected) exposure to other viral communicable diseases  Diagnosis ICD: Z20.828

## 2020-12-04 LAB
SARS-COV-2 RNA SPEC QL NAA+PROBE: NOT DETECTED
SPECIMEN SOURCE: NORMAL

## 2020-12-04 NOTE — TELEPHONE ENCOUNTER
Wife called to check on status of message. Do not see any note from neurology scanned in chart. Advised wife that Gianluca had noted that MD note did not mention the decrease in atorvastatin in his note. Wife states will call over to their office to get a message to Dr Maravilla to advise again on medication. Will call back when hears back.    Jaylene HERNANDEZ RN, BSN

## 2020-12-08 ENCOUNTER — TELEPHONE (OUTPATIENT)
Dept: FAMILY MEDICINE | Facility: CLINIC | Age: 65
End: 2020-12-08

## 2020-12-08 NOTE — TELEPHONE ENCOUNTER
Informed Pt that rx had been sent into [facility] by Dr. Arin Lopez  A-EMT  Clinic Health Guide, SB 4 PAL

## 2021-01-15 ENCOUNTER — HEALTH MAINTENANCE LETTER (OUTPATIENT)
Age: 66
End: 2021-01-15

## 2021-03-03 DIAGNOSIS — I10 ESSENTIAL HYPERTENSION, BENIGN: ICD-10-CM

## 2021-03-05 RX ORDER — RAMIPRIL 10 MG/1
CAPSULE ORAL
Qty: 90 CAPSULE | Refills: 0 | Status: SHIPPED | OUTPATIENT
Start: 2021-03-05 | End: 2021-06-03

## 2021-03-05 NOTE — TELEPHONE ENCOUNTER
3 month zenon refill approved.     Routing refill request to provider for review/approval because:  Labs not current:  CR, ELLIOT  Outdated BP    Wendy Menezes RN on 3/5/2021 at 9:31 AM

## 2021-05-24 ENCOUNTER — APPOINTMENT (OUTPATIENT)
Dept: CT IMAGING | Facility: CLINIC | Age: 66
DRG: 390 | End: 2021-05-24
Attending: EMERGENCY MEDICINE
Payer: COMMERCIAL

## 2021-05-24 ENCOUNTER — OFFICE VISIT (OUTPATIENT)
Dept: FAMILY MEDICINE | Facility: CLINIC | Age: 66
End: 2021-05-24
Payer: COMMERCIAL

## 2021-05-24 ENCOUNTER — HOSPITAL ENCOUNTER (INPATIENT)
Facility: CLINIC | Age: 66
LOS: 4 days | Discharge: HOME | DRG: 390 | End: 2021-05-28
Attending: EMERGENCY MEDICINE | Admitting: INTERNAL MEDICINE
Payer: COMMERCIAL

## 2021-05-24 ENCOUNTER — ANCILLARY PROCEDURE (OUTPATIENT)
Dept: GENERAL RADIOLOGY | Facility: CLINIC | Age: 66
End: 2021-05-24
Attending: NURSE PRACTITIONER
Payer: COMMERCIAL

## 2021-05-24 VITALS
WEIGHT: 178 LBS | TEMPERATURE: 98.4 F | DIASTOLIC BLOOD PRESSURE: 78 MMHG | HEIGHT: 68 IN | RESPIRATION RATE: 16 BRPM | HEART RATE: 78 BPM | SYSTOLIC BLOOD PRESSURE: 132 MMHG | BODY MASS INDEX: 26.98 KG/M2

## 2021-05-24 DIAGNOSIS — R10.32 LLQ ABDOMINAL PAIN: Primary | ICD-10-CM

## 2021-05-24 DIAGNOSIS — Z87.19 HISTORY OF INTESTINAL OBSTRUCTION: ICD-10-CM

## 2021-05-24 DIAGNOSIS — K56.609 SMALL BOWEL OBSTRUCTION (H): ICD-10-CM

## 2021-05-24 DIAGNOSIS — Z87.19 HISTORY OF DIVERTICULITIS: ICD-10-CM

## 2021-05-24 LAB
ALBUMIN SERPL-MCNC: 3.7 G/DL (ref 3.4–5)
ALBUMIN SERPL-MCNC: 4 G/DL (ref 3.4–5)
ALP SERPL-CCNC: 111 U/L (ref 40–150)
ALP SERPL-CCNC: 114 U/L (ref 40–150)
ALT SERPL W P-5'-P-CCNC: 39 U/L (ref 0–70)
ALT SERPL W P-5'-P-CCNC: 39 U/L (ref 0–70)
ANION GAP SERPL CALCULATED.3IONS-SCNC: 4 MMOL/L (ref 3–14)
ANION GAP SERPL CALCULATED.3IONS-SCNC: 6 MMOL/L (ref 3–14)
AST SERPL W P-5'-P-CCNC: 20 U/L (ref 0–45)
AST SERPL W P-5'-P-CCNC: 32 U/L (ref 0–45)
BASOPHILS # BLD AUTO: 0 10E9/L (ref 0–0.2)
BASOPHILS NFR BLD AUTO: 0.4 %
BILIRUB SERPL-MCNC: 3.8 MG/DL (ref 0.2–1.3)
BILIRUB SERPL-MCNC: 4 MG/DL (ref 0.2–1.3)
BUN SERPL-MCNC: 15 MG/DL (ref 7–30)
BUN SERPL-MCNC: 15 MG/DL (ref 7–30)
CALCIUM SERPL-MCNC: 8.8 MG/DL (ref 8.5–10.1)
CALCIUM SERPL-MCNC: 9.3 MG/DL (ref 8.5–10.1)
CHLORIDE SERPL-SCNC: 106 MMOL/L (ref 94–109)
CHLORIDE SERPL-SCNC: 107 MMOL/L (ref 94–109)
CO2 SERPL-SCNC: 26 MMOL/L (ref 20–32)
CO2 SERPL-SCNC: 29 MMOL/L (ref 20–32)
CREAT BLD-MCNC: 0.7 MG/DL (ref 0.66–1.25)
CREAT SERPL-MCNC: 0.72 MG/DL (ref 0.66–1.25)
CREAT SERPL-MCNC: 0.76 MG/DL (ref 0.66–1.25)
DIFFERENTIAL METHOD BLD: NORMAL
EOSINOPHIL # BLD AUTO: 0.1 10E9/L (ref 0–0.7)
EOSINOPHIL NFR BLD AUTO: 1 %
ERYTHROCYTE [DISTWIDTH] IN BLOOD BY AUTOMATED COUNT: 14.1 % (ref 10–15)
ERYTHROCYTE [SEDIMENTATION RATE] IN BLOOD BY WESTERGREN METHOD: 5 MM/H (ref 0–20)
GFR SERPL CREATININE-BSD FRML MDRD: >90 ML/MIN/{1.73_M2}
GLUCOSE SERPL-MCNC: 72 MG/DL (ref 70–99)
GLUCOSE SERPL-MCNC: 86 MG/DL (ref 70–99)
HCT VFR BLD AUTO: 51 % (ref 40–53)
HGB BLD-MCNC: 16.5 G/DL (ref 13.3–17.7)
LABORATORY COMMENT REPORT: NORMAL
LACTATE BLD-SCNC: 1.1 MMOL/L (ref 0.7–2)
LYMPHOCYTES # BLD AUTO: 1.9 10E9/L (ref 0.8–5.3)
LYMPHOCYTES NFR BLD AUTO: 22.6 %
MCH RBC QN AUTO: 29.4 PG (ref 26.5–33)
MCHC RBC AUTO-ENTMCNC: 32.4 G/DL (ref 31.5–36.5)
MCV RBC AUTO: 91 FL (ref 78–100)
MONOCYTES # BLD AUTO: 0.6 10E9/L (ref 0–1.3)
MONOCYTES NFR BLD AUTO: 6.7 %
NEUTROPHILS # BLD AUTO: 5.7 10E9/L (ref 1.6–8.3)
NEUTROPHILS NFR BLD AUTO: 69.3 %
PLATELET # BLD AUTO: 241 10E9/L (ref 150–450)
POTASSIUM SERPL-SCNC: 3.8 MMOL/L (ref 3.4–5.3)
POTASSIUM SERPL-SCNC: 4.1 MMOL/L (ref 3.4–5.3)
POTASSIUM SERPL-SCNC: 4.2 MMOL/L (ref 3.4–5.3)
PROT SERPL-MCNC: 7.5 G/DL (ref 6.8–8.8)
PROT SERPL-MCNC: 7.5 G/DL (ref 6.8–8.8)
RBC # BLD AUTO: 5.62 10E12/L (ref 4.4–5.9)
SARS-COV-2 RNA RESP QL NAA+PROBE: NEGATIVE
SODIUM SERPL-SCNC: 139 MMOL/L (ref 133–144)
SODIUM SERPL-SCNC: 139 MMOL/L (ref 133–144)
SPECIMEN SOURCE: NORMAL
WBC # BLD AUTO: 8.2 10E9/L (ref 4–11)

## 2021-05-24 PROCEDURE — 96376 TX/PRO/DX INJ SAME DRUG ADON: CPT

## 2021-05-24 PROCEDURE — 99207 PR INPT ADMISSION FROM CLINIC: CPT | Performed by: NURSE PRACTITIONER

## 2021-05-24 PROCEDURE — 120N000001 HC R&B MED SURG/OB

## 2021-05-24 PROCEDURE — 250N000009 HC RX 250: Performed by: INTERNAL MEDICINE

## 2021-05-24 PROCEDURE — 96375 TX/PRO/DX INJ NEW DRUG ADDON: CPT

## 2021-05-24 PROCEDURE — 250N000011 HC RX IP 250 OP 636: Performed by: EMERGENCY MEDICINE

## 2021-05-24 PROCEDURE — 82565 ASSAY OF CREATININE: CPT

## 2021-05-24 PROCEDURE — 74177 CT ABD & PELVIS W/CONTRAST: CPT

## 2021-05-24 PROCEDURE — 84132 ASSAY OF SERUM POTASSIUM: CPT | Performed by: INTERNAL MEDICINE

## 2021-05-24 PROCEDURE — 87635 SARS-COV-2 COVID-19 AMP PRB: CPT | Performed by: EMERGENCY MEDICINE

## 2021-05-24 PROCEDURE — 80053 COMPREHEN METABOLIC PANEL: CPT | Performed by: EMERGENCY MEDICINE

## 2021-05-24 PROCEDURE — 96361 HYDRATE IV INFUSION ADD-ON: CPT

## 2021-05-24 PROCEDURE — 83605 ASSAY OF LACTIC ACID: CPT | Performed by: EMERGENCY MEDICINE

## 2021-05-24 PROCEDURE — 250N000009 HC RX 250: Performed by: EMERGENCY MEDICINE

## 2021-05-24 PROCEDURE — 85025 COMPLETE CBC W/AUTO DIFF WBC: CPT | Performed by: NURSE PRACTITIONER

## 2021-05-24 PROCEDURE — C9803 HOPD COVID-19 SPEC COLLECT: HCPCS

## 2021-05-24 PROCEDURE — 74019 RADEX ABDOMEN 2 VIEWS: CPT | Mod: TC | Performed by: RADIOLOGY

## 2021-05-24 PROCEDURE — 99223 1ST HOSP IP/OBS HIGH 75: CPT | Mod: AI | Performed by: INTERNAL MEDICINE

## 2021-05-24 PROCEDURE — 80053 COMPREHEN METABOLIC PANEL: CPT | Performed by: NURSE PRACTITIONER

## 2021-05-24 PROCEDURE — 85652 RBC SED RATE AUTOMATED: CPT | Performed by: NURSE PRACTITIONER

## 2021-05-24 PROCEDURE — 36415 COLL VENOUS BLD VENIPUNCTURE: CPT | Performed by: NURSE PRACTITIONER

## 2021-05-24 PROCEDURE — 96374 THER/PROPH/DIAG INJ IV PUSH: CPT

## 2021-05-24 PROCEDURE — 36415 COLL VENOUS BLD VENIPUNCTURE: CPT | Performed by: INTERNAL MEDICINE

## 2021-05-24 PROCEDURE — 250N000011 HC RX IP 250 OP 636: Performed by: INTERNAL MEDICINE

## 2021-05-24 PROCEDURE — 258N000003 HC RX IP 258 OP 636: Performed by: INTERNAL MEDICINE

## 2021-05-24 PROCEDURE — 99285 EMERGENCY DEPT VISIT HI MDM: CPT | Mod: 25

## 2021-05-24 PROCEDURE — 258N000003 HC RX IP 258 OP 636: Performed by: EMERGENCY MEDICINE

## 2021-05-24 RX ORDER — NALOXONE HYDROCHLORIDE 0.4 MG/ML
0.4 INJECTION, SOLUTION INTRAMUSCULAR; INTRAVENOUS; SUBCUTANEOUS
Status: DISCONTINUED | OUTPATIENT
Start: 2021-05-24 | End: 2021-05-28 | Stop reason: HOSPADM

## 2021-05-24 RX ORDER — IBUPROFEN 200 MG
200 TABLET ORAL PRN
COMMUNITY
End: 2021-12-08

## 2021-05-24 RX ORDER — DEXTROSE MONOHYDRATE, SODIUM CHLORIDE, AND POTASSIUM CHLORIDE 50; 1.49; 4.5 G/1000ML; G/1000ML; G/1000ML
INJECTION, SOLUTION INTRAVENOUS CONTINUOUS
Status: DISCONTINUED | OUTPATIENT
Start: 2021-05-24 | End: 2021-05-27

## 2021-05-24 RX ORDER — IOPAMIDOL 755 MG/ML
500 INJECTION, SOLUTION INTRAVASCULAR ONCE
Status: COMPLETED | OUTPATIENT
Start: 2021-05-24 | End: 2021-05-24

## 2021-05-24 RX ORDER — ONDANSETRON 4 MG/1
4 TABLET, ORALLY DISINTEGRATING ORAL EVERY 6 HOURS PRN
Status: DISCONTINUED | OUTPATIENT
Start: 2021-05-24 | End: 2021-05-28 | Stop reason: HOSPADM

## 2021-05-24 RX ORDER — OXYCODONE HYDROCHLORIDE 5 MG/1
5-10 TABLET ORAL
Status: DISCONTINUED | OUTPATIENT
Start: 2021-05-24 | End: 2021-05-28 | Stop reason: HOSPADM

## 2021-05-24 RX ORDER — PROCHLORPERAZINE MALEATE 5 MG
5 TABLET ORAL EVERY 6 HOURS PRN
Status: DISCONTINUED | OUTPATIENT
Start: 2021-05-24 | End: 2021-05-28 | Stop reason: HOSPADM

## 2021-05-24 RX ORDER — ONDANSETRON 2 MG/ML
4 INJECTION INTRAMUSCULAR; INTRAVENOUS EVERY 30 MIN PRN
Status: DISCONTINUED | OUTPATIENT
Start: 2021-05-24 | End: 2021-05-24

## 2021-05-24 RX ORDER — NALOXONE HYDROCHLORIDE 0.4 MG/ML
0.2 INJECTION, SOLUTION INTRAMUSCULAR; INTRAVENOUS; SUBCUTANEOUS
Status: DISCONTINUED | OUTPATIENT
Start: 2021-05-24 | End: 2021-05-28 | Stop reason: HOSPADM

## 2021-05-24 RX ORDER — HYDRALAZINE HYDROCHLORIDE 20 MG/ML
10 INJECTION INTRAMUSCULAR; INTRAVENOUS EVERY 6 HOURS PRN
Status: DISCONTINUED | OUTPATIENT
Start: 2021-05-24 | End: 2021-05-28 | Stop reason: HOSPADM

## 2021-05-24 RX ORDER — MORPHINE SULFATE 4 MG/ML
4 INJECTION, SOLUTION INTRAMUSCULAR; INTRAVENOUS
Status: DISCONTINUED | OUTPATIENT
Start: 2021-05-24 | End: 2021-05-24

## 2021-05-24 RX ORDER — HYDROMORPHONE HCL IN WATER/PF 6 MG/30 ML
0.2 PATIENT CONTROLLED ANALGESIA SYRINGE INTRAVENOUS
Status: DISCONTINUED | OUTPATIENT
Start: 2021-05-24 | End: 2021-05-28 | Stop reason: HOSPADM

## 2021-05-24 RX ORDER — ONDANSETRON 2 MG/ML
4 INJECTION INTRAMUSCULAR; INTRAVENOUS EVERY 6 HOURS PRN
Status: DISCONTINUED | OUTPATIENT
Start: 2021-05-24 | End: 2021-05-28 | Stop reason: HOSPADM

## 2021-05-24 RX ORDER — LIDOCAINE 40 MG/G
CREAM TOPICAL
Status: DISCONTINUED | OUTPATIENT
Start: 2021-05-24 | End: 2021-05-28 | Stop reason: HOSPADM

## 2021-05-24 RX ORDER — PROCHLORPERAZINE 25 MG
12.5 SUPPOSITORY, RECTAL RECTAL EVERY 12 HOURS PRN
Status: DISCONTINUED | OUTPATIENT
Start: 2021-05-24 | End: 2021-05-28 | Stop reason: HOSPADM

## 2021-05-24 RX ADMIN — HYDROMORPHONE HYDROCHLORIDE 0.2 MG: 0.2 INJECTION, SOLUTION INTRAMUSCULAR; INTRAVENOUS; SUBCUTANEOUS at 17:23

## 2021-05-24 RX ADMIN — ONDANSETRON 4 MG: 2 INJECTION INTRAMUSCULAR; INTRAVENOUS at 17:07

## 2021-05-24 RX ADMIN — SODIUM CHLORIDE 62 ML: 9 INJECTION, SOLUTION INTRAVENOUS at 14:26

## 2021-05-24 RX ADMIN — POTASSIUM CHLORIDE, DEXTROSE MONOHYDRATE AND SODIUM CHLORIDE: 150; 5; 450 INJECTION, SOLUTION INTRAVENOUS at 17:08

## 2021-05-24 RX ADMIN — MORPHINE SULFATE 4 MG: 4 INJECTION INTRAVENOUS at 15:11

## 2021-05-24 RX ADMIN — ONDANSETRON 4 MG: 2 INJECTION INTRAMUSCULAR; INTRAVENOUS at 15:11

## 2021-05-24 RX ADMIN — MORPHINE SULFATE 4 MG: 4 INJECTION INTRAVENOUS at 13:09

## 2021-05-24 RX ADMIN — ONDANSETRON 4 MG: 2 INJECTION INTRAMUSCULAR; INTRAVENOUS at 13:09

## 2021-05-24 RX ADMIN — FAMOTIDINE 20 MG: 10 INJECTION, SOLUTION INTRAVENOUS at 21:20

## 2021-05-24 RX ADMIN — SODIUM CHLORIDE 1000 ML: 9 INJECTION, SOLUTION INTRAVENOUS at 13:08

## 2021-05-24 RX ADMIN — IOPAMIDOL 89 ML: 755 INJECTION, SOLUTION INTRAVENOUS at 14:26

## 2021-05-24 ASSESSMENT — ENCOUNTER SYMPTOMS
DYSURIA: 0
DIARRHEA: 1
SHORTNESS OF BREATH: 0
ABDOMINAL PAIN: 1
FREQUENCY: 0
NAUSEA: 1
VOMITING: 1
BACK PAIN: 0

## 2021-05-24 ASSESSMENT — MIFFLIN-ST. JEOR
SCORE: 1566.9
SCORE: 1571.44

## 2021-05-24 ASSESSMENT — ACTIVITIES OF DAILY LIVING (ADL): ADLS_ACUITY_SCORE: 14

## 2021-05-24 NOTE — ED NOTES
[facility]  ED Nurse Handoff Report    Vladimir Morse is a 65 year old male   ED Chief complaint: Small Bowel Obstruction  . ED Diagnosis:   Final diagnoses:   Small bowel obstruction (H)     Allergies:   Allergies   Allergen Reactions     No Known Drug Allergies        Code Status: Full Code  Activity level - Baseline/Home:  Independent. Activity Level - Current:   Independent. Lift room needed: No. Bariatric: No   Needed: No   Isolation: No. Infection: Not Applicable.     Vital Signs:   Vitals:    05/24/21 1400 05/24/21 1415 05/24/21 1445 05/24/21 1500   BP: 121/73 118/71 128/75 119/73   Pulse: (!) 48 (!) 45 54 (!) 49   Resp:       Temp:       TempSrc:       SpO2: 100% 100% 100% 100%   Weight:           Cardiac Rhythm:  ,      Pain level:    Patient confused: No. Patient Falls Risk: No.   Elimination Status: Has voided   Patient Report - Initial Complaint: Pt with abdominal pain since Saturday, denies vomiting but c/o nausea and loose stools. Hx of SBO, had xray done at [facility] which shows SBO. ABC's intact, alert and oriented x3. Focused Assessment:  14:20 Cardiac Cardiac - Cardiac WDL: WDL  TF      14:20 Respiratory Respiratory - Respiratory WDL: WDL  Breath Sounds: LAMONTE; RUL  LAMONTE Breath Sounds: Anterior:; clear; equal bilaterally  RUL Breath Sounds: Anterior:; clear; equal bilaterally  TF     14:20 Neurological Cognitive - Cognitive/Neuro/Behavioral WDL: WDL   CAM-ICU - RASS (Coreas Agitation-Sedation Scale): 0-->alert and calm  Next step for nurse to take:: Continue test   Jose A Coma Scale - Best Eye Response: 4-->(E4) spontaneous  Best Motor Response: 6-->(M6) obeys commands  Best Verbal Response: 5-->(V5) oriented  Jose A Coma Scale Score: 15           Tests Performed: lab, imaging. Abnormal Results: see epic   Treatments provided: fluids, morphine, zofran  Family Comments: family at bedside  OBS brochure/video discussed/provided to patient:  Yes  ED Medications:   Medications    ondansetron (ZOFRAN) injection 4 mg (4 mg Intravenous Given 5/24/21 1511)   morphine (PF) injection 4 mg (4 mg Intravenous Given 5/24/21 1511)   0.9% sodium chloride BOLUS (0 mLs Intravenous Stopped 5/24/21 1506)   iopamidol (ISOVUE-370) solution 500 mL (89 mLs Intravenous Given 5/24/21 1426)   sodium chloride 0.9 % bag 500mL for CT scan flush use (62 mLs As instructed Given 5/24/21 1426)     Drips infusing:  No  For the majority of the shift, the patient's behavior Green. Interventions performed were n/a.    Sepsis treatment initiated: No     Patient tested for COVID 19 prior to admission: YES    ED Nurse Name/Phone Number: Sunitha Silva RN,   3:52 PM  RECEIVING UNIT ED HANDOFF REVIEW    Above ED Nurse Handoff Report was reviewed: Yes  Reviewed by: Luz Marina Moran RN on May 24, 2021 at 4:10 PM

## 2021-05-24 NOTE — H&P
[facility]  Hospitalist Admission Note  Name: Vladimir Morse    MRN: [id number]  YOB: 1955    Age: 65 year old  Date of admission: 5/24/2021  Primary care provider: Reyes Hinkle            Assessment and Plan:   Vladimir Morse is a 65 year old male with background history of prior small bowel obstruction, diverticulitis, nephrolithiasis, prior abdominal surgery for perforated bowel, hypertension, dyslipidemia, Gilbert's syndrome, prior bouts of small bowel obstruction not needing surgical intervention and apparently was on his usual state of health until at least 2 days prior to this hospitalization he started to complains of abdominal pain mostly at the left lower quadrant    1.  Abdominal pain, nausea and vomiting secondary to recurrent small bowel obstruction  2.  Prior history of  Multiple bouts of small bowel obstruction  3.  Previous history of abdominal surgery for perforated bowel during infancy  4.  History of diverticulitis  5.  Hypertension  6.  History of dyslipidemia  7.  Nonobstructive nephrolithiasis    Admit as inpatient.  At risk for clinical deterioration.  Continue n.p.o. status.  Aggressive IV fluid support.  Monitor electrolytes and appropriate supplementation protocol in place  Formal general surgery evaluation requested  Low threshold for nasogastric tube insertion if with worsening abdominal pain, nausea or vomiting  Optimize pain control  IV Pepcid  Hold home regimen for hypertension and dyslipidemia.  As needed Apresoline  Supportive care with as needed antiemetics    Code status: Full code  Admit to inpatient  Prophylaxis: Mechanical  Disposition: Hopeful for home discharge but likely will be needing at least 2 inpatient days          Chief Complaint:   Abdominal pain at least in the past 2 days       Source of Information:   Patient with good reliability  Discussion with ED physician  Review of E chart records         History of Present Illness:   Vladimir SIMEON  Mini is a 65 year old male with background history of prior small bowel obstruction, diverticulitis, nephrolithiasis, prior abdominal surgery for perforated bowel, hypertension, dyslipidemia, Gilbert's syndrome, prior bouts of small bowel obstruction not needing surgical intervention and apparently was on his usual state of health until at least 2 days prior to this hospitalization he started to complains of abdominal pain mostly at the left lower quadrant with maximum intensity of 7 out of 10.  This was accompanied with intermittent bouts of nausea and vomiting and some loose stooling but denies any fevers, shortness of breath, urinary symptomatology, any bleeding tendencies such as bright red blood per rectum or coffee-ground emesis.  With this ongoing symptomatology he was seen in the outpatient clinic where bowel obstruction versus diverticulitis was entertained and pursued a abdominal x-ray that time which concerning for ileus versus SBO.  He was subsequently referred to the emergency room for further evaluation and care.  Upon presentation he has stable hemodynamics, reassuring complete blood count, lactic acid was normal and further imaging did confirm presence of small bowel obstruction with distinct transition point.  He was not having any symptoms of nausea and vomiting in the ER and nasogastric tube insertion was deferred for now.  He was provided with Zofran, pain regimen and IV fluid support.  He is referred to us for further evaluation and care hence this hospitalization.           Past Medical History:     Past Medical History:   Diagnosis Date     BPPV (benign paroxysmal positional vertigo), unspecified laterality 7/3/2015     Cerebral aneurysm 8/4/2011    atherosclerotic     DIVERTICULITIS OF COLON W/O BLEED 6/29/2006     Elevated blood pressure reading without diagnosis of hypertension 7/22/2018     Essential hypertension, benign 2/22/2018     Gilbert's syndrome 12/13/2016     Hyperlipidemia LDL  goal <100 11/28/2014     Knee pain 2/6/2009     Lipoma of skin and subcutaneous tissue 6/22/2012     Nephrolithiasis 6/22/2012     Pain in joint, shoulder region 9/18/2010     Presbycusis 6/22/2012     SBO (small bowel obstruction) (H) 7/27/2012     Subungual hematoma of right foot, initial encounter 7/22/2018     TIA (transient ischaemic attack)      Unspecified intestinal obstruction     diverticulitis     Vasculogenic erectile dysfunction 5/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 Freedman [facility]     CYSTOSCOPY, RETROGRADES, INSERT STENT URETER(S), COMBINED  6/29/2012    Procedure: COMBINED CYSTOSCOPY, RETROGRADES, INSERT STENT URETER(S);  LEFT URETEROSCOPY, LEFT URETERAL STENT PLACEMENT;  Surgeon: Timothy Ortega MD;  Location:  OR     EXCISE LESION BACK Left 9/21/2018    Procedure: EXCISE LESION BACK;  Excision subcutaneous mass and skin lesion left back;  Surgeon: Prosper Loving MD;  Location:  OR     EXTRACORPOREAL SHOCK WAVE LITHOTRIPSY (ESWL)  12/20/2012    Procedure: EXTRACORPOREAL SHOCK WAVE LITHOTRIPSY (ESWL);  LEFT EXTRACORPOREAL SHOCKWAVE LITHOTRIPSY ;  Surgeon: Timothy Ortega MD;  Location:  OR     ORTHOPEDIC SURGERY      knee cyst removed     TONSILLECTOMY      T&A as a child     ZZC NONSPECIFIC PROCEDURE      Perforated bowel; infant             Social History:     Social History     Tobacco Use     Smoking status: Never Smoker     Smokeless tobacco: Never Used   Substance Use Topics     Alcohol use: No     Comment: rarely             Family History:   Family history was fully reviewed and non-contributory in this case.         Allergies:     Allergies   Allergen Reactions     No Known Drug Allergies              Medications:      Prior to Admission medications    Medication Sig Last Dose Taking? Auth Provider   aspirin 81 MG EC tablet Take 81 mg by mouth daily.     Reported, Patient   atorvastatin (LIPITOR) 20 MG tablet Take 1 tablet (20 mg) by mouth daily   Reyes Hinkle MD   IBUPROFEN PO    Reported, Patient   ramipril (ALTACE) 10 MG capsule TAKE ONE CAPSULE BY MOUTH EVERY DAY   Reyes Hinkle MD   sildenafil (VIAGRA) 100 MG tablet Take 1 tablet (100 mg) by mouth daily as needed   Reyes Hinkle MD             Review of Systems:   A Comprehensive greater than 10 system review of systems was carried out.  Pertinent positives and negatives are noted above.  Otherwise negative for contributory information.           Physical Exam:   Blood pressure 119/73, pulse (!) 49, temperature 96.3  F (35.7  C), temperature source Temporal, resp. rate 18, weight 80 kg (176 lb 5.9 oz), SpO2 100 %.  Wt Readings from Last 1 Encounters:   05/24/21 80 kg (176 lb 5.9 oz)     Exam:  GENERAL: No apparent distress. Awake, alert, and fully oriented.  HEENT: Normocephalic, atraumatic. Extraocular movements intact.  CARDIOVASCULAR: Regular rate and rhythm without murmurs or rubs. No JVD  PULMONARY: Clear to auscultation, no wheezes, crackles  ABDOMINAL: Soft, diffuse tenderness, no rebound or guarding,  EXTREMITIES: No cyanosis or clubbing. No edema.  NEUROLOGICAL: CN 2-12 grossly intact, awake and alert x3, spontaneous and coherent speech. no focal neurological deficits.  DERMATOLOGICAL: No rash, ulcer, ecchymoses, jaundice.  Psych: not agitation, not combative, pleasant mood           Data:   EKG: No new EKG seen in epic    Imaging:  Results for orders placed or performed during the hospital encounter of 05/24/21   CT Abdomen Pelvis w Contrast    Narrative    CT ABDOMEN PELVIS WITH CONTRAST 5/24/2021 2:40 PM    CLINICAL HISTORY: Abdominal pain, acute, nonlocalized. Nausea,  abdominal pain, x-ray concerning for small-bowel obstruction.      TECHNIQUE: CT  scan of the abdomen and pelvis was performed following  injection of IV contrast. Multiplanar reformats were obtained. Dose  reduction techniques were used.  CONTRAST: 89mL Isovue-370    COMPARISON: 7/27/2012    FINDINGS:   LOWER CHEST: Normal.    HEPATOBILIARY: Indeterminate low-density lesion in the superior right  lobe of the liver measures 1.2 cm (series 3, image 8). This is  unchanged from 2012 and considered benign. There is another similar  lesion in the posterior right lobe (series 3, image 11), also stable.  No new liver lesions or biliary dilation. The gallbladder is  unremarkable.    PANCREAS: Normal.    SPLEEN: Normal.    ADRENAL GLANDS: Normal.    KIDNEYS/BLADDER: Bilateral renal cysts are unchanged. There is  nonobstructing nephrolithiasis with 2 stones in the right kidney  measuring up to 4 mm and 2 stones in the left kidney measuring up to 6  mm. No hydronephrosis or hydroureter. Urinary bladder unremarkable.    BOWEL: Multiple dilated loops of small bowel measure up to 4.6 cm.  There is abrupt transition to nondilated small bowel in the right  lower quadrant. The colon is not dilated. There is sigmoid  diverticulosis without diverticulitis. No free air or intra-abdominal  abscess. A small amount of ascites is present.    PELVIC ORGANS: Mild lobular enlargement of the prostate.    ADDITIONAL FINDINGS: No lymphadenopathy.    MUSCULOSKELETAL: No destructive bone lesions.      Impression    IMPRESSION:   1.  Dilated small bowel with abrupt transition point in the right  lower quadrant compatible with mechanical obstruction. No free air.  Trace ascites. No abscess.  2.  Nonobstructing bilateral nephrolithiasis.    LOLITA MORIN MD       Labs:  No results for input(s): CULT in the last 168 hours.  Recent Labs   Lab 05/24/21  1027   WBC 8.2   HGB 16.5   HCT 51.0   MCV 91        Recent Labs   Lab 05/24/21  1311 05/24/21  1307   NA  --  139   POTASSIUM  --  4.2   CHLORIDE  --  107   CO2  --  26    ANIONGAP  --  6   GLC  --  72   BUN  --  15   CR  --  0.72   GFRESTIMATED >90 >90   GFRESTBLACK >90 >90   FREDDY  --  8.8   PROTTOTAL  --  7.5   ALBUMIN  --  3.7   BILITOTAL  --  4.0*   ALKPHOS  --  114   AST  --  32   ALT  --  39     No results for input(s): CKT in the last 168 hours.    Invalid input(s): CK, CK TOTAL  Recent Labs   Lab 05/24/21  1027   SED 5     Recent Labs   Lab 05/24/21  1307   GLC 72     No results for input(s): INR in the last 168 hours.  No results for input(s): LIPASE in the last 168 hours.  No results for input(s): TROPONIN, TROPI, TROPR in the last 168 hours.    Invalid input(s): TROP, TROPONINIES  No results for input(s): COLOR, APPEARANCE, URINEGLC, URINEBILI, URINEKETONE, SG, UBLD, URINEPH, PROTEIN, UROBILINOGEN, NITRITE, LEUKEST, RBCU, WBCU in the last 168 hours.

## 2021-05-24 NOTE — ED PROVIDER NOTES
History   Chief Complaint  Small Bowel Obstruction    HPI  Vladimir Morse is a 65 year old male with a history of small bowel obstruction, diverticulitis, and nephrolithiasis, who presents for evaluation of abdominal pain in the setting of an xray concerning for a small bowel obstruction. The patient reports that he has been experiencing abdominal pain for the past two days now. He describes it as a sharp pain that is currently a 7/10, exacerbated by laying down. It feels similar to his past obstructions. This pain has been associated with nausea, vomiting, and diarrhea, with his most recent bowel movement being today. He went to his clinic this morning where they performed an abdominal xray which was concerning for a SBO, prompting his visit to the ED. Denies any back pain, chest pain, shortness of breath, dysuria, or increased urination.     Workup from Clinic visit:  XR Abdomen 2 Views:   Multiple moderately dilated gas-filled small bowel loops noted within the abdomen diffusely. Normal volume of gas and stool  within the colon. Findings may reflect a diffuse small bowel ileus, but partial obstruction is not excluded. No free air. Clear lung bases. As per radiology.     CBC: WBC: 8.2, HGB: 16.5, PLT: 241  CMP: Pending  Erythrocyte sedimentation rate: 5    Review of Systems   Respiratory: Negative for shortness of breath.    Cardiovascular: Negative for chest pain.   Gastrointestinal: Positive for abdominal pain, diarrhea, nausea and vomiting.   Genitourinary: Negative for dysuria and frequency.   Musculoskeletal: Negative for back pain.   All other systems reviewed and are negative.    Allergies  The patient has no known allergies.     Medications  Aspirin  Lipitor  Altace  Viagra    Past Medical History  BPPV  Cerebral aneurysm  Diverticulitis  Hypertension  Gilbert's syndrome  Hyperlipidemia  Knee pain  Lipoma of skin  Nephrolithiasis  Presbycusis  SBO  Subungual hematoma  TIA  ED  Osteoarthritis    Past  Surgical History  Arthroscopy knee  Total knee arthroplasty  Combined cystoscopy, retrogrades, insert stent ureters  Excise lesion back  Extracorporeal shock wave lithotripsy  Tonsillectomy  Perforated bowel     Family History  Breast cancer  Colorectal cancer    Social History  Presents to the ED with his wife.   Negative for tobacco use.  Negative for alcohol use.  Negative for drug use.    Physical Exam     Patient Vitals for the past 24 hrs:   BP Temp Temp src Pulse Resp SpO2 Weight   05/24/21 1540 113/71 -- -- (!) 44 -- 99 % --   05/24/21 1500 119/73 -- -- (!) 49 -- 100 % --   05/24/21 1445 128/75 -- -- 54 -- 100 % --   05/24/21 1415 118/71 -- -- (!) 45 -- 100 % --   05/24/21 1400 121/73 -- -- (!) 48 -- 100 % --   05/24/21 1345 99/76 -- -- (!) 49 -- 100 % --   05/24/21 1330 104/73 -- -- (!) 48 -- 100 % --   05/24/21 1315 103/73 -- -- 72 -- 100 % --   05/24/21 1310 107/73 -- -- -- -- -- --   05/24/21 1207 136/86 96.3  F (35.7  C) Temporal 56 18 99 % 80 kg (176 lb 5.9 oz)     Physical Exam  Constitutional: Well developed, nontox appearance  Head: Atraumatic.   Mouth/Throat: Oropharynx is clear and moist.   Neck:  no stridor  Eyes: no scleral icterus  Cardiovascular: Borderline regular bradycardia, 2+ bilat radial pulses  Pulmonary/Chest: nml resp effort,   Abdominal: ND, soft, diffuse tenderness, no rebound or guarding   : no CVA tenderness bilat  Ext: Warm, well perfused, no edema  Neurological: A&O, symmetric facies, moves ext x4  Skin: Skin is warm and dry.   Psychiatric: Behavior is normal. Thought content normal.   Nursing note and vitals reviewed.    Emergency Department Course   Imaging:  CT Abdomen/Pelvis with IV contrast:   1.  Dilated small bowel with abrupt transition point in the right lower quadrant compatible with mechanical obstruction. No free air. Trace ascites. No abscess.   2.  Nonobstructing bilateral nephrolithiasis.  As per radiology.    Laboratory:  1307 Lactic acid whole blood:  1.1    CMP: Bilirubin direct 4.0 (H), o/w WNL (Creatinine: 0.72)    Creatinine POCT: creatinine 0.7, GFR estimate > 90    Asymptomatic COVID19 Virus PCR by nasopharyngeal swab: pending     Emergency Department Course:  Reviewed:  I reviewed nursing notes, vitals and past medical history    Assessments:  1250 I physically examined the patient as documented above.    1630 I rechecked the patient.    Consults:   153 I consulted with Dr. Robert, on call hospitalist, regarding the patient's history and presentation here in the emergency department.    Interventions:  1308 NS 1L IV  1309 Zofran 4 mg IV  1309 Morphine 4 mg IV  1511 Zofran 4 mg IV  1511 Morphine 4 mg IV    Disposition:  The patient was admitted to the hospital under the care of Dr. Robert.     Impression & Plan   Medical Decision Makin year old male presenting w/ nominal pain, x-ray concerning for small bowel obstruction     DDx includes small bowel obstruction, partial small bowel obstruction, hepatitis, pancreatitis, abdominal pain NOS.  Doubt dissection, AAA given history and physical exam.  Labs significant for nml lactate.  Imaging sig for small bowel obstruction.  Doubt ischemia or perforation.  Interventions as noted above with improvement in symptoms.  Pt admitted to hospitalist service for further evaluation and mgmt.  I do not think emergent general surgery c/s is indicated from the ED.  Patient and wife counseled on all results, disposition and diagnosis.  They are understanding and agreeable to plan. Patient discharged in stable condition.      Diagnosis:    ICD-10-CM    1. Small bowel obstruction (H)  K56.609 Lactic acid whole blood     Creatinine POCT     Creatinine POCT     Asymptomatic SARS-CoV-2 COVID-19 Virus (Coronavirus) by PCR     Comprehensive metabolic panel     Scribe Disclosure:  Markus PRASAD, am serving as a scribe at 12:42 PM on 2021 to document services personally performed by Mark Casillas MD based on  my observations and the provider's statements to me.      Mark Casillas MD  05/25/21 [id number]

## 2021-05-24 NOTE — PHARMACY-ADMISSION MEDICATION HISTORY
Admission medication history interview status for this patient is complete. See [facility] admission navigator for allergy information, prior to admission medications and immunization status.     Medication history interview done, indicate source(s): Patient  Medication history resources (including written lists, pill bottles, clinic record): Rx refill hx  Pharmacy: [facility] [place]    Changes made to PTA medication list:  Added: Ibuprofen dosing  Deleted: None  Changed: None    Actions taken by pharmacist (provider contacted, etc):None     Additional medication history information:None    Medication reconciliation/reorder completed by provider prior to medication history?  Yes   (Y/N)       Prior to Admission medications    Medication Sig Last Dose Taking? Auth Provider   aspirin 81 MG EC tablet Take 81 mg by mouth daily.   5/23/2021 at Unknown time Yes Reported, Patient   atorvastatin (LIPITOR) 20 MG tablet Take 1 tablet (20 mg) by mouth daily 5/23/2021 at pm Yes Reyes Hinkle MD   ibuprofen (ADVIL/MOTRIN) 200 MG tablet Take 200 mg by mouth as needed for mild pain prn Yes Unknown, Entered By History   ramipril (ALTACE) 10 MG capsule TAKE ONE CAPSULE BY MOUTH EVERY DAY 5/23/2021 at Unknown time Yes Reyes Hinkle MD   sildenafil (VIAGRA) 100 MG tablet Take 1 tablet (100 mg) by mouth daily as needed prn  Reyes Hinkle MD

## 2021-05-24 NOTE — PROGRESS NOTES
"    Assessment & Plan     LLQ abdominal pain  History of diverticulitis  History of intestinal obstruction  Diverticulitis vs SBO.   Unable to get patient in for STAT CT until later afternoon, opted for X-ray to assess SBO status. X-ray confirmed SBO vs ileus, direct admission attempted with [facility] unable to accept patient. Patient and wife not wanting to use other facilities and opted for Emergency Department for further management.   Report called to [facility] Emergency Department.   - CT Abdomen Pelvis w Contrast  - CBC with platelets and differential  - Comprehensive metabolic panel (BMP + Alb, Alk Phos, ALT, AST, Total. Bili, TP)  - ESR: Erythrocyte sedimentation rate               BMI:   Estimated body mass index is 27.06 kg/m  as calculated from the following:    Height as of this encounter: 1.727 m (5' 8\").    Weight as of this encounter: 80.7 kg (178 lb).           Return for STAT CT today.    VERO Rosales CNP  [facility]        Jasmyne Gilbert is a 65 year old who presents for the following health issues     HPI     Abdominal/Flank Pain  Onset/Duration: 5/22/21  Description:   Character: Sharp  Location: left lower quadrant  Radiation: None  Intensity: moderate, severe  Progression of Symptoms:  worsening  Accompanying Signs & Symptoms:  Fever/Chills: no  Gas/Bloating: YES  Nausea: YES  Vomitting: no  Diarrhea: YES  Constipation: no  Dysuria or Hematuria: no  History:   Trauma: no  Previous similar pain: YES  Previous tests done: none  Precipitating factors:   Does the pain change with:     Food: hasn't eaten since 5/23/21    Bowel Movement: YES- felt worse    Urination: no   Other factors:  no  Therapies tried and outcome: None    History of diverticulitis in 2006, feeling similar in symptoms.   History of recurrent SBO.   History of bowel perforation at birth.   No solid food x 2 days as was concerned for SBO, is with small amount of clear liquids. " "      Review of Systems   Constitutional, HEENT, cardiovascular, pulmonary, gi and gu systems are negative, except as otherwise noted.      Objective    /78 (BP Location: Right arm, Patient Position: Sitting, Cuff Size: Adult Regular)   Pulse 78   Temp 98.4  F (36.9  C) (Oral)   Resp 16   Ht 1.727 m (5' 8\")   Wt 80.7 kg (178 lb)   BMI 27.06 kg/m    Body mass index is 27.06 kg/m .  Physical Exam   GENERAL: healthy, alert and no distress  RESP: lungs clear to auscultation - no rales, rhonchi or wheezes  CV: regular rate and rhythm, normal S1 S2, no S3 or S4, no murmur, click or rub, no peripheral edema and peripheral pulses strong  ABDOMEN: tenderness LUQ and LLQ and hypoactive bowel sounds.     CBC, CMP, ESR, and abdomen CT pending.     Recent Results (from the past 24 hour(s))   CBC with platelets and differential    Collection Time: 05/24/21 10:27 AM   Result Value Ref Range    WBC 8.2 4.0 - 11.0 10e9/L    RBC Count 5.62 4.4 - 5.9 10e12/L    Hemoglobin 16.5 13.3 - 17.7 g/dL    Hematocrit 51.0 40.0 - 53.0 %    MCV 91 78 - 100 fl    MCH 29.4 26.5 - 33.0 pg    MCHC 32.4 31.5 - 36.5 g/dL    RDW 14.1 10.0 - 15.0 %    Platelet Count 241 150 - 450 10e9/L    % Neutrophils 69.3 %    % Lymphocytes 22.6 %    % Monocytes 6.7 %    % Eosinophils 1.0 %    % Basophils 0.4 %    Absolute Neutrophil 5.7 1.6 - 8.3 10e9/L    Absolute Lymphocytes 1.9 0.8 - 5.3 10e9/L    Absolute Monocytes 0.6 0.0 - 1.3 10e9/L    Absolute Eosinophils 0.1 0.0 - 0.7 10e9/L    Absolute Basophils 0.0 0.0 - 0.2 10e9/L    Diff Method Automated Method    ESR: Erythrocyte sedimentation rate    Collection Time: 05/24/21 10:27 AM   Result Value Ref Range    Sed Rate 5 0 - 20 mm/h               "

## 2021-05-24 NOTE — LETTER
[facility]  [address]  [facility] [address]  [phone]    May 28, 2021    Re: Vladimir Morse  [address] [place] [facility] [address]  [phone] (home) none (work)    : 1955      To Whom It May Concern:      Vladimir Morse was hospitalized from 2021 until 2021 due to medical illness.  He may return to work on 2021 with full duty.        Sincerely,        Abad Causey MD   Hospitalist.

## 2021-05-24 NOTE — ED TRIAGE NOTES
Pt with abdominal pain since Saturday, denies vomiting but c/o nausea and loose stools. Hx of SBO, had xray done at [facility] which shows SBO. ABC's intact, alert and oriented x3.

## 2021-05-24 NOTE — LETTER
[facility]  [address]  [facility] [address]  [phone]    May 28, 2021    Re: Vladimir Morse  [address] [place] [facility] [address]  [phone] (home) none (work)    : 1955      To Whom It May Concern:      Vladimir Morse was hospitalized from 2021 until 2021 due to medical illness.  He may return to work on 2021 with full duty.        Sincerely,        Abad Causey MD   Hospitalist

## 2021-05-25 LAB
ANION GAP SERPL CALCULATED.3IONS-SCNC: 3 MMOL/L (ref 3–14)
BASOPHILS # BLD AUTO: 0 10E9/L (ref 0–0.2)
BASOPHILS NFR BLD AUTO: 0.5 %
BUN SERPL-MCNC: 10 MG/DL (ref 7–30)
CALCIUM SERPL-MCNC: 8 MG/DL (ref 8.5–10.1)
CHLORIDE SERPL-SCNC: 108 MMOL/L (ref 94–109)
CO2 SERPL-SCNC: 29 MMOL/L (ref 20–32)
CREAT SERPL-MCNC: 0.73 MG/DL (ref 0.66–1.25)
DIFFERENTIAL METHOD BLD: NORMAL
EOSINOPHIL # BLD AUTO: 0.1 10E9/L (ref 0–0.7)
EOSINOPHIL NFR BLD AUTO: 2.3 %
ERYTHROCYTE [DISTWIDTH] IN BLOOD BY AUTOMATED COUNT: 13.2 % (ref 10–15)
GFR SERPL CREATININE-BSD FRML MDRD: >90 ML/MIN/{1.73_M2}
GLUCOSE SERPL-MCNC: 92 MG/DL (ref 70–99)
HCT VFR BLD AUTO: 43 % (ref 40–53)
HGB BLD-MCNC: 13.6 G/DL (ref 13.3–17.7)
IMM GRANULOCYTES # BLD: 0 10E9/L (ref 0–0.4)
IMM GRANULOCYTES NFR BLD: 0.3 %
LYMPHOCYTES # BLD AUTO: 1.9 10E9/L (ref 0.8–5.3)
LYMPHOCYTES NFR BLD AUTO: 32.9 %
MCH RBC QN AUTO: 29.5 PG (ref 26.5–33)
MCHC RBC AUTO-ENTMCNC: 31.6 G/DL (ref 31.5–36.5)
MCV RBC AUTO: 93 FL (ref 78–100)
MONOCYTES # BLD AUTO: 0.4 10E9/L (ref 0–1.3)
MONOCYTES NFR BLD AUTO: 7 %
NEUTROPHILS # BLD AUTO: 3.3 10E9/L (ref 1.6–8.3)
NEUTROPHILS NFR BLD AUTO: 57 %
NRBC # BLD AUTO: 0 10*3/UL
NRBC BLD AUTO-RTO: 0 /100
PLATELET # BLD AUTO: 199 10E9/L (ref 150–450)
POTASSIUM SERPL-SCNC: 4.4 MMOL/L (ref 3.4–5.3)
RBC # BLD AUTO: 4.61 10E12/L (ref 4.4–5.9)
SODIUM SERPL-SCNC: 140 MMOL/L (ref 133–144)
WBC # BLD AUTO: 5.8 10E9/L (ref 4–11)

## 2021-05-25 PROCEDURE — 99222 1ST HOSP IP/OBS MODERATE 55: CPT | Performed by: SURGERY

## 2021-05-25 PROCEDURE — 85025 COMPLETE CBC W/AUTO DIFF WBC: CPT | Performed by: INTERNAL MEDICINE

## 2021-05-25 PROCEDURE — 80048 BASIC METABOLIC PNL TOTAL CA: CPT | Performed by: INTERNAL MEDICINE

## 2021-05-25 PROCEDURE — 99232 SBSQ HOSP IP/OBS MODERATE 35: CPT | Performed by: INTERNAL MEDICINE

## 2021-05-25 PROCEDURE — 120N000001 HC R&B MED SURG/OB

## 2021-05-25 PROCEDURE — 250N000011 HC RX IP 250 OP 636: Performed by: INTERNAL MEDICINE

## 2021-05-25 PROCEDURE — 250N000009 HC RX 250: Performed by: INTERNAL MEDICINE

## 2021-05-25 PROCEDURE — 258N000003 HC RX IP 258 OP 636: Performed by: INTERNAL MEDICINE

## 2021-05-25 PROCEDURE — 36415 COLL VENOUS BLD VENIPUNCTURE: CPT | Performed by: INTERNAL MEDICINE

## 2021-05-25 RX ADMIN — FAMOTIDINE 20 MG: 10 INJECTION, SOLUTION INTRAVENOUS at 07:41

## 2021-05-25 RX ADMIN — POTASSIUM CHLORIDE, DEXTROSE MONOHYDRATE AND SODIUM CHLORIDE: 150; 5; 450 INJECTION, SOLUTION INTRAVENOUS at 13:02

## 2021-05-25 RX ADMIN — POTASSIUM CHLORIDE, DEXTROSE MONOHYDRATE AND SODIUM CHLORIDE: 150; 5; 450 INJECTION, SOLUTION INTRAVENOUS at 03:24

## 2021-05-25 RX ADMIN — FAMOTIDINE 20 MG: 10 INJECTION, SOLUTION INTRAVENOUS at 21:18

## 2021-05-25 RX ADMIN — ONDANSETRON 4 MG: 2 INJECTION INTRAMUSCULAR; INTRAVENOUS at 15:28

## 2021-05-25 RX ADMIN — ONDANSETRON 4 MG: 2 INJECTION INTRAMUSCULAR; INTRAVENOUS at 00:00

## 2021-05-25 RX ADMIN — HYDROMORPHONE HYDROCHLORIDE 0.2 MG: 0.2 INJECTION, SOLUTION INTRAMUSCULAR; INTRAVENOUS; SUBCUTANEOUS at 21:22

## 2021-05-25 RX ADMIN — HYDROMORPHONE HYDROCHLORIDE 0.2 MG: 0.2 INJECTION, SOLUTION INTRAMUSCULAR; INTRAVENOUS; SUBCUTANEOUS at 00:00

## 2021-05-25 RX ADMIN — ONDANSETRON 4 MG: 2 INJECTION INTRAMUSCULAR; INTRAVENOUS at 07:35

## 2021-05-25 ASSESSMENT — ACTIVITIES OF DAILY LIVING (ADL)
ADLS_ACUITY_SCORE: 11

## 2021-05-25 NOTE — PLAN OF CARE
PRIMARY DIAGNOSIS: SBO  GOALS TO BE MET BEFORE DISCHARGE:  1. Stable vital signs Yes  2. Tolerating diet:NPO  3. Pain controlled with oral pain medications:  No  4. Positive bowel sounds:  Yes  5. Voiding without difficulty:  Yes  6. Able to ambulate:  Yes  7. Provider specific discharge goals met:   YONATHAN    Discharge Planner Nurse   Safe discharge environment identified: Yes  Barriers to discharge: Yes, NPO, SBO unresolved       Entered by: Summer Thomas 05/25/2021 4:11 AM   Pt A/O x 4, VSS, afebrile, on RA. LS clear, denies SOB. Up independently, ambulated hallways. NPO, D5 20K @ 100mL. Voiding in adequate amounts. LLQ tender on palpation, pain rated 5/10. IV dilaudid x1, nausea managed w/zofran.

## 2021-05-25 NOTE — PLAN OF CARE
Ambulating independently in hallways.  IV infusing.  Advanced to ice chips only.  BS hypoactive and heard in LLQ only.  Passing small amounts flatus.   Abdomen slightly distended.  No BM.

## 2021-05-25 NOTE — PROGRESS NOTES
[facility]  Hospitalist Progress Note  Dustin Robert MD, MD 05/25/2021  (Text Page)  Reason for Stay (Diagnosis): Small bowel obstruction recurrent         Assessment and Plan:      Summary of Stay:Vladimir Morse is a 65 year old male with background history of prior small bowel obstruction, diverticulitis, nephrolithiasis, prior abdominal surgery for perforated bowel, hypertension, dyslipidemia, Gilbert's syndrome, prior bouts of small bowel obstruction not needing surgical intervention and apparently was on his usual state of health until at least 2 days prior to this hospitalization he started to complains of abdominal pain mostly at the left lower quadrant     1.  Abdominal pain, nausea and vomiting secondary to recurrent small bowel obstruction  2.  Prior history of  Multiple bouts of small bowel obstruction  3.  Previous history of abdominal surgery for perforated bowel during infancy  4.  History of diverticulitis  5.  Hypertension  6.  History of dyslipidemia  7.  Nonobstructive nephrolithiasis     Continue inpatient care.  Still at risk for clinical deterioration remain on n.p.o. status.  IV fluid support.  Patient is ambulatory and encouraged to take his ambulation  Appreciate general surgery input.  Earlier was passing flatus.  Abdominal films as needed  IV Pepcid  As needed antiemetics      DVT Prophylaxis: Pneumatic Compression Devices  Code Status: Full Code  Discharge Dispo: home  Estimated Disch Date / # of Days until Disch: 2 days          Interval History (Subjective):      Vladimir is in good spirits this morning as he is feeling better.  He denies any vomiting spells, no reported worsening abdominal pain was also happy that he demonstrated passing of flatus.  No reported BM yet.  Still with intermittent nausea.  Ambulatory with no assistance.  Remain afebrile.  Stable hemodynamics.  No mental status changes.  No significant events reported overnight.              Physical Exam:  "     Last Vital Signs:  /67 (BP Location: Left arm)   Pulse 53   Temp 96.7  F (35.9  C) (Oral)   Resp 18   Ht 1.727 m (5' 8\")   Wt 81.2 kg (179 lb)   SpO2 100%   BMI 27.22 kg/m      No intake/output data recorded.  Wt Readings from Last 1 Encounters:   05/24/21 81.2 kg (179 lb)     Vitals:    05/24/21 1207 05/24/21 1640   Weight: 80 kg (176 lb 5.9 oz) 81.2 kg (179 lb)       Constitutional: Awake, alert, cooperative, no apparent distress   Respiratory: Clear to auscultation bilaterally, no crackles or wheezing   Cardiovascular: Regular rate and rhythm, normal S1 and S2, and no murmur noted   Abdomen:  Scant bowel sounds, soft, non-distended, still with tenderness at the left upper and lower quadrant   Skin: No rashes, no cyanosis, dry to touch   Neuro: Alert and oriented x3, no weakness, spontaneous and coherent speech   Extremities: No edema, normal range of motion   Other(s): Euthymic mood, not agitated       All other systems: Negative          Medications:      All current medications were reviewed with changes reflected in problem list.         Data:      All new lab and imaging data was reviewed.   Labs:  No results for input(s): CULT in the last 168 hours.  Recent Labs   Lab 05/25/21  0634 05/24/21  1027   WBC 5.8 8.2   HGB 13.6 16.5   HCT 43.0 51.0   MCV 93 91    241     Recent Labs   Lab 05/25/21  0634 05/24/21  1700 05/24/21  1311 05/24/21  1307 05/24/21  1027     --   --  139 139   POTASSIUM 4.4 3.8  --  4.2 4.1   CHLORIDE 108  --   --  107 106   CO2 29  --   --  26 29   ANIONGAP 3  --   --  6 4   GLC 92  --   --  72 86   BUN 10  --   --  15 15   CR 0.73  --   --  0.72 0.76   GFRESTIMATED >90  --  >90 >90 >90   GFRESTBLACK >90  --  >90 >90 >90   FREDDY 8.0*  --   --  8.8 9.3   PROTTOTAL  --   --   --  7.5 7.5   ALBUMIN  --   --   --  3.7 4.0   BILITOTAL  --   --   --  4.0* 3.8*   ALKPHOS  --   --   --  114 111   AST  --   --   --  32 20   ALT  --   --   --  39 39     Recent Labs "   Lab 05/24/21  1027   SED 5     Recent Labs   Lab 05/25/21  0634 05/24/21  1307 05/24/21  1027   GLC 92 72 86     No results for input(s): INR in the last 168 hours.  No results for input(s): LIPASE in the last 168 hours.  No results for input(s): TROPONIN, TROPI, TROPR in the last 168 hours.    Invalid input(s): TROP, TROPONINIES  No results for input(s): COLOR, APPEARANCE, URINEGLC, URINEBILI, URINEKETONE, SG, UBLD, URINEPH, PROTEIN, UROBILINOGEN, NITRITE, LEUKEST, RBCU, WBCU in the last 168 hours.   Imaging:   Results for orders placed or performed during the hospital encounter of 05/24/21   CT Abdomen Pelvis w Contrast    Narrative    CT ABDOMEN PELVIS WITH CONTRAST 5/24/2021 2:40 PM    CLINICAL HISTORY: Abdominal pain, acute, nonlocalized. Nausea,  abdominal pain, x-ray concerning for small-bowel obstruction.      TECHNIQUE: CT scan of the abdomen and pelvis was performed following  injection of IV contrast. Multiplanar reformats were obtained. Dose  reduction techniques were used.  CONTRAST: 89mL Isovue-370    COMPARISON: 7/27/2012    FINDINGS:   LOWER CHEST: Normal.    HEPATOBILIARY: Indeterminate low-density lesion in the superior right  lobe of the liver measures 1.2 cm (series 3, image 8). This is  unchanged from 2012 and considered benign. There is another similar  lesion in the posterior right lobe (series 3, image 11), also stable.  No new liver lesions or biliary dilation. The gallbladder is  unremarkable.    PANCREAS: Normal.    SPLEEN: Normal.    ADRENAL GLANDS: Normal.    KIDNEYS/BLADDER: Bilateral renal cysts are unchanged. There is  nonobstructing nephrolithiasis with 2 stones in the right kidney  measuring up to 4 mm and 2 stones in the left kidney measuring up to 6  mm. No hydronephrosis or hydroureter. Urinary bladder unremarkable.    BOWEL: Multiple dilated loops of small bowel measure up to 4.6 cm.  There is abrupt transition to nondilated small bowel in the right  lower quadrant. The colon  is not dilated. There is sigmoid  diverticulosis without diverticulitis. No free air or intra-abdominal  abscess. A small amount of ascites is present.    PELVIC ORGANS: Mild lobular enlargement of the prostate.    ADDITIONAL FINDINGS: No lymphadenopathy.    MUSCULOSKELETAL: No destructive bone lesions.      Impression    IMPRESSION:   1.  Dilated small bowel with abrupt transition point in the right  lower quadrant compatible with mechanical obstruction. No free air.  Trace ascites. No abscess.  2.  Nonobstructing bilateral nephrolithiasis.    LOLITA MORIN MD

## 2021-05-25 NOTE — PLAN OF CARE
"/76 (BP Location: Left arm)   Pulse 74   Temp 95.9  F (35.5  C) (Temporal)   Resp 18   Ht 1.727 m (5' 8\")   Wt 81.2 kg (179 lb)   SpO2 100%   BMI 27.22 kg/m    Orientation: A&Ox4  Resp: LS Clear, RA  Pain: Managed with IV dilaudid  Activity: Independent  Diet: NPO, Zofran given for nausea x1  Voiding: spontaneously without difficulty  Will continue to monitor.     "

## 2021-05-25 NOTE — CONSULTS
[facility] Surgery Consultation    Vladimir Morse MRN# [id number]   Age: 65 year old YOB: 1955     Date of Admission:  5/24/2021    Reason for consult: Small bowel obstruction       Requesting physician: Jakob       Level of consult: Consult, follow and place orders           Assessment and Plan:   Assessment:   Recurrent Small bowel obstruction  Patient Active Problem List    Diagnosis Date Noted     Small bowel obstruction (H) 05/24/2021     Priority: Medium     Knee joint replaced by other means 08/21/2020     Priority: Medium     Vasculogenic erectile dysfunction 05/16/2019     Priority: Medium     Lipoma of skin and subcutaneous tissue 07/22/2018     Priority: Medium     Aftercare following surgery of the musculoskeletal system 03/19/2018     Priority: Medium     Obesity (BMI 30.0-34.9) 03/05/2018     Priority: Medium     Essential hypertension, benign 02/22/2018     Priority: Medium     Primary osteoarthritis of right knee 12/22/2017     Priority: Medium     Gilbert's syndrome 12/13/2016     Priority: Medium     BPPV (benign paroxysmal positional vertigo), unspecified laterality 07/03/2015     Priority: Medium     Hyperlipidemia 11/28/2014     Priority: Medium     9.2 % 10 yr risk 10/2015       Hypertension 06/27/2014     Priority: Medium     Overview:   Hypertension HTN NOS       Partial small bowel obstruction (H) 06/27/2014     Priority: Medium     Overview:   Obstruction Partial Small Bowel       Advanced directives, counseling/discussion 08/16/2012     Priority: Medium     Discussed advance care planning with patient; however, patient declined at this time. 8/16/2012          Nephrolithiasis 06/22/2012     Priority: Medium     Presbycusis 06/22/2012     Priority: Medium     Cerebral aneurysm 08/04/2011     Priority: Medium     Diverticulitis of colon 06/29/2006     Priority: Medium     Problem list name updated by automated process. Provider to review             Plan:   Abdominal  films as needed, could consider gastrografin challenge but is already passing gas  Adhesion lysis could be considered if fails to resolve  Discussed low roughage diet, he has been eating a lot of fresh/raw vegetables in an effort to improve his diet.   Discussed with hospitalist            Chief Complaint:   Abdominal pain, left lower quadrant with nausea and vomiting     History is obtained from the patient, electronic health record and hospitalist         History of Present Illness:   This patient is a 65 year old  male with a significant past medical history of perforated bowel in infancy with multiple subsequent episodes of SBO, all resolving non-opeeratively who presents with the following condition requiring a hospital admission: SBO. He reports feeling well until 2 days ago when he developed LLQ pain, nausea and vomiting.   Today he has started passing gas and feels improved, has had no pain meds today.           Past Medical History:     Past Medical History:   Diagnosis Date     BPPV (benign paroxysmal positional vertigo), unspecified laterality 7/3/2015     Cerebral aneurysm 8/4/2011    atherosclerotic     DIVERTICULITIS OF COLON W/O BLEED 6/29/2006     Elevated blood pressure reading without diagnosis of hypertension 7/22/2018     Essential hypertension, benign 2/22/2018     Gilbert's syndrome 12/13/2016     Hyperlipidemia LDL goal <100 11/28/2014     Knee pain 2/6/2009     Lipoma of skin and subcutaneous tissue 6/22/2012     Nephrolithiasis 6/22/2012     Pain in joint, shoulder region 9/18/2010     Presbycusis 6/22/2012     SBO (small bowel obstruction) (H) 7/27/2012     Subungual hematoma of right foot, initial encounter 7/22/2018     TIA (transient ischaemic attack)      Unspecified intestinal obstruction     diverticulitis     Vasculogenic erectile dysfunction 5/16/2019             Past Surgical History:     Past Surgical History:   Procedure Laterality Date     ARTHROSCOPY KNEE Right  10/26/2015    Procedure: ARTHROSCOPY KNEE;  Surgeon: Rodrigue Cole MD;  Location: [facility] OR     AS TOTAL KNEE ARTHROPLASTY Right 03/05/2018     COLONOSCOPY  11/23/2015    Dr. Tano OLMEDO     CYSTOSCOPY, RETROGRADES, INSERT STENT URETER(S), COMBINED  6/29/2012    Procedure: COMBINED CYSTOSCOPY, RETROGRADES, INSERT STENT URETER(S);  LEFT URETEROSCOPY, LEFT URETERAL STENT PLACEMENT;  Surgeon: Timothy Ortega MD;  Location: [facility] OR     EXCISE LESION BACK Left 9/21/2018    Procedure: EXCISE LESION BACK;  Excision subcutaneous mass and skin lesion left back;  Surgeon: Prosper Loving MD;  Location: [facility] OR     EXTRACORPOREAL SHOCK WAVE LITHOTRIPSY (ESWL)  12/20/2012    Procedure: EXTRACORPOREAL SHOCK WAVE LITHOTRIPSY (ESWL);  LEFT EXTRACORPOREAL SHOCKWAVE LITHOTRIPSY ;  Surgeon: Timothy Ortega MD;  Location:  OR     ORTHOPEDIC SURGERY      knee cyst removed     TONSILLECTOMY      T&A as a child     ZZC NONSPECIFIC PROCEDURE      Perforated bowel; infant             Social History:     Social History     Tobacco Use     Smoking status: Never Smoker     Smokeless tobacco: Never Used   Substance Use Topics     Alcohol use: No     Comment: rarely             Family History:     Family History   Problem Relation Age of Onset     Breast Cancer Mother      Cancer - colorectal Father      Colon Cancer Father      Breast Cancer Sister              Immunizations:     VACCINE/DOSE   Diptheria   DPT   DTAP   HBIG   Hepatitis A   Hepatitis B   HIB   Influenza   Measles   Meningococcal   MMR   Mumps   Pneumococcal   Polio   Rubella   Small Pox   TDAP   Varicella   Zoster             Allergies:     Allergies   Allergen Reactions     No Known Drug Allergies              Medications:     Current Facility-Administered Medications   Medication     dextrose 5% and 0.45% NaCl + KCl 20 mEq/L infusion     famotidine (PEPCID) injection 20 mg     hydrALAZINE (APRESOLINE) injection 10 mg     HYDROmorphone (DILAUDID) injection 0.2 mg      "lidocaine (LMX4) cream     lidocaine 1 % 0.1-1 mL     melatonin tablet 1 mg     naloxone (NARCAN) injection 0.2 mg    Or     naloxone (NARCAN) injection 0.4 mg    Or     naloxone (NARCAN) injection 0.2 mg    Or     naloxone (NARCAN) injection 0.4 mg     ondansetron (ZOFRAN-ODT) ODT tab 4 mg    Or     ondansetron (ZOFRAN) injection 4 mg     oxyCODONE (ROXICODONE) tablet 5-10 mg     prochlorperazine (COMPAZINE) injection 5 mg    Or     prochlorperazine (COMPAZINE) tablet 5 mg    Or     prochlorperazine (COMPAZINE) suppository 12.5 mg     sodium chloride (PF) 0.9% PF flush 3 mL     sodium chloride (PF) 0.9% PF flush 3 mL             Review of Systems:   CV: NEGATIVE for chest pain, palpitations or peripheral edema  C: NEGATIVE for fever, chills, change in weight  E/M: NEGATIVE for ear, mouth and throat problems  R: NEGATIVE for significant cough or SOB          Physical Exam:   All vitals have been reviewed  Patient Vitals for the past 24 hrs:   BP Temp Temp src Pulse Resp SpO2 Height Weight   05/25/21 0756 117/67 96.7  F (35.9  C) Oral 53 18 100 % -- --   05/25/21 0000 (!) 146/75 96.3  F (35.7  C) Temporal 54 18 100 % -- --   05/24/21 1730 -- -- -- -- 18 -- -- --   05/24/21 1640 137/76 95.9  F (35.5  C) Temporal 74 18 100 % 1.727 m (5' 8\") 81.2 kg (179 lb)   05/24/21 1610 106/77 -- -- (!) 48 -- 99 % -- --   05/24/21 1540 113/71 -- -- (!) 44 -- 99 % -- --   05/24/21 1500 119/73 -- -- (!) 49 -- 100 % -- --   05/24/21 1445 128/75 -- -- 54 -- 100 % -- --   05/24/21 1415 118/71 -- -- (!) 45 -- 100 % -- --   05/24/21 1400 121/73 -- -- (!) 48 -- 100 % -- --   05/24/21 1345 99/76 -- -- (!) 49 -- 100 % -- --   05/24/21 1330 104/73 -- -- (!) 48 -- 100 % -- --   05/24/21 1315 103/73 -- -- 72 -- 100 % -- --   05/24/21 1310 107/73 -- -- -- -- -- -- --   05/24/21 1207 136/86 96.3  F (35.7  C) Temporal 56 18 99 % -- 80 kg (176 lb 5.9 oz)     No intake or output data in the 24 hours ending 05/25/21 0920  Awake, alert, oriented, " conversant, with normal mood and affect    Neck:   skin normal and no stridor     Chest / Breast:   Nl resp effort     Abdomen:   scars noted large right paramedian, soft, ? Mildly distended, mild tenderness noted in the right lower quadrant and in the left upper quadrant, voluntary guarding absent and no masses palpated             Data:   All laboratory data reviewed  Results for orders placed or performed during the hospital encounter of 05/24/21   CT Abdomen Pelvis w Contrast     Status: None    Narrative    CT ABDOMEN PELVIS WITH CONTRAST 5/24/2021 2:40 PM    CLINICAL HISTORY: Abdominal pain, acute, nonlocalized. Nausea,  abdominal pain, x-ray concerning for small-bowel obstruction.      TECHNIQUE: CT scan of the abdomen and pelvis was performed following  injection of IV contrast. Multiplanar reformats were obtained. Dose  reduction techniques were used.  CONTRAST: 89mL Isovue-370    COMPARISON: 7/27/2012    FINDINGS:   LOWER CHEST: Normal.    HEPATOBILIARY: Indeterminate low-density lesion in the superior right  lobe of the liver measures 1.2 cm (series 3, image 8). This is  unchanged from 2012 and considered benign. There is another similar  lesion in the posterior right lobe (series 3, image 11), also stable.  No new liver lesions or biliary dilation. The gallbladder is  unremarkable.    PANCREAS: Normal.    SPLEEN: Normal.    ADRENAL GLANDS: Normal.    KIDNEYS/BLADDER: Bilateral renal cysts are unchanged. There is  nonobstructing nephrolithiasis with 2 stones in the right kidney  measuring up to 4 mm and 2 stones in the left kidney measuring up to 6  mm. No hydronephrosis or hydroureter. Urinary bladder unremarkable.    BOWEL: Multiple dilated loops of small bowel measure up to 4.6 cm.  There is abrupt transition to nondilated small bowel in the right  lower quadrant. The colon is not dilated. There is sigmoid  diverticulosis without diverticulitis. No free air or intra-abdominal  abscess. A small amount  of ascites is present.    PELVIC ORGANS: Mild lobular enlargement of the prostate.    ADDITIONAL FINDINGS: No lymphadenopathy.    MUSCULOSKELETAL: No destructive bone lesions.      Impression    IMPRESSION:   1.  Dilated small bowel with abrupt transition point in the right  lower quadrant compatible with mechanical obstruction. No free air.  Trace ascites. No abscess.  2.  Nonobstructing bilateral nephrolithiasis.    LOLITA MORIN MD   Lactic acid whole blood     Status: None   Result Value Ref Range    Lactic Acid 1.1 0.7 - 2.0 mmol/L   Creatinine POCT     Status: None   Result Value Ref Range    Creatinine 0.7 0.66 - 1.25 mg/dL    GFR Estimate >90 >60 mL/min/[1.73_m2]    GFR Estimate If Black >90 >60 mL/min/[1.73_m2]   Asymptomatic SARS-CoV-2 COVID-19 Virus (Coronavirus) by PCR     Status: None    Specimen: Nasopharyngeal   Result Value Ref Range    SARS-CoV-2 Virus Specimen Source Nasopharyngeal     SARS-CoV-2 PCR Result NEGATIVE     SARS-CoV-2 PCR Comment (Note)    Comprehensive metabolic panel     Status: Abnormal   Result Value Ref Range    Sodium 139 133 - 144 mmol/L    Potassium 4.2 3.4 - 5.3 mmol/L    Chloride 107 94 - 109 mmol/L    Carbon Dioxide 26 20 - 32 mmol/L    Anion Gap 6 3 - 14 mmol/L    Glucose 72 70 - 99 mg/dL    Urea Nitrogen 15 7 - 30 mg/dL    Creatinine 0.72 0.66 - 1.25 mg/dL    GFR Estimate >90 >60 mL/min/[1.73_m2]    GFR Estimate If Black >90 >60 mL/min/[1.73_m2]    Calcium 8.8 8.5 - 10.1 mg/dL    Bilirubin Total 4.0 (H) 0.2 - 1.3 mg/dL    Albumin 3.7 3.4 - 5.0 g/dL    Protein Total 7.5 6.8 - 8.8 g/dL    Alkaline Phosphatase 114 40 - 150 U/L    ALT 39 0 - 70 U/L    AST 32 0 - 45 U/L   Potassium     Status: None   Result Value Ref Range    Potassium 3.8 3.4 - 5.3 mmol/L   Basic metabolic panel     Status: Abnormal   Result Value Ref Range    Sodium 140 133 - 144 mmol/L    Potassium 4.4 3.4 - 5.3 mmol/L    Chloride 108 94 - 109 mmol/L    Carbon Dioxide 29 20 - 32 mmol/L    Anion Gap 3 3 -  14 mmol/L    Glucose 92 70 - 99 mg/dL    Urea Nitrogen 10 7 - 30 mg/dL    Creatinine 0.73 0.66 - 1.25 mg/dL    GFR Estimate >90 >60 mL/min/[1.73_m2]    GFR Estimate If Black >90 >60 mL/min/[1.73_m2]    Calcium 8.0 (L) 8.5 - 10.1 mg/dL   CBC with platelets differential     Status: None   Result Value Ref Range    WBC 5.8 4.0 - 11.0 10e9/L    RBC Count 4.61 4.4 - 5.9 10e12/L    Hemoglobin 13.6 13.3 - 17.7 g/dL    Hematocrit 43.0 40.0 - 53.0 %    MCV 93 78 - 100 fl    MCH 29.5 26.5 - 33.0 pg    MCHC 31.6 31.5 - 36.5 g/dL    RDW 13.2 10.0 - 15.0 %    Platelet Count 199 150 - 450 10e9/L    Diff Method Automated Method     % Neutrophils 57.0 %    % Lymphocytes 32.9 %    % Monocytes 7.0 %    % Eosinophils 2.3 %    % Basophils 0.5 %    % Immature Granulocytes 0.3 %    Nucleated RBCs 0 0 /100    Absolute Neutrophil 3.3 1.6 - 8.3 10e9/L    Absolute Lymphocytes 1.9 0.8 - 5.3 10e9/L    Absolute Monocytes 0.4 0.0 - 1.3 10e9/L    Absolute Eosinophils 0.1 0.0 - 0.7 10e9/L    Absolute Basophils 0.0 0.0 - 0.2 10e9/L    Abs Immature Granulocytes 0.0 0 - 0.4 10e9/L    Absolute Nucleated RBC 0.0         Attestation:  I have reviewed today's vital signs, notes, medications, labs and imaging.  Amount of time performed on this consult: 60 minutes.    Prosper Loving MD

## 2021-05-26 ENCOUNTER — APPOINTMENT (OUTPATIENT)
Dept: GENERAL RADIOLOGY | Facility: CLINIC | Age: 66
DRG: 390 | End: 2021-05-26
Attending: PHYSICIAN ASSISTANT
Payer: COMMERCIAL

## 2021-05-26 LAB — POTASSIUM SERPL-SCNC: 4.1 MMOL/L (ref 3.4–5.3)

## 2021-05-26 PROCEDURE — 74018 RADEX ABDOMEN 1 VIEW: CPT

## 2021-05-26 PROCEDURE — 999N000127 HC STATISTIC PERIPHERAL IV START W US GUIDANCE

## 2021-05-26 PROCEDURE — 36415 COLL VENOUS BLD VENIPUNCTURE: CPT | Performed by: INTERNAL MEDICINE

## 2021-05-26 PROCEDURE — 99231 SBSQ HOSP IP/OBS SF/LOW 25: CPT | Performed by: PHYSICIAN ASSISTANT

## 2021-05-26 PROCEDURE — 250N000011 HC RX IP 250 OP 636: Performed by: INTERNAL MEDICINE

## 2021-05-26 PROCEDURE — 84132 ASSAY OF SERUM POTASSIUM: CPT | Performed by: INTERNAL MEDICINE

## 2021-05-26 PROCEDURE — 99231 SBSQ HOSP IP/OBS SF/LOW 25: CPT | Performed by: SURGERY

## 2021-05-26 PROCEDURE — 258N000003 HC RX IP 258 OP 636: Performed by: INTERNAL MEDICINE

## 2021-05-26 PROCEDURE — 250N000009 HC RX 250: Performed by: INTERNAL MEDICINE

## 2021-05-26 PROCEDURE — 120N000001 HC R&B MED SURG/OB

## 2021-05-26 PROCEDURE — 250N000013 HC RX MED GY IP 250 OP 250 PS 637: Performed by: INTERNAL MEDICINE

## 2021-05-26 PROCEDURE — 99232 SBSQ HOSP IP/OBS MODERATE 35: CPT | Performed by: INTERNAL MEDICINE

## 2021-05-26 RX ORDER — ACETAMINOPHEN 325 MG/1
650 TABLET ORAL EVERY 4 HOURS PRN
Status: DISCONTINUED | OUTPATIENT
Start: 2021-05-26 | End: 2021-05-28 | Stop reason: HOSPADM

## 2021-05-26 RX ORDER — ACETAMINOPHEN 650 MG/1
650 SUPPOSITORY RECTAL EVERY 4 HOURS PRN
Status: DISCONTINUED | OUTPATIENT
Start: 2021-05-26 | End: 2021-05-28 | Stop reason: HOSPADM

## 2021-05-26 RX ADMIN — POTASSIUM CHLORIDE, DEXTROSE MONOHYDRATE AND SODIUM CHLORIDE: 150; 5; 450 INJECTION, SOLUTION INTRAVENOUS at 19:03

## 2021-05-26 RX ADMIN — DIATRIZOATE MEGLUMINE AND DIATRIZOATE SODIUM 75 ML: 660; 100 SOLUTION ORAL; RECTAL at 10:01

## 2021-05-26 RX ADMIN — FAMOTIDINE 20 MG: 10 INJECTION, SOLUTION INTRAVENOUS at 09:48

## 2021-05-26 RX ADMIN — FAMOTIDINE 20 MG: 10 INJECTION, SOLUTION INTRAVENOUS at 19:29

## 2021-05-26 RX ADMIN — ONDANSETRON 4 MG: 2 INJECTION INTRAMUSCULAR; INTRAVENOUS at 09:23

## 2021-05-26 RX ADMIN — ACETAMINOPHEN 650 MG: 325 TABLET, FILM COATED ORAL at 19:29

## 2021-05-26 ASSESSMENT — ACTIVITIES OF DAILY LIVING (ADL)
ADLS_ACUITY_SCORE: 11

## 2021-05-26 NOTE — PLAN OF CARE
Pt A&O, sleeping soundly between cares.  Up independent.  Declines pain interventions, denies nausea.  Hypo/faint BS.  Abd distended.  Pt states he is passing some flatus.  IV infusing.  NPO with ice chips.

## 2021-05-26 NOTE — PLAN OF CARE
Pt complains of some abdomen discomfort. Pt complained of having nausea , zofran given with relief. Pt is going to have a gastrografin challenge a 16:30 today.  Pt has been NPO. Pt has been ambulating in the halls.   Pt had a liquid BM this afternoon.

## 2021-05-26 NOTE — PROGRESS NOTES
"[facility]   General Surgery Progress Note          Assessment and Plan:   Assessment:   Recurrent SBO      Plan:   -Continue NPO, ice chips ok  -Gastrografin challenge today  -Adhesion lysis could be considered if fails to resolve  -Discussed low roughage diet, he has been eating a lot of fresh/raw vegetables in an effort to improve his diet.   -Following         Interval History:   Resting in bed, doing OK. Still has some abdominal discomfort. Has passed some more flatus but no BM. Not much of an appetite. No NGT in place this time. +NPO. Voiding independently. He likes the idea of doing a gastrografin challenge before starting a diet.         Physical Exam:   Blood pressure 118/63, pulse (!) 43, temperature 97.6  F (36.4  C), temperature source Temporal, resp. rate 24, height 1.727 m (5' 8\"), weight 81.2 kg (179 lb), SpO2 100 %.    No intake/output data recorded.    General: alert and oriented, no apparent distress  Abdomen: soft, has a fullness in his central/LLQ and is mildly tender to palpation in this area, +BS            Data:     Recent Labs   Lab Test 05/25/21  0634 05/24/21  1027 02/22/18  1522   HGB 13.6 16.5 15.5   WBC 5.8 8.2 10.4     Recent Labs   Lab 05/26/21  0638 05/25/21  0634 05/24/21  1700 05/24/21  1311 05/24/21  1307 05/24/21  1027   NA  --  140  --   --  139 139   POTASSIUM 4.1 4.4 3.8  --  4.2 4.1   CHLORIDE  --  108  --   --  107 106   CO2  --  29  --   --  26 29   ANIONGAP  --  3  --   --  6 4   GLC  --  92  --   --  72 86   BUN  --  10  --   --  15 15   CR  --  0.73  --   --  0.72 0.76   GFRESTIMATED  --  >90  --  >90 >90 >90   GFRESTBLACK  --  >90  --  >90 >90 >90   FREDDY  --  8.0*  --   --  8.8 9.3          Ismael Guevara PA-C  Seen and agree.  Pain free now.  Slightly hungry.  Contrast in colon.  Will start clears.  Mar L Guttormson, MD    "

## 2021-05-26 NOTE — PROGRESS NOTES
[facility]  Hospitalist Progress Note  Dustin Robert MD, MD 05/26/2021  (Text Page)  Reason for Stay (Diagnosis): Small bowel obstruction recurrent         Assessment and Plan:      Summary of Stay:Vladimir Morse is a 65 year old male with background history of prior small bowel obstruction, diverticulitis, nephrolithiasis, prior abdominal surgery for perforated bowel, hypertension, dyslipidemia, Gilbert's syndrome, prior bouts of small bowel obstruction not needing surgical intervention and apparently was on his usual state of health until at least 2 days prior to this hospitalization he started to complains of abdominal pain mostly at the left lower quadrant     1.  Abdominal pain, nausea and vomiting secondary to recurrent small bowel obstruction  2.  Prior history of  Multiple bouts of small bowel obstruction  3.  Previous history of abdominal surgery for perforated bowel during infancy  4.  History of diverticulitis  5.  Hypertension  6.  History of dyslipidemia  7.  Nonobstructive nephrolithiasis     Continue inpatient care.  Still at risk for clinical deterioration remain on n.p.o. status.  IV fluid support.  Patient is ambulatory and encouraged to take his ambulation  Appreciate general surgery input.  -Plan for Gastrografin challenge today  Abdominal films as needed  IV Pepcid  As needed antiemetics      DVT Prophylaxis: Pneumatic Compression Devices  Code Status: Full Code  Discharge Dispo: home  Estimated Disch Date / # of Days until Disch: 2 days          Interval History (Subjective):      Vladimir has always remained in good spirits.  He is very pleasant, conversant.   Denies any worsening abdominal pain.  Passing flatus but no bowel movement.  No reported vomiting.  Still has some abdominal discomfort.  Remain ambulatory.   Afebrile.  Not requiring any oxygen support.  No mental status changes.                  Physical Exam:      Last Vital Signs:  /76 (BP Location: Left arm)  "  Pulse 51   Temp 97.2  F (36.2  C) (Temporal)   Resp 20   Ht 1.727 m (5' 8\")   Wt 81.2 kg (179 lb)   SpO2 100%   BMI 27.22 kg/m      No intake/output data recorded.  Wt Readings from Last 1 Encounters:   05/24/21 81.2 kg (179 lb)     Vitals:    05/24/21 1207 05/24/21 1640   Weight: 80 kg (176 lb 5.9 oz) 81.2 kg (179 lb)       Constitutional: Awake, alert, cooperative, no apparent distress   Respiratory: Clear to auscultation bilaterally, no crackles or wheezing   Cardiovascular: Regular rate and rhythm, normal S1 and S2, and no murmur noted   Abdomen:  Scant bowel sounds, soft, non-distended, still with tenderness at the left upper and lower quadrant   Skin: No rashes, no cyanosis, dry to touch   Neuro: Alert and oriented x3, no weakness, spontaneous and coherent speech   Extremities: No edema, normal range of motion   Other(s): Euthymic mood, not agitated       All other systems: Negative          Medications:      All current medications were reviewed with changes reflected in problem list.         Data:      All new lab and imaging data was reviewed.   Labs:  No results for input(s): CULT in the last 168 hours.  Recent Labs   Lab 05/25/21  0634 05/24/21  1027   WBC 5.8 8.2   HGB 13.6 16.5   HCT 43.0 51.0   MCV 93 91    241     Recent Labs   Lab 05/26/21  0638 05/25/21  0634 05/24/21  1700 05/24/21  1311 05/24/21  1307 05/24/21  1027   NA  --  140  --   --  139 139   POTASSIUM 4.1 4.4 3.8  --  4.2 4.1   CHLORIDE  --  108  --   --  107 106   CO2  --  29  --   --  26 29   ANIONGAP  --  3  --   --  6 4   GLC  --  92  --   --  72 86   BUN  --  10  --   --  15 15   CR  --  0.73  --   --  0.72 0.76   GFRESTIMATED  --  >90  --  >90 >90 >90   GFRESTBLACK  --  >90  --  >90 >90 >90   FREDDY  --  8.0*  --   --  8.8 9.3   PROTTOTAL  --   --   --   --  7.5 7.5   ALBUMIN  --   --   --   --  3.7 4.0   BILITOTAL  --   --   --   --  4.0* 3.8*   ALKPHOS  --   --   --   --  114 111   AST  --   --   --   --  32 20   ALT  " --   --   --   --  39 39     Recent Labs   Lab 05/24/21  1027   SED 5     Recent Labs   Lab 05/25/21  0634 05/24/21  1307 05/24/21  1027   GLC 92 72 86     No results for input(s): INR in the last 168 hours.  No results for input(s): LIPASE in the last 168 hours.  No results for input(s): TROPONIN, TROPI, TROPR in the last 168 hours.    Invalid input(s): TROP, TROPONINIES  No results for input(s): COLOR, APPEARANCE, URINEGLC, URINEBILI, URINEKETONE, SG, UBLD, URINEPH, PROTEIN, UROBILINOGEN, NITRITE, LEUKEST, RBCU, WBCU in the last 168 hours.   Imaging:   Results for orders placed or performed during the hospital encounter of 05/24/21   CT Abdomen Pelvis w Contrast    Narrative    CT ABDOMEN PELVIS WITH CONTRAST 5/24/2021 2:40 PM    CLINICAL HISTORY: Abdominal pain, acute, nonlocalized. Nausea,  abdominal pain, x-ray concerning for small-bowel obstruction.      TECHNIQUE: CT scan of the abdomen and pelvis was performed following  injection of IV contrast. Multiplanar reformats were obtained. Dose  reduction techniques were used.  CONTRAST: 89mL Isovue-370    COMPARISON: 7/27/2012    FINDINGS:   LOWER CHEST: Normal.    HEPATOBILIARY: Indeterminate low-density lesion in the superior right  lobe of the liver measures 1.2 cm (series 3, image 8). This is  unchanged from 2012 and considered benign. There is another similar  lesion in the posterior right lobe (series 3, image 11), also stable.  No new liver lesions or biliary dilation. The gallbladder is  unremarkable.    PANCREAS: Normal.    SPLEEN: Normal.    ADRENAL GLANDS: Normal.    KIDNEYS/BLADDER: Bilateral renal cysts are unchanged. There is  nonobstructing nephrolithiasis with 2 stones in the right kidney  measuring up to 4 mm and 2 stones in the left kidney measuring up to 6  mm. No hydronephrosis or hydroureter. Urinary bladder unremarkable.    BOWEL: Multiple dilated loops of small bowel measure up to 4.6 cm.  There is abrupt transition to nondilated small bowel  in the right  lower quadrant. The colon is not dilated. There is sigmoid  diverticulosis without diverticulitis. No free air or intra-abdominal  abscess. A small amount of ascites is present.    PELVIC ORGANS: Mild lobular enlargement of the prostate.    ADDITIONAL FINDINGS: No lymphadenopathy.    MUSCULOSKELETAL: No destructive bone lesions.      Impression    IMPRESSION:   1.  Dilated small bowel with abrupt transition point in the right  lower quadrant compatible with mechanical obstruction. No free air.  Trace ascites. No abscess.  2.  Nonobstructing bilateral nephrolithiasis.    LOLITA MORIN MD

## 2021-05-26 NOTE — PLAN OF CARE
"/77 (BP Location: Left arm)   Pulse 98   Temp 95.8  F (35.4  C) (Temporal)   Resp 24   Ht 1.727 m (5' 8\")   Wt 81.2 kg (179 lb)   SpO2 100%   BMI 27.22 kg/m    Orientation: A&Ox4  Resp: LS Clear, RA  Pain: Managed with IV dilaudid x1 this shift.  Activity: Very motivated to get better walking hallways several times this shift.  Diet: NPO with Ice chips, hypoactive BS, abd distended, passing flatus, NO BM this shift.  Voiding: spontaneously without difficulty  Discharge Plan: Home in 1- 2 days or when medically stable.     "

## 2021-05-27 ENCOUNTER — APPOINTMENT (OUTPATIENT)
Dept: GENERAL RADIOLOGY | Facility: CLINIC | Age: 66
DRG: 390 | End: 2021-05-27
Attending: PHYSICIAN ASSISTANT
Payer: COMMERCIAL

## 2021-05-27 LAB
ANION GAP SERPL CALCULATED.3IONS-SCNC: 2 MMOL/L (ref 3–14)
BASOPHILS # BLD AUTO: 0 10E9/L (ref 0–0.2)
BASOPHILS NFR BLD AUTO: 0.7 %
BUN SERPL-MCNC: 5 MG/DL (ref 7–30)
CALCIUM SERPL-MCNC: 8.5 MG/DL (ref 8.5–10.1)
CHLORIDE SERPL-SCNC: 110 MMOL/L (ref 94–109)
CO2 SERPL-SCNC: 30 MMOL/L (ref 20–32)
CREAT SERPL-MCNC: 0.69 MG/DL (ref 0.66–1.25)
DIFFERENTIAL METHOD BLD: NORMAL
EOSINOPHIL # BLD AUTO: 0.1 10E9/L (ref 0–0.7)
EOSINOPHIL NFR BLD AUTO: 2.4 %
ERYTHROCYTE [DISTWIDTH] IN BLOOD BY AUTOMATED COUNT: 13.1 % (ref 10–15)
GFR SERPL CREATININE-BSD FRML MDRD: >90 ML/MIN/{1.73_M2}
GLUCOSE SERPL-MCNC: 82 MG/DL (ref 70–99)
HCT VFR BLD AUTO: 44 % (ref 40–53)
HGB BLD-MCNC: 14.3 G/DL (ref 13.3–17.7)
IMM GRANULOCYTES # BLD: 0 10E9/L (ref 0–0.4)
IMM GRANULOCYTES NFR BLD: 0.2 %
LYMPHOCYTES # BLD AUTO: 1.7 10E9/L (ref 0.8–5.3)
LYMPHOCYTES NFR BLD AUTO: 31.5 %
MCH RBC QN AUTO: 29.9 PG (ref 26.5–33)
MCHC RBC AUTO-ENTMCNC: 32.5 G/DL (ref 31.5–36.5)
MCV RBC AUTO: 92 FL (ref 78–100)
MONOCYTES # BLD AUTO: 0.4 10E9/L (ref 0–1.3)
MONOCYTES NFR BLD AUTO: 7.8 %
NEUTROPHILS # BLD AUTO: 3.2 10E9/L (ref 1.6–8.3)
NEUTROPHILS NFR BLD AUTO: 57.4 %
NRBC # BLD AUTO: 0 10*3/UL
NRBC BLD AUTO-RTO: 0 /100
PLATELET # BLD AUTO: 191 10E9/L (ref 150–450)
POTASSIUM SERPL-SCNC: 4.1 MMOL/L (ref 3.4–5.3)
RBC # BLD AUTO: 4.79 10E12/L (ref 4.4–5.9)
SODIUM SERPL-SCNC: 142 MMOL/L (ref 133–144)
WBC # BLD AUTO: 5.5 10E9/L (ref 4–11)

## 2021-05-27 PROCEDURE — 250N000013 HC RX MED GY IP 250 OP 250 PS 637: Performed by: INTERNAL MEDICINE

## 2021-05-27 PROCEDURE — 36415 COLL VENOUS BLD VENIPUNCTURE: CPT | Performed by: INTERNAL MEDICINE

## 2021-05-27 PROCEDURE — 258N000003 HC RX IP 258 OP 636: Performed by: INTERNAL MEDICINE

## 2021-05-27 PROCEDURE — 99232 SBSQ HOSP IP/OBS MODERATE 35: CPT | Performed by: SURGERY

## 2021-05-27 PROCEDURE — 120N000001 HC R&B MED SURG/OB

## 2021-05-27 PROCEDURE — 99233 SBSQ HOSP IP/OBS HIGH 50: CPT | Performed by: HOSPITALIST

## 2021-05-27 PROCEDURE — 85025 COMPLETE CBC W/AUTO DIFF WBC: CPT | Performed by: INTERNAL MEDICINE

## 2021-05-27 PROCEDURE — 74018 RADEX ABDOMEN 1 VIEW: CPT

## 2021-05-27 PROCEDURE — 80048 BASIC METABOLIC PNL TOTAL CA: CPT | Performed by: INTERNAL MEDICINE

## 2021-05-27 RX ORDER — FAMOTIDINE 20 MG/1
20 TABLET, FILM COATED ORAL 2 TIMES DAILY
Status: DISCONTINUED | OUTPATIENT
Start: 2021-05-27 | End: 2021-05-28 | Stop reason: HOSPADM

## 2021-05-27 RX ADMIN — FAMOTIDINE 20 MG: 20 TABLET ORAL at 11:37

## 2021-05-27 RX ADMIN — FAMOTIDINE 20 MG: 20 TABLET ORAL at 19:38

## 2021-05-27 RX ADMIN — POTASSIUM CHLORIDE, DEXTROSE MONOHYDRATE AND SODIUM CHLORIDE: 150; 5; 450 INJECTION, SOLUTION INTRAVENOUS at 04:07

## 2021-05-27 ASSESSMENT — ACTIVITIES OF DAILY LIVING (ADL)
ADLS_ACUITY_SCORE: 11

## 2021-05-27 NOTE — PROGRESS NOTES
"[facility]   General Surgery Progress Note          Assessment and Plan:   Assessment:   Recurrent SBO - resolving  Gastrogafin challenge showed contrast in colon, +BMs      Plan:   -Diet: full liquids  -Increase activity as tolerated  -Disposition: Could potentially discharge tonight vs tomorrow if tolerates diet advancement. In the short term he should continue soft foods and full liquids for about 1-2 weeks. We discussed long term diet changes (low roughage).         Interval History:   Resting in bed. Feels better today. He has had a few loose BMs. Tolerating clear liquids since yesterday. Appetite is increasing. He denies pain and bloating. He is up walking. He wants to go home today.         Physical Exam:   Blood pressure (!) 151/82, pulse 53, temperature 97.6  F (36.4  C), temperature source Temporal, resp. rate 16, height 1.727 m (5' 8\"), weight 81.2 kg (179 lb), SpO2 100 %.    I/O last 3 completed shifts:  In: 4921 [I.V.:4921]  Out: -     General: alert and oriented, no apparent distress  Abdomen: soft, non-tender, non-distended, +BS            Data:     Recent Labs   Lab Test 05/25/21  0634 05/24/21  1027 02/22/18  1522   HGB 13.6 16.5 15.5   WBC 5.8 8.2 10.4     Recent Labs   Lab 05/27/21  0653 05/26/21  0638 05/25/21  0634 05/24/21  1311 05/24/21  1311 05/24/21  1307     --  140  --   --  139   POTASSIUM 4.1 4.1 4.4   < >  --  4.2   CHLORIDE 110*  --  108  --   --  107   CO2 30  --  29  --   --  26   ANIONGAP 2*  --  3  --   --  6   GLC 82  --  92  --   --  72   BUN 5*  --  10  --   --  15   CR 0.69  --  0.73  --   --  0.72   GFRESTIMATED >90  --  >90  --  >90 >90   GFRESTBLACK >90  --  >90  --  >90 >90   FREDDY 8.5  --  8.0*  --   --  8.8    < > = values in this interval not displayed.          Max Ismael, PA-C      As above, migue clears and passing gas and some liquid stool  would like to try soft diet for supper, possible discharge tomorrow  Prosper Loving MD  5/27/2021 1:18 PM    "

## 2021-05-27 NOTE — PLAN OF CARE
Pt is A/Ox4, VS monitored. Denied pain. Voiding. Loose/ watery BMs overnight. Abdomen tender. IV infusing. Tolerating clears. Up independently. Pt slept well. Plan @ discharge is home. Will continue POC.

## 2021-05-27 NOTE — PLAN OF CARE
PM SHIFT  Pt alert and orientated. Pt up in room ind. Pt  tolerated his first meal this evening of mech soft. Pt abd soft and nontender,  BS hyperactive, had Bm on days shift. Pt stated he was dizzy on other shifts when up but denied this shift and feels it was because he did not eat for 5 days. Pt HR low at the beginning of the shift but pt states he was asleep prior to his VS being checked. HR in 50-60s when awake and ortho BP checked and BP and HR good and denied dizziness. Home medications not resumed yet. Left message for rounder to address tomorrow under the sticky note for physician. Pt plans to discharge home tomorrow.

## 2021-05-27 NOTE — PLAN OF CARE
Pt  has been tolerating full liquids and will advance to soft diet this evening.  Pt is planning to discharge to home tomorrow.

## 2021-05-27 NOTE — PROGRESS NOTES
[facility]    Hospitalist Progress Note    Date of Service (when I saw the patient): 05/27/2021  Provider:  Abad Causey MD   Text Page  7am - 6PM       Assessment & Plan   Summary of Stay:Vladimir Morse is a 65 year old male with background history of prior small bowel obstruction, diverticulitis, nephrolithiasis, prior abdominal surgery for perforated bowel, hypertension, dyslipidemia, Gilbert's syndrome, prior bouts of small bowel obstruction not needing surgical intervention and apparently was on his usual state of health until at least 2 days prior to this hospitalization he started to complains of abdominal pain mostly at the left lower quadrant     1.  Recurrent small bowel obstruction with abdominal pain, nausea and vomiting   2.  Prior history of  Multiple bouts of small bowel obstruction  3.  Previous history of abdominal surgery for perforated bowel during infancy  4.  History of diverticulitis  5.  Hypertension  6.  History of dyslipidemia  7.  Nonobstructive nephrolithiasis      Plan  Inpatient care.   ADAT  Encouraged ambulation  Appreciate general surgery input.  IV Pepcid  Antiemetics prn      DVT Prophylaxis: Pneumatic Compression Devices  Code Status: Full Code    Disposition: Expected discharge in 24 hours.    Interval History   Better, denies pain, N/V. Bowel sounds and tolerance to FLD    -Data reviewed today: I reviewed all new labs and imaging results over the last 24 hours.    Physical Exam   Temp: 97.6  F (36.4  C) Temp src: Temporal BP: 136/83 Pulse: 51   Resp: 16 SpO2: 100 % O2 Device: None (Room air)    Vitals:    05/24/21 1207 05/24/21 1640   Weight: 80 kg (176 lb 5.9 oz) 81.2 kg (179 lb)     Vital Signs with Ranges  Temp:  [96.8  F (36  C)-98.4  F (36.9  C)] 97.6  F (36.4  C)  Pulse:  [44-53] 51  Resp:  [12-20] 16  BP: (121-151)/(65-83) 136/83  SpO2:  [99 %-100 %] 100 %  I/O last 3 completed shifts:  In: 4921 [I.V.:4921]  Out: -     GEN:  Alert, oriented x 3,  appears comfortable, NAD.  HEENT:  Normocephalic/atraumatic, no scleral icterus, no nasal discharge, mouth moist.  CV:  Regular rate and rhythm, no murmur or JVD.  S1 + S2 noted, no S3 or S4.  LUNGS:  Clear to auscultation bilaterally without rales/rhonchi/wheezing/retractions.  Symmetric chest rise on inhalation noted.  ABD:  Active bowel sounds, soft, non-tender/non-distended.  No rebound/guarding/rigidity.  EXT:  No edema or cyanosis.  No joint synovitis noted.  SKIN:  Dry to touch, no exanthems noted in the visualized areas.       Medications     dextrose 5% and 0.45% NaCl + KCl 20 mEq/L 100 mL/hr at 05/27/21 0407       famotidine  20 mg Oral BID     sodium chloride (PF)  3 mL Intracatheter Q8H       Data   Recent Labs   Lab 05/27/21  0653 05/26/21  0638 05/25/21  0634 05/24/21  1307 05/24/21  1307 05/24/21  1027   WBC 5.5  --  5.8  --   --  8.2   HGB 14.3  --  13.6  --   --  16.5   MCV 92  --  93  --   --  91     --  199  --   --  241     --  140  --  139 139   POTASSIUM 4.1 4.1 4.4   < > 4.2 4.1   CHLORIDE 110*  --  108  --  107 106   CO2 30  --  29  --  26 29   BUN 5*  --  10  --  15 15   CR 0.69  --  0.73  --  0.72 0.76   ANIONGAP 2*  --  3  --  6 4   FREDDY 8.5  --  8.0*  --  8.8 9.3   GLC 82  --  92  --  72 86   ALBUMIN  --   --   --   --  3.7 4.0   PROTTOTAL  --   --   --   --  7.5 7.5   BILITOTAL  --   --   --   --  4.0* 3.8*   ALKPHOS  --   --   --   --  114 111   ALT  --   --   --   --  39 39   AST  --   --   --   --  32 20    < > = values in this interval not displayed.       Recent Results (from the past 24 hour(s))   XR Gastrografin  Challenge    Narrative    GASTROGRAFIN CHALLENGE  5/26/2021 4:37 PM     HISTORY: Recurrent small bowel obstruction.    COMPARISON: May 24, 2021      Impression    IMPRESSION: Contrast present in the colon. Multiple loops of dilated  bowel centrally noted.     PETER DYE MD   XR Abdomen 1 View    Narrative    ABDOMEN ONE VIEW  5/27/2021 7:56 AM     HISTORY:  Recurrent small bowel obstruction.    COMPARISON: May 26, 2021 at 1634 hours      Impression    IMPRESSION: Contrast present in the colon. Improved bowel distention  from previous.    PETER DYE MD     Disclaimer: This note consists of symbols derived from keyboarding, dictation and/or voice recognition software. As a result, there may be errors in the script that have gone undetected. Please consider this when interpreting information found in this chart.

## 2021-05-27 NOTE — PROVIDER NOTIFICATION
Paged admitting provider at [phone]:  Pt c/o headache.  Could we get order for Tylenol or similar?  Thank you

## 2021-05-27 NOTE — PLAN OF CARE
Evening RN    Patient vital signs are at baseline: Yes  Patient able to ambulate as they were prior to admission or with assist devices provided by therapies during their stay:  Yes  Patient MUST void prior to discharge:  Yes  Patient able to tolerate oral intake:  Yes  Pain has adequate pain control using Oral analgesics:  Yes    Pt A/O x4.  VSS and afebrile.  Pain managed adequately with prn PO Tylenol (pt reported headache this shift which improved with tylenol administration).  CMS intact.  Skin wdl.  Abdomen soft, slightly distended and slightly tender.  Pt reports having several small loose bms this shift and passing gas.  Denied nausea.  Up independently. Voiding adequately.  Tolerating clear liquid diet well after XR Gastrografin Challenge this afternoon.  IV fluids infusing per MAR order.  Plan is home on discharge with wife.  Will continue to monitor.

## 2021-05-28 VITALS
HEART RATE: 68 BPM | TEMPERATURE: 97.2 F | DIASTOLIC BLOOD PRESSURE: 76 MMHG | BODY MASS INDEX: 27.13 KG/M2 | RESPIRATION RATE: 18 BRPM | OXYGEN SATURATION: 100 % | HEIGHT: 68 IN | SYSTOLIC BLOOD PRESSURE: 124 MMHG | WEIGHT: 179 LBS

## 2021-05-28 LAB — POTASSIUM SERPL-SCNC: 4.3 MMOL/L (ref 3.4–5.3)

## 2021-05-28 PROCEDURE — 99238 HOSP IP/OBS DSCHRG MGMT 30/<: CPT | Performed by: HOSPITALIST

## 2021-05-28 PROCEDURE — 36415 COLL VENOUS BLD VENIPUNCTURE: CPT | Performed by: INTERNAL MEDICINE

## 2021-05-28 PROCEDURE — 250N000013 HC RX MED GY IP 250 OP 250 PS 637: Performed by: INTERNAL MEDICINE

## 2021-05-28 PROCEDURE — 99231 SBSQ HOSP IP/OBS SF/LOW 25: CPT | Performed by: PHYSICIAN ASSISTANT

## 2021-05-28 PROCEDURE — 84132 ASSAY OF SERUM POTASSIUM: CPT | Performed by: INTERNAL MEDICINE

## 2021-05-28 PROCEDURE — 250N000013 HC RX MED GY IP 250 OP 250 PS 637: Performed by: PHYSICIAN ASSISTANT

## 2021-05-28 RX ORDER — POLYETHYLENE GLYCOL 3350 17 G/17G
17 POWDER, FOR SOLUTION ORAL ONCE
Status: COMPLETED | OUTPATIENT
Start: 2021-05-28 | End: 2021-05-28

## 2021-05-28 RX ADMIN — POLYETHYLENE GLYCOL 3350 17 G: 17 POWDER, FOR SOLUTION ORAL at 10:37

## 2021-05-28 RX ADMIN — FAMOTIDINE 20 MG: 20 TABLET ORAL at 09:23

## 2021-05-28 ASSESSMENT — ACTIVITIES OF DAILY LIVING (ADL)
ADLS_ACUITY_SCORE: 11

## 2021-05-28 NOTE — DISCHARGE SUMMARY
[facility]    Discharge Summary  Hospitalist    Date of Admission:  5/24/2021  Date of Discharge:  5/28/2021  Provider:  Abad Causey MD  Date of Service (when I last saw the patient): 05/28/21    Discharge Diagnoses   1.  Recurrent small bowel obstruction from adhesions  2.  Acute  abdominal pain, nausea and vomiting secondary to #1.  3.  History of Multiple bouts of small bowel obstruction  4.  Previous history of abdominal surgery for perforated bowel during infancy  5.  History of diverticulitis  6.  Essential Hypertension  7.  History of dyslipidemia  8.  Nonobstructive nephrolithiasis      Other medical issues:  Past Medical History:   Diagnosis Date     BPPV (benign paroxysmal positional vertigo), unspecified laterality 7/3/2015     Cerebral aneurysm 8/4/2011    atherosclerotic     DIVERTICULITIS OF COLON W/O BLEED 6/29/2006     Elevated blood pressure reading without diagnosis of hypertension 7/22/2018     Essential hypertension, benign 2/22/2018     Gilbert's syndrome 12/13/2016     Hyperlipidemia LDL goal <100 11/28/2014     Knee pain 2/6/2009     Lipoma of skin and subcutaneous tissue 6/22/2012     Nephrolithiasis 6/22/2012     Pain in joint, shoulder region 9/18/2010     Presbycusis 6/22/2012     SBO (small bowel obstruction) (H) 7/27/2012     Subungual hematoma of right foot, initial encounter 7/22/2018     TIA (transient ischaemic attack)      Unspecified intestinal obstruction     diverticulitis     Vasculogenic erectile dysfunction 5/16/2019       History of Present Illness   Vladimir Morse is an 65 year old male who presented with abdomen pain.  Please see the admission history and physical for full details.    Hospital Course   Summary of Stay:Vladimir Morse is a 65 year old male with background history of prior small bowel obstruction, diverticulitis, nephrolithiasis, prior abdominal surgery for perforated bowel, hypertension, dyslipidemia, Gilbert's syndrome, prior  bouts of small bowel obstruction not needing surgical intervention and apparently was on his usual state of health until at least 2 days prior to this hospitalization he started to complains of abdominal pain mostly at the left lower quadrant. He has been diagnosed with SBO and treated successfully with conservative treatment.   He has been followed along by General Surgery   1.  Recurrent small bowel obstruction with abdominal pain, nausea and vomiting   2.  Prior history of Multiple bouts of small bowel obstruction  3.  Previous history of abdominal surgery for perforated bowel during infancy  4.  History of diverticulitis  5.  Hypertension  6.  History of dyslipidemia  7.  Nonobstructive nephrolithiasis     # Discharge Pain Plan:    - Patient currently has NO PAIN and is not being prescribed pain medications on discharge.      Significant Results and Procedures   See below     Pending Results      Unresulted Labs Ordered in the Past 30 Days of this Admission     No orders found from 4/24/2021 to 5/25/2021.          Code Status   Full Code       Primary Care Physician   Reyes Hinkle    GEN:  Alert, oriented x 3, appears comfortable, NAD.  HEENT:  Normocephalic/atraumatic, no scleral icterus, no nasal discharge, mouth moist.  CV:  Regular rate and rhythm, no murmur or JVD.  S1 + S2 noted, no S3 or S4.  LUNGS:  Clear to auscultation bilaterally without rales/rhonchi/wheezing/retractions.  Symmetric chest rise on inhalation noted.  ABD:  Active bowel sounds, soft, non-tender/non-distended.  No rebound/guarding/rigidity.  EXT:  No edema or cyanosis.  No joint synovitis noted.  SKIN:  Dry to touch, no exanthems noted in the visualized areas.     Discharge Disposition   Discharged to home    Consultations This Hospital Stay   SURGERY GENERAL IP CONSULT    Time Spent on this Encounter   I, Abad Causey MD, personally saw the patient today and spent greater than 30 minutes discharging this patient.     Discharge  Orders      Reason for your hospital stay    SBO     Follow-up and recommended labs and tests     Follow up with primary care provider, Reyes Hinkle, within 7 days for hospital follow- up.  No follow up labs or test are needed.     Activity    Your activity upon discharge: activity as tolerated     Full Code     Diet    Follow this diet upon discharge:  Low reissue x weeks and follow up surgical; recommendation after     Discharge Medications   Current Discharge Medication List      CONTINUE these medications which have NOT CHANGED    Details   aspirin 81 MG EC tablet Take 81 mg by mouth daily.        atorvastatin (LIPITOR) 20 MG tablet Take 1 tablet (20 mg) by mouth daily  Qty: 90 tablet, Refills: 3    Associated Diagnoses: Cerebral aneurysm; Pure hypercholesterolemia      ibuprofen (ADVIL/MOTRIN) 200 MG tablet Take 200 mg by mouth as needed for mild pain      ramipril (ALTACE) 10 MG capsule TAKE ONE CAPSULE BY MOUTH EVERY DAY  Qty: 90 capsule, Refills: 0    Comments: Patient needs lab and BP check for further refills.  Associated Diagnoses: Essential hypertension, benign      sildenafil (VIAGRA) 100 MG tablet Take 1 tablet (100 mg) by mouth daily as needed  Qty: 6 tablet, Refills: 3    Associated Diagnoses: Vasculogenic erectile dysfunction, unspecified vasculogenic erectile dysfunction type           Allergies   Allergies   Allergen Reactions     No Known Drug Allergies      Data   Most Recent 3 CBC's:  Recent Labs   Lab Test 05/27/21  0653 05/25/21  0634 05/24/21  1027   WBC 5.5 5.8 8.2   HGB 14.3 13.6 16.5   MCV 92 93 91    199 241      Most Recent 3 BMP's:  Recent Labs   Lab Test 05/28/21  0746 05/27/21  0653 05/26/21  0638 05/25/21  0634 05/24/21  1307 05/24/21  1307   NA  --  142  --  140  --  139   POTASSIUM 4.3 4.1 4.1 4.4   < > 4.2   CHLORIDE  --  110*  --  108  --  107   CO2  --  30  --  29  --  26   BUN  --  5*  --  10  --  15   CR  --  0.69  --  0.73  --  0.72   ANIONGAP  --  2*  --  3  --  6    FREDDY  --  8.5  --  8.0*  --  8.8   GLC  --  82  --  92  --  72    < > = values in this interval not displayed.     Most Recent 2 LFT's:  Recent Labs   Lab Test 05/24/21  1307 05/24/21  1027   AST 32 20   ALT 39 39   ALKPHOS 114 111   BILITOTAL 4.0* 3.8*     Most Recent INR's and Anticoagulation Dosing History:  Anticoagulation Dose History     There is no flowsheet data to display.        Most Recent 3 Troponin's:No lab results found.  Most Recent Cholesterol Panel:  Recent Labs   Lab Test 10/05/20  0914   CHOL 113   LDL 46   HDL 53   TRIG 70     Most Recent 6 Bacteria Isolates From Any Culture (See EPIC Reports for Culture Details):No lab results found.  Most Recent TSH, T4 and A1c Labs:  Recent Labs   Lab Test 05/16/19  1018 09/03/13  1456 09/03/13  1456   TSH 0.82   < > 1.34   A1C  --   --  5.3    < > = values in this interval not displayed.     Results for orders placed or performed during the hospital encounter of 05/24/21   CT Abdomen Pelvis w Contrast    Narrative    CT ABDOMEN PELVIS WITH CONTRAST 5/24/2021 2:40 PM    CLINICAL HISTORY: Abdominal pain, acute, nonlocalized. Nausea,  abdominal pain, x-ray concerning for small-bowel obstruction.      TECHNIQUE: CT scan of the abdomen and pelvis was performed following  injection of IV contrast. Multiplanar reformats were obtained. Dose  reduction techniques were used.  CONTRAST: 89mL Isovue-370    COMPARISON: 7/27/2012    FINDINGS:   LOWER CHEST: Normal.    HEPATOBILIARY: Indeterminate low-density lesion in the superior right  lobe of the liver measures 1.2 cm (series 3, image 8). This is  unchanged from 2012 and considered benign. There is another similar  lesion in the posterior right lobe (series 3, image 11), also stable.  No new liver lesions or biliary dilation. The gallbladder is  unremarkable.    PANCREAS: Normal.    SPLEEN: Normal.    ADRENAL GLANDS: Normal.    KIDNEYS/BLADDER: Bilateral renal cysts are unchanged. There is  nonobstructing  nephrolithiasis with 2 stones in the right kidney  measuring up to 4 mm and 2 stones in the left kidney measuring up to 6  mm. No hydronephrosis or hydroureter. Urinary bladder unremarkable.    BOWEL: Multiple dilated loops of small bowel measure up to 4.6 cm.  There is abrupt transition to nondilated small bowel in the right  lower quadrant. The colon is not dilated. There is sigmoid  diverticulosis without diverticulitis. No free air or intra-abdominal  abscess. A small amount of ascites is present.    PELVIC ORGANS: Mild lobular enlargement of the prostate.    ADDITIONAL FINDINGS: No lymphadenopathy.    MUSCULOSKELETAL: No destructive bone lesions.      Impression    IMPRESSION:   1.  Dilated small bowel with abrupt transition point in the right  lower quadrant compatible with mechanical obstruction. No free air.  Trace ascites. No abscess.  2.  Nonobstructing bilateral nephrolithiasis.    LOLITA MORIN MD   XR Gastrografin  Challenge    Narrative    GASTROGRAFIN CHALLENGE  5/26/2021 4:37 PM     HISTORY: Recurrent small bowel obstruction.    COMPARISON: May 24, 2021      Impression    IMPRESSION: Contrast present in the colon. Multiple loops of dilated  bowel centrally noted.     PETER DYE MD   XR Abdomen 1 View    Narrative    ABDOMEN ONE VIEW  5/27/2021 7:56 AM     HISTORY: Recurrent small bowel obstruction.    COMPARISON: May 26, 2021 at 1634 hours      Impression    IMPRESSION: Contrast present in the colon. Improved bowel distention  from previous.    PETER DYE MD         Disclaimer: This note consists of symbols derived from keyboarding, dictation and/or voice recognition software. As a result, there may be errors in the script that have gone undetected. Please consider this when interpreting information found in this chart.

## 2021-05-28 NOTE — PLAN OF CARE
Pt up in room indep. Passing flatus. Small bm. Denies pain. All discharge education completed with wife at bedside. Also gave dietary recommendations for low residual, mechanical soft diet. All questions answered. No concerns. All belongings collected and sent home with pt. Wife to transport pt home. Adequate for discharge.

## 2021-05-28 NOTE — PROGRESS NOTES
"[facility]   General Surgery Progress Note          Assessment and Plan:   Assessment:   Recurrent SBO - resolving  Gastrogafin challenge showed contrast in colon, +BMs      Plan:   Try Miralax now  Likely discharge later today if continues to tolerate soft diet.  Recommend soft/full liquid diet for about 1-2 weeks, and low roughage diet indefinitely.         Interval History:   Feels well, tolerating soft diet. Bloating has much improved, and passing flatus. Interested in going home but concerned that he has not had substantial BM yet.           Physical Exam:   Blood pressure 124/76, pulse 68, temperature 97.2  F (36.2  C), temperature source Temporal, resp. rate 18, height 1.727 m (5' 8\"), weight 81.2 kg (179 lb), SpO2 100 %.    I/O last 3 completed shifts:  In: 490 [P.O.:490]  Out: -     General: alert and oriented, no apparent distress  Abdomen: soft, non-tender, non-distended, +BS            Data:     Recent Labs   Lab Test 05/25/21  0634 05/24/21  1027 02/22/18  1522   HGB 13.6 16.5 15.5   WBC 5.8 8.2 10.4     Recent Labs   Lab 05/28/21  0746 05/27/21  0653 05/26/21  0638 05/25/21  0634 05/24/21  1311 05/24/21  1311 05/24/21  1307   NA  --  142  --  140  --   --  139   POTASSIUM 4.3 4.1 4.1 4.4   < >  --  4.2   CHLORIDE  --  110*  --  108  --   --  107   CO2  --  30  --  29  --   --  26   ANIONGAP  --  2*  --  3  --   --  6   GLC  --  82  --  92  --   --  72   BUN  --  5*  --  10  --   --  15   CR  --  0.69  --  0.73  --   --  0.72   GFRESTIMATED  --  >90  --  >90  --  >90 >90   GFRESTBLACK  --  >90  --  >90  --  >90 >90   FREDDY  --  8.5  --  8.0*  --   --  8.8    < > = values in this interval not displayed.          Luz Marina Beasley PA-C      "

## 2021-05-28 NOTE — PROGRESS NOTES
Pt is alert and oriented, lung sounds clear. Up independently,Tolerating mech. Soft diet. Passing gas, hyperactive bowel sounds. Denies pain. Saline lock.Voiding adequate amounts. Plan to discharge home today.

## 2021-05-31 DIAGNOSIS — I10 ESSENTIAL HYPERTENSION, BENIGN: ICD-10-CM

## 2021-06-01 ENCOUNTER — PATIENT OUTREACH (OUTPATIENT)
Dept: FAMILY MEDICINE | Facility: CLINIC | Age: 66
End: 2021-06-01

## 2021-06-01 ENCOUNTER — TELEPHONE (OUTPATIENT)
Dept: FAMILY MEDICINE | Facility: CLINIC | Age: 66
End: 2021-06-01

## 2021-06-01 NOTE — TELEPHONE ENCOUNTER
Inpatient discharge from [facility] on 5/28/2021 Small Bowel Obstruction (H)    Mera Franz/

## 2021-06-03 ENCOUNTER — APPOINTMENT (OUTPATIENT)
Dept: CT IMAGING | Facility: CLINIC | Age: 66
End: 2021-06-03
Attending: EMERGENCY MEDICINE
Payer: COMMERCIAL

## 2021-06-03 ENCOUNTER — HOSPITAL ENCOUNTER (EMERGENCY)
Facility: CLINIC | Age: 66
Discharge: HOME | End: 2021-06-03
Attending: EMERGENCY MEDICINE | Admitting: EMERGENCY MEDICINE
Payer: COMMERCIAL

## 2021-06-03 ENCOUNTER — OFFICE VISIT (OUTPATIENT)
Dept: FAMILY MEDICINE | Facility: CLINIC | Age: 66
End: 2021-06-03
Payer: COMMERCIAL

## 2021-06-03 VITALS
SYSTOLIC BLOOD PRESSURE: 126 MMHG | OXYGEN SATURATION: 100 % | TEMPERATURE: 98.9 F | RESPIRATION RATE: 18 BRPM | DIASTOLIC BLOOD PRESSURE: 82 MMHG | HEART RATE: 77 BPM

## 2021-06-03 VITALS
RESPIRATION RATE: 16 BRPM | SYSTOLIC BLOOD PRESSURE: 132 MMHG | WEIGHT: 175 LBS | DIASTOLIC BLOOD PRESSURE: 79 MMHG | HEART RATE: 61 BPM | BODY MASS INDEX: 26.61 KG/M2 | OXYGEN SATURATION: 97 % | TEMPERATURE: 97.8 F

## 2021-06-03 DIAGNOSIS — R10.9 LEFT SIDED ABDOMINAL PAIN: ICD-10-CM

## 2021-06-03 DIAGNOSIS — K56.7 ILEUS (H): ICD-10-CM

## 2021-06-03 DIAGNOSIS — Z12.11 SPECIAL SCREENING FOR MALIGNANT NEOPLASMS, COLON: ICD-10-CM

## 2021-06-03 DIAGNOSIS — I10 ESSENTIAL HYPERTENSION, BENIGN: ICD-10-CM

## 2021-06-03 DIAGNOSIS — K56.609 SMALL BOWEL OBSTRUCTION (H): ICD-10-CM

## 2021-06-03 DIAGNOSIS — K56.690 OTHER PARTIAL INTESTINAL OBSTRUCTION (H): Primary | ICD-10-CM

## 2021-06-03 LAB
ANION GAP SERPL CALCULATED.3IONS-SCNC: 3 MMOL/L (ref 3–14)
BASOPHILS # BLD AUTO: 0.1 10E9/L (ref 0–0.2)
BASOPHILS NFR BLD AUTO: 1 %
BUN SERPL-MCNC: 11 MG/DL (ref 7–30)
CALCIUM SERPL-MCNC: 9.1 MG/DL (ref 8.5–10.1)
CHLORIDE SERPL-SCNC: 108 MMOL/L (ref 94–109)
CO2 SERPL-SCNC: 28 MMOL/L (ref 20–32)
CREAT SERPL-MCNC: 0.72 MG/DL (ref 0.66–1.25)
DIFFERENTIAL METHOD BLD: NORMAL
EOSINOPHIL # BLD AUTO: 0.1 10E9/L (ref 0–0.7)
EOSINOPHIL NFR BLD AUTO: 1.5 %
ERYTHROCYTE [DISTWIDTH] IN BLOOD BY AUTOMATED COUNT: 13.1 % (ref 10–15)
GFR SERPL CREATININE-BSD FRML MDRD: >90 ML/MIN/{1.73_M2}
GLUCOSE SERPL-MCNC: 93 MG/DL (ref 70–99)
HCT VFR BLD AUTO: 47.2 % (ref 40–53)
HGB BLD-MCNC: 15 G/DL (ref 13.3–17.7)
IMM GRANULOCYTES # BLD: 0 10E9/L (ref 0–0.4)
IMM GRANULOCYTES NFR BLD: 0.4 %
LACTATE BLD-SCNC: 1.2 MMOL/L (ref 0.7–2)
LYMPHOCYTES # BLD AUTO: 1.8 10E9/L (ref 0.8–5.3)
LYMPHOCYTES NFR BLD AUTO: 34.5 %
MCH RBC QN AUTO: 29 PG (ref 26.5–33)
MCHC RBC AUTO-ENTMCNC: 31.8 G/DL (ref 31.5–36.5)
MCV RBC AUTO: 91 FL (ref 78–100)
MONOCYTES # BLD AUTO: 0.3 10E9/L (ref 0–1.3)
MONOCYTES NFR BLD AUTO: 6.4 %
NEUTROPHILS # BLD AUTO: 2.9 10E9/L (ref 1.6–8.3)
NEUTROPHILS NFR BLD AUTO: 56.2 %
NRBC # BLD AUTO: 0 10*3/UL
NRBC BLD AUTO-RTO: 0 /100
PLATELET # BLD AUTO: 252 10E9/L (ref 150–450)
POTASSIUM SERPL-SCNC: 4.2 MMOL/L (ref 3.4–5.3)
RBC # BLD AUTO: 5.17 10E12/L (ref 4.4–5.9)
SODIUM SERPL-SCNC: 139 MMOL/L (ref 133–144)
WBC # BLD AUTO: 5.2 10E9/L (ref 4–11)

## 2021-06-03 PROCEDURE — 258N000003 HC RX IP 258 OP 636: Performed by: EMERGENCY MEDICINE

## 2021-06-03 PROCEDURE — 99285 EMERGENCY DEPT VISIT HI MDM: CPT | Mod: 25

## 2021-06-03 PROCEDURE — 250N000009 HC RX 250: Performed by: EMERGENCY MEDICINE

## 2021-06-03 PROCEDURE — 99214 OFFICE O/P EST MOD 30 MIN: CPT | Performed by: FAMILY MEDICINE

## 2021-06-03 PROCEDURE — 85025 COMPLETE CBC W/AUTO DIFF WBC: CPT | Performed by: EMERGENCY MEDICINE

## 2021-06-03 PROCEDURE — 96374 THER/PROPH/DIAG INJ IV PUSH: CPT | Mod: 59

## 2021-06-03 PROCEDURE — 250N000011 HC RX IP 250 OP 636: Performed by: EMERGENCY MEDICINE

## 2021-06-03 PROCEDURE — 80048 BASIC METABOLIC PNL TOTAL CA: CPT | Performed by: EMERGENCY MEDICINE

## 2021-06-03 PROCEDURE — 74177 CT ABD & PELVIS W/CONTRAST: CPT

## 2021-06-03 PROCEDURE — 83605 ASSAY OF LACTIC ACID: CPT | Performed by: EMERGENCY MEDICINE

## 2021-06-03 PROCEDURE — 96361 HYDRATE IV INFUSION ADD-ON: CPT

## 2021-06-03 RX ORDER — RAMIPRIL 10 MG/1
CAPSULE ORAL
Qty: 90 CAPSULE | Refills: 0 | OUTPATIENT
Start: 2021-06-03

## 2021-06-03 RX ORDER — ONDANSETRON 4 MG/1
4 TABLET, ORALLY DISINTEGRATING ORAL EVERY 8 HOURS PRN
Qty: 10 TABLET | Refills: 0 | Status: SHIPPED | OUTPATIENT
Start: 2021-06-03 | End: 2021-06-06

## 2021-06-03 RX ORDER — MORPHINE SULFATE 4 MG/ML
4 INJECTION, SOLUTION INTRAMUSCULAR; INTRAVENOUS
Status: DISCONTINUED | OUTPATIENT
Start: 2021-06-03 | End: 2021-06-03 | Stop reason: HOSPADM

## 2021-06-03 RX ORDER — SODIUM CHLORIDE 9 MG/ML
INJECTION, SOLUTION INTRAVENOUS CONTINUOUS
Status: DISCONTINUED | OUTPATIENT
Start: 2021-06-03 | End: 2021-06-03 | Stop reason: HOSPADM

## 2021-06-03 RX ORDER — ONDANSETRON 2 MG/ML
4 INJECTION INTRAMUSCULAR; INTRAVENOUS
Status: DISCONTINUED | OUTPATIENT
Start: 2021-06-03 | End: 2021-06-03 | Stop reason: HOSPADM

## 2021-06-03 RX ORDER — RAMIPRIL 10 MG/1
10 CAPSULE ORAL DAILY
Qty: 90 CAPSULE | Refills: 3 | Status: SHIPPED | OUTPATIENT
Start: 2021-06-03 | End: 2022-02-09

## 2021-06-03 RX ORDER — IOPAMIDOL 755 MG/ML
500 INJECTION, SOLUTION INTRAVASCULAR ONCE
Status: COMPLETED | OUTPATIENT
Start: 2021-06-03 | End: 2021-06-03

## 2021-06-03 RX ADMIN — MORPHINE SULFATE 4 MG: 4 INJECTION, SOLUTION INTRAMUSCULAR; INTRAVENOUS at 06:33

## 2021-06-03 RX ADMIN — IOPAMIDOL 90 ML: 755 INJECTION, SOLUTION INTRAVENOUS at 06:51

## 2021-06-03 RX ADMIN — SODIUM CHLORIDE 62 ML: 9 INJECTION, SOLUTION INTRAVENOUS at 06:52

## 2021-06-03 RX ADMIN — SODIUM CHLORIDE 1000 ML: 9 INJECTION, SOLUTION INTRAVENOUS at 06:33

## 2021-06-03 ASSESSMENT — ANXIETY QUESTIONNAIRES
GAD7 TOTAL SCORE: 0
GAD7 TOTAL SCORE: 0
3. WORRYING TOO MUCH ABOUT DIFFERENT THINGS: NOT AT ALL
7. FEELING AFRAID AS IF SOMETHING AWFUL MIGHT HAPPEN: NOT AT ALL
4. TROUBLE RELAXING: NOT AT ALL
1. FEELING NERVOUS, ANXIOUS, OR ON EDGE: NOT AT ALL
5. BEING SO RESTLESS THAT IT IS HARD TO SIT STILL: NOT AT ALL
2. NOT BEING ABLE TO STOP OR CONTROL WORRYING: NOT AT ALL
7. FEELING AFRAID AS IF SOMETHING AWFUL MIGHT HAPPEN: NOT AT ALL
GAD7 TOTAL SCORE: 0
6. BECOMING EASILY ANNOYED OR IRRITABLE: NOT AT ALL

## 2021-06-03 ASSESSMENT — ENCOUNTER SYMPTOMS
CONSTIPATION: 1
SHORTNESS OF BREATH: 0
FEVER: 0
BLOOD IN STOOL: 0
CHILLS: 0
VOMITING: 1
ABDOMINAL PAIN: 1

## 2021-06-03 ASSESSMENT — PATIENT HEALTH QUESTIONNAIRE - PHQ9
SUM OF ALL RESPONSES TO PHQ QUESTIONS 1-9: 0
SUM OF ALL RESPONSES TO PHQ QUESTIONS 1-9: 0

## 2021-06-03 NOTE — PROGRESS NOTES
"    Assessment & Plan   Problem List Items Addressed This Visit     Essential hypertension, benign     Controlled.  Continue enalapril         Relevant Medications    ramipril (ALTACE) 10 MG capsule    Intestinal occlusion (H) - Primary     Evaluation in ED was considered consistent with ileus.  Patient was not terribly ill, and it was elected to send him home with small bowel rest.  He has in the past also self-directed small bowel rest with resolution of his obstructions.  Discussed at length.  Clear liquids, low residue, eventually fiber is suggested by surgery.         Small bowel obstruction (H)     Recurrent small bowel obstruction attributed to adhesions.  He was dismissed home and returned today for ED evaluation.  Discussed natural history dietary advancement.  He is also discussed this with surgery while hospitalized         Special screening for malignant neoplasms, colon     He is due.  I suggest he wait about 6 weeks, but we shall order this now         Relevant Orders    GASTROENTEROLOGY ADULT REF PROCEDURE ONLY                    BMI:   Estimated body mass index is 26.61 kg/m  as calculated from the following:    Height as of 5/24/21: 1.727 m (5' 8\").    Weight as of this encounter: 79.4 kg (175 lb).   Weight management plan: maintain        Return in about 5 months (around 11/3/2021) for flu shot.    Reyes Hinkle MD  [facility] KATTY Gilbert is a 65 year old who presents for the following health issues     HPI       Hospital Follow-up Visit:    Hospital/Nursing Home/IP Rehab Facility: [facility]  Date of Admission: 05/24/2021  Date of Discharge: 05/28/2021  Reason(s) for Admission: 1.  Recurrent small bowel obstruction from adhesions  2.  Acute  abdominal pain, nausea and vomiting secondary to #1.  3.  History of Multiple bouts of small bowel obstruction  4.  Previous history of abdominal surgery for perforated bowel during infancy  5.  History of " "diverticulitis  6.  Essential Hypertension  7.  History of dyslipidemia  8.  Nonobstructive nephrolithiasis       Was your hospitalization related to COVID-19? No   Problems taking medications regularly:  None  Medication changes since discharge: None  Problems adhering to non-medication therapy:  None    Summary of hospitalization:  [facility] discharge summary reviewed  Diagnostic Tests/Treatments reviewed.  Follow up needed: none  Other Healthcare Providers Involved in Patient s Care:         None  Update since discharge: stable. Post Discharge Medication Reconciliation: discharge medications reconciled, continue medications without change.  Plan of care communicated with patient              Review of Systems   No vomiting no abdominal pain no BM.  \"Burning\" is reduced          Objective    There were no vitals taken for this visit.  There is no height or weight on file to calculate BMI.  Physical Exam   Pos BS                Answers for HPI/ROS submitted by the patient on 6/3/2021   Chronic problems general questions HPI Form  PHQ9 TOTAL SCORE: 0  ALVA 7 TOTAL SCORE: 0    Reyes Hinkle MD      "

## 2021-06-03 NOTE — ASSESSMENT & PLAN NOTE
Recurrent small bowel obstruction attributed to adhesions.  He was dismissed home and returned today for ED evaluation.  Discussed natural history dietary advancement.  He is also discussed this with surgery while hospitalized

## 2021-06-03 NOTE — ASSESSMENT & PLAN NOTE
Evaluation in ED was considered consistent with ileus.  Patient was not terribly ill, and it was elected to send him home with small bowel rest.  He has in the past also self-directed small bowel rest with resolution of his obstructions.  Discussed at length.  Clear liquids, low residue, eventually fiber is suggested by surgery.

## 2021-06-03 NOTE — PROGRESS NOTES
Answers for HPI/ROS submitted by the patient on 6/3/2021   Chronic problems general questions HPI Form  PHQ9 TOTAL SCORE: 0  ALVA 7 TOTAL SCORE: 0  How many servings of fruits and vegetables do you eat daily?: 2-3  On average, how many sweetened beverages do you drink each day (Examples: soda, juice, sweet tea, etc.  Do NOT count diet or artificially sweetened beverages)?: 0  How many minutes a day do you exercise enough to make your heart beat faster?: 30 to 60  How many days a week do you exercise enough to make your heart beat faster?: 7  How many days per week do you miss taking your medication?: 0

## 2021-06-03 NOTE — ED PROVIDER NOTES
History   Chief Complaint:  Abdominal Pain    HPI   Vladimir Morse is a 65 year old male with history of diverticulitis, and recent small bowel obstruction who presents with abdominal pain. The patient was recently admitted to the hospital on 05/24/2021 for a small bowel obstruction. However, in the hospital the patient's pain resolved and he did not require surgical intervention on his bowel obstruction. He was discharged home on 05/28/2021 felling much improved. However, he notes that yesterday at around 1200 he began having a burning pain in his lower left abdomen. He states that this pain is the same as the pain he had when he was admitted to the hospital. The patient also mentions that he has been constipated since his discharge, but does have normal stools when he is able to go. He also reports having one episode of vomiting this morning along with worsening of his abdominal pain. He has been able to eat and drink. The patient otherwise denies any bloody stool, urinary changes, fever, chills, chest pain or shortness of breath.     Review of Systems   Constitutional: Negative for chills and fever.   Respiratory: Negative for shortness of breath.    Cardiovascular: Negative for chest pain.   Gastrointestinal: Positive for abdominal pain, constipation and vomiting. Negative for blood in stool.   Genitourinary: Negative.    All other systems reviewed and are negative.      Allergies:  The patient has no known allergies.     Medications:  Aspirin 81 mg   Lipitor  Altace     Past Medical History:    Benign positional vertigo  Cerebral aneurysm  Diverticulitis  Hypertension  Gilbert's syndrome  Hyperlipidemia  Skin lipoma  Nephrolithiasis  Presbycusis  Small bowel obstruction  Right foot hematoma  Transient ischemic attack  Erectile dysfunction  Obesity    Past Surgical History:    Knee arthroscopy  Colonoscopy  Cystoscopy   Back lesion excision  Lithotripsy  Knee cyst surgery   Tonsillectomy  Perforated bowel  surgery     Family History:    Mother: breast cancer  Father: colorectal cancer, colon cancer  Sister: breast cancer     Social History:  The patient presents with his wife.   He denies any smoking or alcohol use.     Physical Exam     Patient Vitals for the past 24 hrs:   BP Temp Pulse Resp SpO2   06/03/21 0645 126/82 -- 77 -- 100 %   06/03/21 0630 (!) 134/91 -- 78 -- 100 %   06/03/21 0607 (!) 150/89 98.9  F (37.2  C) 65 18 98 %       Physical Exam  General: Adult male sitting upright  Eyes: PERRL, Conjunctive within normal limits  ENT: Moist mucous membranes, oropharynx clear.   CV: Normal S1S2, no murmur, rub or gallop. Regular rate and rhythm  Resp: Clear to auscultation bilaterally, no wheezes, rales or rhonchi. Normal respiratory effort.  GI: Abdomen is soft and nondistended. Mid and lower left sided abdominal tenderness.  No palpable masses. No rebound or guarding. Scarring noted over RLQ abdomen.  MSK: No edema. Nontender. Normal active range of motion.  Skin: Warm and dry. No rashes or lesions or ecchymoses on visible skin.  Neuro: Alert and oriented. Responds appropriately to all questions and commands. No focal findings appreciated. Normal muscle tone.  Psych: Normal mood and affect. Pleasant.    Emergency Department Course     Imaging:  CT Abdomen Pelvis W Contrast  1.  Prominent fluid and air-filled segments of mid to distal small  bowel. No discrete transition point is identified.  These findings may  reflect a mild developing ileus but are not convincing for a bowel  obstruction. If the patient does have obstructive symptoms, imaging  follow-up would be recommended. No free air or free fluid.  2.  Small indeterminate left renal cortical lesion. Recommend  nonemergent follow-up with renal ultrasound.  3.  Bilateral nonobstructive nephrolithiasis. No hydronephrosis.  Reading per radiology.    Laboratory:  CBC: WBC 5.2, HGB 15.0,   BMP: WNL (Creatinine 0.72)    Lactic acid (result time 0730) 1.2        Emergency Department Course:    Reviewed:  I reviewed nursing notes, vitals, past medical history and care everywhere    Assessments:  0635 I obtained history and examined the patient as noted above.   0741 I rechecked the patient and explained findings. He is overall improved but still feels a burning sensation in his left abdomen.  He has tolerated p.o. without difficulty.    Interventions:  0633 NS, 1 L, IV bolus   0633 Morphine 4 mg IV     Disposition:  The patient was discharged to home.       Impression & Plan     Medical Decision Making:  Vladimir Morse is a 65-year-old male recently admitted with a small bowel obstruction who presents emergency department mild burning in the left lower abdomen with the pain from the bowel obstruction was.  Concern was for recurrent small bowel obstruction.  He is nontoxic in appearance.  He has no peritoneal signs.  His lactic acid and labs are normal.  CT scan fortunately did not show evidence of bowel obstruction.  There is perhaps changes consistent with an ileus but no other worrisome findings.  Even the patient's overall well appearance with minimal pain on reassessment inability to take p.o. I do not think he needs admission.  He is recommended bowel rest with liquid diet initially at advancing as he tolerates other foods.  Understands the need to stay hydrated.  Should return to me to emergency department worsening.  Recommend follow-up with his PCP as scheduled tonight.  All questions were answered prior to discharge.    Covid-19  Vladimir Morse was evaluated during a global COVID-19 pandemic, which necessitated consideration that the patient might be at risk for infection with the SARS-CoV-2 virus that causes COVID-19.   Applicable protocols for evaluation were followed during the patient's care.     Diagnosis:    ICD-10-CM    1. Left sided abdominal pain  R10.9    2. Ileus (H)  K56.7        Discharge Medications:  Discharge Medication List as of 6/3/2021  8:55 AM       START taking these medications    Details   ondansetron (ZOFRAN ODT) 4 MG ODT tab Take 1 tablet (4 mg) by mouth every 8 hours as needed for nausea or vomiting, Disp-10 tablet, R-0, Local Print             Scribe Disclosure:  I, Elver Graves, am serving as a scribe at 6:28 AM on 6/3/2021 to document services personally performed by Melba Webb MD based on my observations and the provider's statements to me.              Melba Webb MD  06/03/21 1774

## 2021-06-04 ASSESSMENT — ANXIETY QUESTIONNAIRES: GAD7 TOTAL SCORE: 0

## 2021-06-04 ASSESSMENT — PATIENT HEALTH QUESTIONNAIRE - PHQ9: SUM OF ALL RESPONSES TO PHQ QUESTIONS 1-9: 0

## 2021-06-09 DIAGNOSIS — Z11.59 ENCOUNTER FOR SCREENING FOR OTHER VIRAL DISEASES: ICD-10-CM

## 2021-07-10 DIAGNOSIS — Z11.59 ENCOUNTER FOR SCREENING FOR OTHER VIRAL DISEASES: ICD-10-CM

## 2021-07-10 LAB
SARS-COV-2 RNA RESP QL NAA+PROBE: NORMAL
SPECIMEN SOURCE: NORMAL

## 2021-07-10 PROCEDURE — U0005 INFEC AGEN DETEC AMPLI PROBE: HCPCS | Performed by: COLON & RECTAL SURGERY

## 2021-07-10 PROCEDURE — U0003 INFECTIOUS AGENT DETECTION BY NUCLEIC ACID (DNA OR RNA); SEVERE ACUTE RESPIRATORY SYNDROME CORONAVIRUS 2 (SARS-COV-2) (CORONAVIRUS DISEASE [COVID-19]), AMPLIFIED PROBE TECHNIQUE, MAKING USE OF HIGH THROUGHPUT TECHNOLOGIES AS DESCRIBED BY CMS-2020-01-R: HCPCS | Performed by: COLON & RECTAL SURGERY

## 2021-07-11 LAB
LABORATORY COMMENT REPORT: NORMAL
SARS-COV-2 RNA RESP QL NAA+PROBE: NEGATIVE
SPECIMEN SOURCE: NORMAL

## 2021-07-13 ENCOUNTER — HOSPITAL ENCOUNTER (OUTPATIENT)
Facility: CLINIC | Age: 66
Discharge: HOME | End: 2021-07-13
Attending: COLON & RECTAL SURGERY | Admitting: COLON & RECTAL SURGERY
Payer: COMMERCIAL

## 2021-07-13 VITALS
HEART RATE: 53 BPM | BODY MASS INDEX: 25.76 KG/M2 | RESPIRATION RATE: 16 BRPM | OXYGEN SATURATION: 100 % | WEIGHT: 170 LBS | SYSTOLIC BLOOD PRESSURE: 103 MMHG | HEIGHT: 68 IN | TEMPERATURE: 97.2 F | DIASTOLIC BLOOD PRESSURE: 69 MMHG

## 2021-07-13 LAB — COLONOSCOPY: NORMAL

## 2021-07-13 PROCEDURE — 250N000011 HC RX IP 250 OP 636: Performed by: COLON & RECTAL SURGERY

## 2021-07-13 PROCEDURE — 258N000003 HC RX IP 258 OP 636: Performed by: COLON & RECTAL SURGERY

## 2021-07-13 PROCEDURE — G0500 MOD SEDAT ENDO SERVICE >5YRS: HCPCS | Performed by: COLON & RECTAL SURGERY

## 2021-07-13 PROCEDURE — G0105 COLORECTAL SCRN; HI RISK IND: HCPCS | Performed by: COLON & RECTAL SURGERY

## 2021-07-13 PROCEDURE — 45378 DIAGNOSTIC COLONOSCOPY: CPT | Performed by: COLON & RECTAL SURGERY

## 2021-07-13 RX ORDER — ONDANSETRON 2 MG/ML
4 INJECTION INTRAMUSCULAR; INTRAVENOUS EVERY 6 HOURS PRN
Status: DISCONTINUED | OUTPATIENT
Start: 2021-07-13 | End: 2021-07-13 | Stop reason: HOSPADM

## 2021-07-13 RX ORDER — FLUMAZENIL 0.1 MG/ML
0.2 INJECTION, SOLUTION INTRAVENOUS
Status: DISCONTINUED | OUTPATIENT
Start: 2021-07-13 | End: 2021-07-13 | Stop reason: HOSPADM

## 2021-07-13 RX ORDER — LIDOCAINE 40 MG/G
CREAM TOPICAL
Status: DISCONTINUED | OUTPATIENT
Start: 2021-07-13 | End: 2021-07-13 | Stop reason: HOSPADM

## 2021-07-13 RX ORDER — ONDANSETRON 2 MG/ML
4 INJECTION INTRAMUSCULAR; INTRAVENOUS
Status: DISCONTINUED | OUTPATIENT
Start: 2021-07-13 | End: 2021-07-13 | Stop reason: HOSPADM

## 2021-07-13 RX ORDER — PROCHLORPERAZINE MALEATE 5 MG
5 TABLET ORAL EVERY 6 HOURS PRN
Status: DISCONTINUED | OUTPATIENT
Start: 2021-07-13 | End: 2021-07-13 | Stop reason: HOSPADM

## 2021-07-13 RX ORDER — SIMETHICONE 40MG/0.6ML
133 SUSPENSION, DROPS(FINAL DOSAGE FORM)(ML) ORAL
Status: DISCONTINUED | OUTPATIENT
Start: 2021-07-13 | End: 2021-07-13 | Stop reason: HOSPADM

## 2021-07-13 RX ORDER — NALOXONE HYDROCHLORIDE 0.4 MG/ML
0.2 INJECTION, SOLUTION INTRAMUSCULAR; INTRAVENOUS; SUBCUTANEOUS
Status: DISCONTINUED | OUTPATIENT
Start: 2021-07-13 | End: 2021-07-13 | Stop reason: HOSPADM

## 2021-07-13 RX ORDER — EPINEPHRINE 1 MG/ML
0.1 INJECTION, SOLUTION, CONCENTRATE INTRAVENOUS
Status: DISCONTINUED | OUTPATIENT
Start: 2021-07-13 | End: 2021-07-13 | Stop reason: HOSPADM

## 2021-07-13 RX ORDER — FENTANYL CITRATE 50 UG/ML
25 INJECTION, SOLUTION INTRAMUSCULAR; INTRAVENOUS
Status: DISCONTINUED | OUTPATIENT
Start: 2021-07-13 | End: 2021-07-13 | Stop reason: HOSPADM

## 2021-07-13 RX ORDER — FENTANYL CITRATE 50 UG/ML
25-50 INJECTION, SOLUTION INTRAMUSCULAR; INTRAVENOUS
Status: COMPLETED | OUTPATIENT
Start: 2021-07-13 | End: 2021-07-13

## 2021-07-13 RX ORDER — NALOXONE HYDROCHLORIDE 0.4 MG/ML
0.4 INJECTION, SOLUTION INTRAMUSCULAR; INTRAVENOUS; SUBCUTANEOUS
Status: DISCONTINUED | OUTPATIENT
Start: 2021-07-13 | End: 2021-07-13 | Stop reason: HOSPADM

## 2021-07-13 RX ORDER — ATROPINE SULFATE 0.4 MG/ML
0.4 AMPUL (ML) INJECTION
Status: DISCONTINUED | OUTPATIENT
Start: 2021-07-13 | End: 2021-07-13 | Stop reason: HOSPADM

## 2021-07-13 RX ORDER — ONDANSETRON 4 MG/1
4 TABLET, ORALLY DISINTEGRATING ORAL EVERY 6 HOURS PRN
Status: DISCONTINUED | OUTPATIENT
Start: 2021-07-13 | End: 2021-07-13 | Stop reason: HOSPADM

## 2021-07-13 RX ADMIN — MIDAZOLAM 2 MG: 1 INJECTION INTRAMUSCULAR; INTRAVENOUS at 10:08

## 2021-07-13 RX ADMIN — FENTANYL CITRATE 100 MCG: 50 INJECTION, SOLUTION INTRAMUSCULAR; INTRAVENOUS at 10:09

## 2021-07-13 RX ADMIN — SODIUM CHLORIDE 500 ML: 9 INJECTION, SOLUTION INTRAVENOUS at 10:17

## 2021-07-13 ASSESSMENT — MIFFLIN-ST. JEOR: SCORE: 1530.61

## 2021-07-13 NOTE — PROGRESS NOTES
Vital signs did not auto populate and were deleted.  Observed vital signs within normal limits and oxygen saturation above 95 percent through recovery.  Patient alert and oriented, denies abdominal pain, denies nausea, and tolerated clear liquids.  Discharge instructions were  Reviewed, questions answered and discharged ambularoty stable home.  Reviewed discharge instructions with .

## 2021-07-13 NOTE — DISCHARGE INSTRUCTIONS

## 2021-07-13 NOTE — H&P
Pre-Endoscopy History and Physical     Vladimir Morse MRN# [id number]   YOB: 1955 Age: 65 year old     Date of Procedure: 7/13/2021  Primary care provider: Reyes Hinkle  Type of Endoscopy: colonoscopy  Reason for Procedure: screening  Type of Anesthesia Anticipated: Moderate Sedation    HPI:    Vladimir is a 65 year old male who will be undergoing the above procedure.      A history and physical has been performed. The patient's medications and allergies have been reviewed. The risks and benefits of the procedure and the sedation options and risks were discussed with the patient.  All questions were answered and informed consent was obtained.      He denies a personal or family history of anesthesia complications or bleeding disorders.     Allergies   Allergen Reactions     No Known Drug Allergies         Prior to Admission Medications   Prescriptions Last Dose Informant Patient Reported? Taking?   aspirin 81 MG EC tablet Past Week at Unknown time  Yes Yes   Sig: Take 81 mg by mouth daily.     atorvastatin (LIPITOR) 20 MG tablet Past Week at Unknown time  No Yes   Sig: Take 1 tablet (20 mg) by mouth daily   ibuprofen (ADVIL/MOTRIN) 200 MG tablet   Yes No   Sig: Take 200 mg by mouth as needed for mild pain   ramipril (ALTACE) 10 MG capsule Past Week at Unknown time  No Yes   Sig: Take 1 capsule (10 mg) by mouth daily   sildenafil (VIAGRA) 100 MG tablet   No No   Sig: Take 1 tablet (100 mg) by mouth daily as needed      Facility-Administered Medications: None       Patient Active Problem List   Diagnosis     Diverticulitis of colon     Cerebral aneurysm     Nephrolithiasis     Presbycusis     Advanced directives, counseling/discussion     Hyperlipidemia     BPPV (benign paroxysmal positional vertigo), unspecified laterality     Gilbert's syndrome     Essential hypertension, benign     Lipoma of skin and subcutaneous tissue     Vasculogenic erectile dysfunction     Aftercare following surgery of the  musculoskeletal system     Hypertension     Knee joint replaced by other means     Obesity (BMI 30.0-34.9)     Primary osteoarthritis of right knee     Partial small bowel obstruction (H)     Small bowel obstruction (H)     Intestinal occlusion (H)     Special screening for malignant neoplasms, colon        Past Medical History:   Diagnosis Date     BPPV (benign paroxysmal positional vertigo), unspecified laterality 7/3/2015     Cerebral aneurysm 8/4/2011    atherosclerotic     DIVERTICULITIS OF COLON W/O BLEED 6/29/2006     Elevated blood pressure reading without diagnosis of hypertension 7/22/2018     Essential hypertension, benign 2/22/2018     Gilbert's syndrome 12/13/2016     Hyperlipidemia LDL goal <100 11/28/2014     Intestinal occlusion (H) 6/3/2021     Knee pain 2/6/2009     Lipoma of skin and subcutaneous tissue 6/22/2012     Nephrolithiasis 6/22/2012     Pain in joint, shoulder region 9/18/2010     Presbycusis 6/22/2012     SBO (small bowel obstruction) (H) 7/27/2012     Subungual hematoma of right foot, initial encounter 7/22/2018     TIA (transient ischaemic attack)      Unspecified intestinal obstruction     diverticulitis     Vasculogenic erectile dysfunction 5/16/2019        Past Surgical History:   Procedure Laterality Date     ARTHROSCOPY KNEE Right 10/26/2015    Procedure: ARTHROSCOPY KNEE;  Surgeon: Rodrigue Cole MD;  Location:  OR     AS TOTAL KNEE ARTHROPLASTY Right 03/05/2018     COLONOSCOPY  11/23/2015    Dr. Freedman [facility]     CYSTOSCOPY, RETROGRADES, INSERT STENT URETER(S), COMBINED  6/29/2012    Procedure: COMBINED CYSTOSCOPY, RETROGRADES, INSERT STENT URETER(S);  LEFT URETEROSCOPY, LEFT URETERAL STENT PLACEMENT;  Surgeon: Timothy Ortega MD;  Location:  OR     EXCISE LESION BACK Left 9/21/2018    Procedure: EXCISE LESION BACK;  Excision subcutaneous mass and skin lesion left back;  Surgeon: Prosper Loving MD;  Location:  OR     EXTRACORPOREAL SHOCK WAVE LITHOTRIPSY (ESWL)   "12/20/2012    Procedure: EXTRACORPOREAL SHOCK WAVE LITHOTRIPSY (ESWL);  LEFT EXTRACORPOREAL SHOCKWAVE LITHOTRIPSY ;  Surgeon: Timothy Ortega MD;  Location: [facility] OR     ORTHOPEDIC SURGERY      knee cyst removed     TONSILLECTOMY      T&A as a child     ZZC NONSPECIFIC PROCEDURE      Perforated bowel; infant       Social History     Tobacco Use     Smoking status: Never Smoker     Smokeless tobacco: Never Used   Substance Use Topics     Alcohol use: Yes     Comment: rarely       Family History   Problem Relation Age of Onset     Breast Cancer Mother      Cancer - colorectal Father      Colon Cancer Father      Breast Cancer Sister        REVIEW OF SYSTEMS:     5 point ROS negative except as noted above in HPI, including Gen., Resp., CV, GI &  system review.      PHYSICAL EXAM:   Ht 1.727 m (5' 8\")   Wt 77.1 kg (170 lb)   BMI 25.85 kg/m   Estimated body mass index is 25.85 kg/m  as calculated from the following:    Height as of this encounter: 1.727 m (5' 8\").    Weight as of this encounter: 77.1 kg (170 lb).   GENERAL APPEARANCE: healthy and alert  MENTAL STATUS: alert  AIRWAY EXAM: Mallampatti Class II (visualization of the soft palate, fauces, and uvula)  RESP: lungs clear to auscultation - no rales, rhonchi or wheezes  CV: regular rates and rhythm      DIAGNOSTICS:    Not indicated      IMPRESSION   ASA Class 2 - Mild systemic disease        PLAN:       Plan for colonoscopy. We discussed the risks, benefits and alternatives and the patient wished to proceed.    The above has been forwarded to the consulting provider.      Signed Electronically by: Luzmaria Ny MD, MD  July 13, 2021    "

## 2021-07-19 ENCOUNTER — OFFICE VISIT (OUTPATIENT)
Dept: AUDIOLOGY | Facility: CLINIC | Age: 66
End: 2021-07-19
Payer: COMMERCIAL

## 2021-07-19 DIAGNOSIS — H93.13 TINNITUS, BILATERAL: ICD-10-CM

## 2021-07-19 PROCEDURE — 99207 PR NO CHARGE LOS: CPT | Performed by: AUDIOLOGIST

## 2021-07-19 PROCEDURE — 92557 COMPREHENSIVE HEARING TEST: CPT | Performed by: AUDIOLOGIST

## 2021-07-19 PROCEDURE — 92567 TYMPANOMETRY: CPT | Performed by: AUDIOLOGIST

## 2021-07-19 NOTE — PROGRESS NOTES
AUDIOLOGY REPORT    SUBJECTIVE:  Vladimir Morse is a 65 year old male who was seen in the Audiology [facility] on 7/19/21 for audiologic evaluation, referred by self.  The patient reports a gradual decline in hearing, bilateral tinnitus, and a history of noise exposure as a . The patient denies  bilateral otalgia, bilateral drainage, bilateral aural fullness, family history of hearing loss, and dizziness. The patient notes difficulty with communication in a variety of listening situations. Patient was unaccompanied to today's visit.     Abuse Screening:  Do you feel unsafe at home or work/school? No  Do you feel threatened by someone? No  Does anyone try to keep you from having contact with others, or doing things outside of your home? No  Physical signs of abuse present? No     OBJECTIVE:    Otoscopic exam indicates partial obstruction with cerumen bilaterally     Pure Tone Thresholds assessed using standard techniques  audiometry with good  reliability from 250-8000 Hz bilaterally using circumaural headphones     RIGHT:  normal and borderline-normal sensorineural hearing loss    LEFT:    normal and borderline-normal sensorineural hearing loss    NOTE: Thresholds could not be rechecked with inserts due to the level of cerumen bilaterally.     Tympanogram:    RIGHT: hypercompliant     LEFT:   hypercompliant     Reflexes (reported by stimulus ear): 1000 Hz   Could not maintain seal     Speech Reception Threshold:    RIGHT: 30 dB HL    LEFT:   15 dB HL    Word Recognition Score:     RIGHT: 48% at 70 dB HL using NU-6 recorded word list.    RIGHT: 48% at 80 dB HL using NU-6 recorded word list.    LEFT:   92% at 65 dB HL using NU-6 recorded word list.    ASSESSMENT:   Bilateral asymmetric sensorineural hearing loss and tinnitus     Today s results were discussed with the patient in detail.     PLAN:  Patient was counseled regarding hearing loss and impact on  communication.  Patient is a good candidate for amplification at this time. Handout on good communication strategies, and hearing aid use was given to patient. It is recommended that the patient see ENT for the asymmetry between ears and for cerumen removal. Following ENT appointment patient should return to audiology for a hearing aid consultation.  Please call this clinic with questions regarding these results or recommendations.    Hiren Clifton CCC-A  Licensed Audiologist #[id number]  7/19/2021

## 2021-07-27 ENCOUNTER — OFFICE VISIT (OUTPATIENT)
Dept: OTOLARYNGOLOGY | Facility: CLINIC | Age: 66
End: 2021-07-27
Payer: COMMERCIAL

## 2021-07-27 ENCOUNTER — ALLIED HEALTH/NURSE VISIT (OUTPATIENT)
Dept: FAMILY MEDICINE | Facility: CLINIC | Age: 66
End: 2021-07-27
Payer: COMMERCIAL

## 2021-07-27 VITALS
HEART RATE: 68 BPM | BODY MASS INDEX: 25.85 KG/M2 | DIASTOLIC BLOOD PRESSURE: 82 MMHG | SYSTOLIC BLOOD PRESSURE: 137 MMHG | RESPIRATION RATE: 16 BRPM | WEIGHT: 170 LBS | OXYGEN SATURATION: 98 %

## 2021-07-27 DIAGNOSIS — Z23 ENCOUNTER FOR IMMUNIZATION: Primary | ICD-10-CM

## 2021-07-27 DIAGNOSIS — H61.23 BILATERAL IMPACTED CERUMEN: ICD-10-CM

## 2021-07-27 DIAGNOSIS — H90.3 SNHL (SENSORY-NEURAL HEARING LOSS), ASYMMETRICAL: Primary | ICD-10-CM

## 2021-07-27 PROCEDURE — 90471 IMMUNIZATION ADMIN: CPT

## 2021-07-27 PROCEDURE — 90750 HZV VACC RECOMBINANT IM: CPT

## 2021-07-27 PROCEDURE — 99207 PR NO CHARGE NURSE ONLY: CPT

## 2021-07-27 PROCEDURE — 69210 REMOVE IMPACTED EAR WAX UNI: CPT | Performed by: OTOLARYNGOLOGY

## 2021-07-27 PROCEDURE — 99203 OFFICE O/P NEW LOW 30 MIN: CPT | Mod: 25 | Performed by: OTOLARYNGOLOGY

## 2021-07-27 NOTE — PROGRESS NOTES
Chief Complaint - Hearing loss    History of Present Illness - Vladimir Morse is a 65 year old male who presents to me today with hearing loss in both ears, but right ear is worse.  It has been present and noticeable for approximately years.  It was gradual in onset. He notes hearing loss. There is no history of chronic ear disease or ear surgery.  With regards to recreational, , and work-related noise exposure he only has some. No family history of hearing loss at a young age. The patient denies otorrhea and otalgia. He saw audiology and had cerumen impaction and asymmetric sensorineural hearing loss.     Past Medical History -   Patient Active Problem List   Diagnosis     Diverticulitis of colon     Cerebral aneurysm     Nephrolithiasis     Presbycusis     Advanced directives, counseling/discussion     Hyperlipidemia     BPPV (benign paroxysmal positional vertigo), unspecified laterality     Gilbert's syndrome     Essential hypertension, benign     Lipoma of skin and subcutaneous tissue     Vasculogenic erectile dysfunction     Aftercare following surgery of the musculoskeletal system     Hypertension     Knee joint replaced by other means     Obesity (BMI 30.0-34.9)     Primary osteoarthritis of right knee     Partial small bowel obstruction (H)     Small bowel obstruction (H)     Intestinal occlusion (H)     Special screening for malignant neoplasms, colon       Current Medications -   Current Outpatient Medications:      aspirin 81 MG EC tablet, Take 81 mg by mouth daily.  , Disp: , Rfl:      atorvastatin (LIPITOR) 20 MG tablet, Take 1 tablet (20 mg) by mouth daily, Disp: 90 tablet, Rfl: 3     ibuprofen (ADVIL/MOTRIN) 200 MG tablet, Take 200 mg by mouth as needed for mild pain, Disp: , Rfl:      ramipril (ALTACE) 10 MG capsule, Take 1 capsule (10 mg) by mouth daily, Disp: 90 capsule, Rfl: 3     sildenafil (VIAGRA) 100 MG tablet, Take 1 tablet (100 mg) by mouth daily as needed, Disp: 6 tablet, Rfl:  3    Allergies -   Allergies   Allergen Reactions     No Known Drug Allergies        Social History -   Social History     Socioeconomic History     Marital status:      Spouse name: Not on file     Number of children: Not on file     Years of education: Not on file     Highest education level: Not on file   Occupational History     Not on file   Tobacco Use     Smoking status: Never Smoker     Smokeless tobacco: Never Used   Substance and Sexual Activity     Alcohol use: Yes     Comment: rarely     Drug use: No     Sexual activity: Yes     Partners: Female   Other Topics Concern     Parent/sibling w/ CABG, MI or angioplasty before 65F 55M? Not Asked   Social History Narrative     Not on file     Social Determinants of Health     Financial Resource Strain:      Difficulty of Paying Living Expenses:    Food Insecurity:      Worried About Running Out of Food in the Last Year:      Ran Out of Food in the Last Year:    Transportation Needs:      Lack of Transportation (Medical):      Lack of Transportation (Non-Medical):    Physical Activity:      Days of Exercise per Week:      Minutes of Exercise per Session:    Stress:      Feeling of Stress :    Social Connections:      Frequency of Communication with Friends and Family:      Frequency of Social Gatherings with Friends and Family:      Attends [employer] Services:      Active Member of Clubs or Organizations:      Attends Club or Organization Meetings:      Marital Status:    Intimate Partner Violence:      Fear of Current or Ex-Partner:      Emotionally Abused:      Physically Abused:      Sexually Abused:        Family History -   Family History   Problem Relation Age of Onset     Breast Cancer Mother      Cancer - colorectal Father      Colon Cancer Father      Breast Cancer Sister        Physical Exam  /82   Pulse 68   Resp 16   Wt 77.1 kg (170 lb)   SpO2 98%   BMI 25.85 kg/m    General - The patient is in no distress.  Alert and oriented to  person and place, answers questions and cooperates with examination appropriately.   Voice and Breathing - The patient was breathing comfortably without the use of accessory muscles. There was no wheezing, stridor, or stertor.  The patients voice was clear and strong.  Ears - The auricles are normal. Bilateral cerumen impaction, see below. Once clean, the tympanic membranes are normal in appearance, bony landmarks are intact.  No retraction, perforation, or masses.  No fluid or purulence was seen in the external canal or the middle ear. No evidence of infection of the middle ear or external canal, cerumen was normal in appearance.  Eyes - Extraocular movements intact.  Sclera were not icteric or injected.  Neck - Soft, non-tender. Palpation of the occipital, submental, submandibular, internal jugular chain, and supraclavicular nodes did not demonstrate any abnormal lymph nodes or masses. Parotid glands had no masses. Palpation of the thyroid was soft and smooth, with no nodules or goiter appreciated.  The trachea was mobile and midline.  Neurological - Cranial nerves 2 through 12 were grossly intact. House-Brackmann grade 1 out of 6 bilaterally.       Audiologic Studies - An audiogram and tympanogram was performed 7/19/21 as part of the evaluation and personally reviewed. The tympanogram shows a normal Type A curve, with normal canal volume and middle ear pressure.  There is no sign of eustachian tube dysfunction or middle ear effusion.  The audiogram showed a significant down-sloping high frequency sensorineural hearing loss.  There was no evidence of conductive hearing loss or significant asymmetry.    Physical Exam and Procedure  Ears - On examination of the ears, I found that both ears were impacted with cerumen.  Therefore, I positioned the patient in the examination chair in a semi-supine position. I used the binocular surgical microscope to perform cerumen removal.  On the right side, I began by using a  cerumen loop to gently lift the edges of the cerumen mass away from the walls of the external canal.  Once I did this, I was able to pull away fragments of wax and debris. I also had to suction wax deep in the canal with a #7. I removed all the wax and debri.  The tympanic membrane was intact, no sign of perforation or middle ear effusion.    I turned my attention to the left side once again using the binocular surgical microscope to perform cerumen removal.  I began by using a cerumen loop to gently lift the edges of the cerumen mass away from the walls of the external canal.  Once I did this, I was able to pull away fragments of wax and debris. I removed all the wax and debri. The tympanic membrane was intact, no sign of perforation or middle ear effusion.    Assessment and Plan - Vladimir Morse is a 65 year old male who presents to me today with asymmetric sensorineural hearing loss.  I recommend MRI brain to rule-out retrocochlear pathology. He had cerumen impaction, but not enough to account for the asymmetric sensorineural hearing loss. I recommend a hearing aid consultation and hearing aids.    Clifton Salinas MD  Otolaryngology  [facility]

## 2021-07-27 NOTE — LETTER
7/27/2021         RE: Vladimir Morse  [address] [place] [facility] [address]        Dear Colleague,    Thank you for referring your patient, Vladimir Morse, to the [facility]. Please see a copy of my visit note below.    Chief Complaint - Hearing loss    History of Present Illness - Vladimir Morse is a 65 year old male who presents to me today with hearing loss in both ears, but right ear is worse.  It has been present and noticeable for approximately years.  It was gradual in onset. He notes hearing loss. There is no history of chronic ear disease or ear surgery.  With regards to recreational, , and work-related noise exposure he only has some. No family history of hearing loss at a young age. The patient denies otorrhea and otalgia. He saw audiology and had cerumen impaction and asymmetric sensorineural hearing loss.     Past Medical History -   Patient Active Problem List   Diagnosis     Diverticulitis of colon     Cerebral aneurysm     Nephrolithiasis     Presbycusis     Advanced directives, counseling/discussion     Hyperlipidemia     BPPV (benign paroxysmal positional vertigo), unspecified laterality     Gilbert's syndrome     Essential hypertension, benign     Lipoma of skin and subcutaneous tissue     Vasculogenic erectile dysfunction     Aftercare following surgery of the musculoskeletal system     Hypertension     Knee joint replaced by other means     Obesity (BMI 30.0-34.9)     Primary osteoarthritis of right knee     Partial small bowel obstruction (H)     Small bowel obstruction (H)     Intestinal occlusion (H)     Special screening for malignant neoplasms, colon       Current Medications -   Current Outpatient Medications:      aspirin 81 MG EC tablet, Take 81 mg by mouth daily.  , Disp: , Rfl:      atorvastatin (LIPITOR) 20 MG tablet, Take 1 tablet (20 mg) by mouth daily, Disp: 90 tablet, Rfl: 3     ibuprofen (ADVIL/MOTRIN) 200 MG tablet, Take 200 mg by mouth  as needed for mild pain, Disp: , Rfl:      ramipril (ALTACE) 10 MG capsule, Take 1 capsule (10 mg) by mouth daily, Disp: 90 capsule, Rfl: 3     sildenafil (VIAGRA) 100 MG tablet, Take 1 tablet (100 mg) by mouth daily as needed, Disp: 6 tablet, Rfl: 3    Allergies -   Allergies   Allergen Reactions     No Known Drug Allergies        Social History -   Social History     Socioeconomic History     Marital status:      Spouse name: Not on file     Number of children: Not on file     Years of education: Not on file     Highest education level: Not on file   Occupational History     Not on file   Tobacco Use     Smoking status: Never Smoker     Smokeless tobacco: Never Used   Substance and Sexual Activity     Alcohol use: Yes     Comment: rarely     Drug use: No     Sexual activity: Yes     Partners: Female   Other Topics Concern     Parent/sibling w/ CABG, MI or angioplasty before 65F 55M? Not Asked   Social History Narrative     Not on file     Social Determinants of Health     Financial Resource Strain:      Difficulty of Paying Living Expenses:    Food Insecurity:      Worried About Running Out of Food in the Last Year:      Ran Out of Food in the Last Year:    Transportation Needs:      Lack of Transportation (Medical):      Lack of Transportation (Non-Medical):    Physical Activity:      Days of Exercise per Week:      Minutes of Exercise per Session:    Stress:      Feeling of Stress :    Social Connections:      Frequency of Communication with Friends and Family:      Frequency of Social Gatherings with Friends and Family:      Attends Taoism Services:      Active Member of Clubs or Organizations:      Attends Club or Organization Meetings:      Marital Status:    Intimate Partner Violence:      Fear of Current or Ex-Partner:      Emotionally Abused:      Physically Abused:      Sexually Abused:        Family History -   Family History   Problem Relation Age of Onset     Breast Cancer Mother      Cancer  - colorectal Father      Colon Cancer Father      Breast Cancer Sister        Physical Exam  /82   Pulse 68   Resp 16   Wt 77.1 kg (170 lb)   SpO2 98%   BMI 25.85 kg/m    General - The patient is in no distress.  Alert and oriented to person and place, answers questions and cooperates with examination appropriately.   Voice and Breathing - The patient was breathing comfortably without the use of accessory muscles. There was no wheezing, stridor, or stertor.  The patients voice was clear and strong.  Ears - The auricles are normal. Bilateral cerumen impaction, see below. Once clean, the tympanic membranes are normal in appearance, bony landmarks are intact.  No retraction, perforation, or masses.  No fluid or purulence was seen in the external canal or the middle ear. No evidence of infection of the middle ear or external canal, cerumen was normal in appearance.  Eyes - Extraocular movements intact.  Sclera were not icteric or injected.  Neck - Soft, non-tender. Palpation of the occipital, submental, submandibular, internal jugular chain, and supraclavicular nodes did not demonstrate any abnormal lymph nodes or masses. Parotid glands had no masses. Palpation of the thyroid was soft and smooth, with no nodules or goiter appreciated.  The trachea was mobile and midline.  Neurological - Cranial nerves 2 through 12 were grossly intact. House-Brackmann grade 1 out of 6 bilaterally.       Audiologic Studies - An audiogram and tympanogram was performed 7/19/21 as part of the evaluation and personally reviewed. The tympanogram shows a normal Type A curve, with normal canal volume and middle ear pressure.  There is no sign of eustachian tube dysfunction or middle ear effusion.  The audiogram showed a significant down-sloping high frequency sensorineural hearing loss.  There was no evidence of conductive hearing loss or significant asymmetry.    Physical Exam and Procedure  Ears - On examination of the ears, I found  that both ears were impacted with cerumen.  Therefore, I positioned the patient in the examination chair in a semi-supine position. I used the binocular surgical microscope to perform cerumen removal.  On the right side, I began by using a cerumen loop to gently lift the edges of the cerumen mass away from the walls of the external canal.  Once I did this, I was able to pull away fragments of wax and debris. I also had to suction wax deep in the canal with a #7. I removed all the wax and debri.  The tympanic membrane was intact, no sign of perforation or middle ear effusion.    I turned my attention to the left side once again using the binocular surgical microscope to perform cerumen removal.  I began by using a cerumen loop to gently lift the edges of the cerumen mass away from the walls of the external canal.  Once I did this, I was able to pull away fragments of wax and debris. I removed all the wax and debri. The tympanic membrane was intact, no sign of perforation or middle ear effusion.    Assessment and Plan - Vladimir Morse is a 65 year old male who presents to me today with asymmetric sensorineural hearing loss.  I recommend MRI brain to rule-out retrocochlear pathology. He had cerumen impaction, but not enough to account for the asymmetric sensorineural hearing loss. I recommend a hearing aid consultation and hearing aids.    Clifton Salinas MD  Otolaryngology  [facility]          Again, thank you for allowing me to participate in the care of your patient.        Sincerely,        Clifton Salinas MD

## 2021-07-28 ENCOUNTER — TELEPHONE (OUTPATIENT)
Dept: OTOLARYNGOLOGY | Facility: CLINIC | Age: 66
End: 2021-07-28

## 2021-07-28 NOTE — TELEPHONE ENCOUNTER
Faxed order to [facility] [phone].  For MRI.     Alisia Avila MA, CMA ......7/28/2021...11:12 AM

## 2021-07-28 NOTE — TELEPHONE ENCOUNTER
VO provided to [facility] for fax. As pt and wife want to schedule ASAP.    Bess HILL RN Specialty Triage 7/28/2021 12:30 PM

## 2021-07-28 NOTE — TELEPHONE ENCOUNTER
Reason for Call: Request for an order or referral:    Order or referral being requested: MRI of brain    Date needed: as soon as possible    Has the patient been seen by the PCP for this problem? YES    Additional comments: Patient saw Dr. Salinas yesterday and he ordered an MRI.  Referral was given for [facility]. No appointments available in the next few days.  Patient will be starting a new insurance plan on Monday, and would like to be able to get the MRI completely covered and done as soon as possible.    Patient is requesting the order be faxed to [phone] ([facility], formerly [facility]).  They need the order sent to them before patient can be scheduled.  Please call Terri (wife) back after this has been faxed so she can schedule appointment.     Phone number Patient can be reached at:  Home number on file [phone] (home)    Best Time:  anytime    Can we leave a detailed message on this number?  YES    Call taken on 7/28/2021 at 10:00 AM by Urvashi Osorio

## 2021-07-29 ENCOUNTER — TRANSFERRED RECORDS (OUTPATIENT)
Dept: HEALTH INFORMATION MANAGEMENT | Facility: CLINIC | Age: 66
End: 2021-07-29

## 2021-08-02 ENCOUNTER — TELEPHONE (OUTPATIENT)
Dept: OTOLARYNGOLOGY | Facility: CLINIC | Age: 66
End: 2021-08-02

## 2021-08-03 NOTE — TELEPHONE ENCOUNTER
I left a message. MRI brain showed no retrocochlear pathology to account for the patient's sensorineural hearing loss. He had mild age related changes in the brain consistent with mild small vessel ischemic changes. There is nothing further to do with this. He can proceed with hearing aids.

## 2021-09-04 ENCOUNTER — HEALTH MAINTENANCE LETTER (OUTPATIENT)
Age: 66
End: 2021-09-04

## 2022-02-09 ENCOUNTER — OFFICE VISIT (OUTPATIENT)
Dept: FAMILY MEDICINE | Facility: CLINIC | Age: 67
End: 2022-02-09
Payer: COMMERCIAL

## 2022-02-09 VITALS
TEMPERATURE: 98.3 F | HEART RATE: 65 BPM | BODY MASS INDEX: 27.09 KG/M2 | WEIGHT: 178.19 LBS | DIASTOLIC BLOOD PRESSURE: 76 MMHG | SYSTOLIC BLOOD PRESSURE: 128 MMHG | OXYGEN SATURATION: 99 %

## 2022-02-09 DIAGNOSIS — N52.9 VASCULOGENIC ERECTILE DYSFUNCTION, UNSPECIFIED VASCULOGENIC ERECTILE DYSFUNCTION TYPE: ICD-10-CM

## 2022-02-09 DIAGNOSIS — G45.0 BASILAR ARTERY SYNDROME: ICD-10-CM

## 2022-02-09 DIAGNOSIS — R63.4 WEIGHT LOSS: ICD-10-CM

## 2022-02-09 DIAGNOSIS — E78.00 PURE HYPERCHOLESTEROLEMIA: ICD-10-CM

## 2022-02-09 DIAGNOSIS — Z12.5 SPECIAL SCREENING FOR MALIGNANT NEOPLASM OF PROSTATE: ICD-10-CM

## 2022-02-09 DIAGNOSIS — H91.13 PRESBYCUSIS OF BOTH EARS: ICD-10-CM

## 2022-02-09 DIAGNOSIS — Z00.00 ENCOUNTER FOR MEDICARE ANNUAL WELLNESS EXAM: Primary | ICD-10-CM

## 2022-02-09 DIAGNOSIS — I10 ESSENTIAL HYPERTENSION, BENIGN: ICD-10-CM

## 2022-02-09 PROBLEM — Z47.89 AFTERCARE FOLLOWING SURGERY OF THE MUSCULOSKELETAL SYSTEM: Status: RESOLVED | Noted: 2018-03-19 | Resolved: 2022-02-09

## 2022-02-09 PROBLEM — E66.811 OBESITY (BMI 30.0-34.9): Status: RESOLVED | Noted: 2018-03-05 | Resolved: 2022-02-09

## 2022-02-09 PROBLEM — K56.609: Status: RESOLVED | Noted: 2021-06-03 | Resolved: 2022-02-09

## 2022-02-09 PROBLEM — K56.609 SMALL BOWEL OBSTRUCTION (H): Status: RESOLVED | Noted: 2021-05-24 | Resolved: 2022-02-09

## 2022-02-09 LAB
ALBUMIN SERPL-MCNC: 4.1 G/DL (ref 3.4–5)
ALBUMIN UR-MCNC: NEGATIVE MG/DL
ALP SERPL-CCNC: 103 U/L (ref 40–150)
ALT SERPL W P-5'-P-CCNC: 44 U/L (ref 0–70)
ANION GAP SERPL CALCULATED.3IONS-SCNC: 2 MMOL/L (ref 3–14)
APPEARANCE UR: CLEAR
AST SERPL W P-5'-P-CCNC: 21 U/L (ref 0–45)
BASOPHILS # BLD AUTO: 0 10E3/UL (ref 0–0.2)
BASOPHILS NFR BLD AUTO: 0 %
BILIRUB SERPL-MCNC: 2.4 MG/DL (ref 0.2–1.3)
BILIRUB UR QL STRIP: NEGATIVE
BUN SERPL-MCNC: 17 MG/DL (ref 7–30)
CALCIUM SERPL-MCNC: 9.1 MG/DL (ref 8.5–10.1)
CHLORIDE BLD-SCNC: 109 MMOL/L (ref 94–109)
CHOLEST SERPL-MCNC: 138 MG/DL
CO2 SERPL-SCNC: 30 MMOL/L (ref 20–32)
COLOR UR AUTO: YELLOW
CREAT SERPL-MCNC: 0.7 MG/DL (ref 0.66–1.25)
EOSINOPHIL # BLD AUTO: 0.1 10E3/UL (ref 0–0.7)
EOSINOPHIL NFR BLD AUTO: 1 %
ERYTHROCYTE [DISTWIDTH] IN BLOOD BY AUTOMATED COUNT: 14 % (ref 10–15)
FASTING STATUS PATIENT QL REPORTED: NORMAL
GFR SERPL CREATININE-BSD FRML MDRD: >90 ML/MIN/1.73M2
GLUCOSE BLD-MCNC: 90 MG/DL (ref 70–99)
GLUCOSE UR STRIP-MCNC: NEGATIVE MG/DL
HCT VFR BLD AUTO: 49 % (ref 40–53)
HDLC SERPL-MCNC: 60 MG/DL
HGB BLD-MCNC: 15.7 G/DL (ref 13.3–17.7)
HGB UR QL STRIP: NEGATIVE
KETONES UR STRIP-MCNC: NEGATIVE MG/DL
LDLC SERPL CALC-MCNC: 63 MG/DL
LEUKOCYTE ESTERASE UR QL STRIP: NEGATIVE
LYMPHOCYTES # BLD AUTO: 1.7 10E3/UL (ref 0.8–5.3)
LYMPHOCYTES NFR BLD AUTO: 24 %
MCH RBC QN AUTO: 28.8 PG (ref 26.5–33)
MCHC RBC AUTO-ENTMCNC: 32 G/DL (ref 31.5–36.5)
MCV RBC AUTO: 90 FL (ref 78–100)
MONOCYTES # BLD AUTO: 0.5 10E3/UL (ref 0–1.3)
MONOCYTES NFR BLD AUTO: 6 %
NEUTROPHILS # BLD AUTO: 4.8 10E3/UL (ref 1.6–8.3)
NEUTROPHILS NFR BLD AUTO: 67 %
NITRATE UR QL: NEGATIVE
NONHDLC SERPL-MCNC: 78 MG/DL
PH UR STRIP: 7 [PH] (ref 5–7)
PLATELET # BLD AUTO: 240 10E3/UL (ref 150–450)
POTASSIUM BLD-SCNC: 4.3 MMOL/L (ref 3.4–5.3)
PROT SERPL-MCNC: 7.8 G/DL (ref 6.8–8.8)
PSA SERPL-MCNC: 2.23 UG/L (ref 0–4)
RBC # BLD AUTO: 5.46 10E6/UL (ref 4.4–5.9)
SODIUM SERPL-SCNC: 141 MMOL/L (ref 133–144)
SP GR UR STRIP: 1.02 (ref 1–1.03)
TOTAL PROTEIN SERUM FOR ELP: 7.5 G/DL (ref 6.8–8.8)
TRIGL SERPL-MCNC: 77 MG/DL
TSH SERPL DL<=0.005 MIU/L-ACNC: 1.84 MU/L (ref 0.4–4)
UROBILINOGEN UR STRIP-ACNC: 1 E.U./DL
WBC # BLD AUTO: 7.1 10E3/UL (ref 4–11)

## 2022-02-09 PROCEDURE — 84165 PROTEIN E-PHORESIS SERUM: CPT | Performed by: PATHOLOGY

## 2022-02-09 PROCEDURE — 80061 LIPID PANEL: CPT | Performed by: FAMILY MEDICINE

## 2022-02-09 PROCEDURE — G0103 PSA SCREENING: HCPCS | Performed by: FAMILY MEDICINE

## 2022-02-09 PROCEDURE — 80053 COMPREHEN METABOLIC PANEL: CPT | Performed by: FAMILY MEDICINE

## 2022-02-09 PROCEDURE — 84443 ASSAY THYROID STIM HORMONE: CPT | Performed by: FAMILY MEDICINE

## 2022-02-09 PROCEDURE — 81003 URINALYSIS AUTO W/O SCOPE: CPT | Performed by: FAMILY MEDICINE

## 2022-02-09 PROCEDURE — 36415 COLL VENOUS BLD VENIPUNCTURE: CPT | Performed by: FAMILY MEDICINE

## 2022-02-09 PROCEDURE — G0439 PPPS, SUBSEQ VISIT: HCPCS | Performed by: FAMILY MEDICINE

## 2022-02-09 PROCEDURE — 99214 OFFICE O/P EST MOD 30 MIN: CPT | Mod: 25 | Performed by: FAMILY MEDICINE

## 2022-02-09 PROCEDURE — 85025 COMPLETE CBC W/AUTO DIFF WBC: CPT | Performed by: FAMILY MEDICINE

## 2022-02-09 RX ORDER — RAMIPRIL 10 MG/1
10 CAPSULE ORAL DAILY
Qty: 90 CAPSULE | Refills: 3 | Status: SHIPPED | OUTPATIENT
Start: 2022-02-09 | End: 2023-04-12

## 2022-02-09 SDOH — ECONOMIC STABILITY: INCOME INSECURITY: HOW HARD IS IT FOR YOU TO PAY FOR THE VERY BASICS LIKE FOOD, HOUSING, MEDICAL CARE, AND HEATING?: NOT HARD AT ALL

## 2022-02-09 SDOH — ECONOMIC STABILITY: TRANSPORTATION INSECURITY
IN THE PAST 12 MONTHS, HAS THE LACK OF TRANSPORTATION KEPT YOU FROM MEDICAL APPOINTMENTS OR FROM GETTING MEDICATIONS?: NO

## 2022-02-09 SDOH — ECONOMIC STABILITY: TRANSPORTATION INSECURITY
IN THE PAST 12 MONTHS, HAS LACK OF TRANSPORTATION KEPT YOU FROM MEETINGS, WORK, OR FROM GETTING THINGS NEEDED FOR DAILY LIVING?: NO

## 2022-02-09 SDOH — HEALTH STABILITY: PHYSICAL HEALTH: ON AVERAGE, HOW MANY MINUTES DO YOU ENGAGE IN EXERCISE AT THIS LEVEL?: 30 MIN

## 2022-02-09 SDOH — HEALTH STABILITY: PHYSICAL HEALTH: ON AVERAGE, HOW MANY DAYS PER WEEK DO YOU ENGAGE IN MODERATE TO STRENUOUS EXERCISE (LIKE A BRISK WALK)?: 7 DAYS

## 2022-02-09 SDOH — ECONOMIC STABILITY: FOOD INSECURITY: WITHIN THE PAST 12 MONTHS, YOU WORRIED THAT YOUR FOOD WOULD RUN OUT BEFORE YOU GOT MONEY TO BUY MORE.: NEVER TRUE

## 2022-02-09 SDOH — ECONOMIC STABILITY: INCOME INSECURITY: IN THE LAST 12 MONTHS, WAS THERE A TIME WHEN YOU WERE NOT ABLE TO PAY THE MORTGAGE OR RENT ON TIME?: NO

## 2022-02-09 SDOH — ECONOMIC STABILITY: FOOD INSECURITY: WITHIN THE PAST 12 MONTHS, THE FOOD YOU BOUGHT JUST DIDN'T LAST AND YOU DIDN'T HAVE MONEY TO GET MORE.: NEVER TRUE

## 2022-02-09 ASSESSMENT — ENCOUNTER SYMPTOMS
HEARTBURN: 0
WEAKNESS: 0
PARESTHESIAS: 0
DYSURIA: 0
CONSTIPATION: 0
DIARRHEA: 0
SHORTNESS OF BREATH: 0
HEMATURIA: 0
MYALGIAS: 0
DIZZINESS: 0
EYE PAIN: 0
PALPITATIONS: 0
ABDOMINAL PAIN: 0
NAUSEA: 0
NERVOUS/ANXIOUS: 0
CHILLS: 0
HEADACHES: 0
JOINT SWELLING: 0
ARTHRALGIAS: 0
SORE THROAT: 0
FEVER: 0
COUGH: 0
FREQUENCY: 0
HEMATOCHEZIA: 0

## 2022-02-09 ASSESSMENT — LIFESTYLE VARIABLES
HOW MANY STANDARD DRINKS CONTAINING ALCOHOL DO YOU HAVE ON A TYPICAL DAY: PATIENT DECLINED
HOW OFTEN DO YOU HAVE A DRINK CONTAINING ALCOHOL: NEVER
HOW OFTEN DO YOU HAVE SIX OR MORE DRINKS ON ONE OCCASION: NEVER

## 2022-02-09 ASSESSMENT — SOCIAL DETERMINANTS OF HEALTH (SDOH)
IN A TYPICAL WEEK, HOW MANY TIMES DO YOU TALK ON THE PHONE WITH FAMILY, FRIENDS, OR NEIGHBORS?: ONCE A WEEK
DO YOU BELONG TO ANY CLUBS OR ORGANIZATIONS SUCH AS CHURCH GROUPS UNIONS, FRATERNAL OR ATHLETIC GROUPS, OR SCHOOL GROUPS?: NO
HOW OFTEN DO YOU ATTEND CHURCH OR RELIGIOUS SERVICES?: PATIENT DECLINED

## 2022-02-09 ASSESSMENT — ACTIVITIES OF DAILY LIVING (ADL): CURRENT_FUNCTION: NO ASSISTANCE NEEDED

## 2022-02-09 NOTE — ASSESSMENT & PLAN NOTE
Statin therapy is to neurology.  Dose was reduced at our last recorded visit.  Reassess cardiac risk

## 2022-02-09 NOTE — PROGRESS NOTES
"SUBJECTIVE:   Vladimir Morse is a 66 year old male who presents for Preventive Visit.      Patient has been advised of split billing requirements and indicates understanding: Yes  Are you in the first 12 months of your Medicare coverage?  Yes,  Visual Acuity:  Right Eye: 20/40   Left Eye: 20/160  Both Eyes: 20/50    Healthy Habits:     In general, how would you rate your overall health?  Good    Frequency of exercise:  6-7 days/week    Duration of exercise:  30-45 minutes    Do you usually eat at least 4 servings of fruit and vegetables a day, include whole grains    & fiber and avoid regularly eating high fat or \"junk\" foods?  Yes    Taking medications regularly:  Yes    Medication side effects:  None    Ability to successfully perform activities of daily living:  No assistance needed    Home Safety:  No safety concerns identified    Hearing Impairment:  Difficulty following a conversation in a noisy restaurant or crowded room, feel that people are mumbling or not speaking clearly, difficulty following dialogue in the theater, difficult to understand a speaker at a public meeting or Church service, need to ask people to speak up or repeat themselves, find that men's voices are easier to understand than woman's, difficulty understanding soft or whispered speech and difficulty understanding speech on the telephone    In the past 6 months, have you been bothered by leaking of urine?  No    In general, how would you rate your overall mental or emotional health?  Good      PHQ-2 Total Score: 0    Additional concerns today:  Yes    Do you feel safe in your environment? Yes    Have you ever done Advance Care Planning? (For example, a Health Directive, POLST, or a discussion with a medical provider or your loved ones about your wishes): No, advance care planning information given to patient to review.  Patient plans to discuss their wishes with loved ones or provider.         Fall risk  Fallen 2 or more times in the " past year?: No  Any fall with injury in the past year?: No    Cognitive Screening   1) Repeat 3 items (Leader, Season, Table)    2) Clock draw: NORMAL  3) 3 item recall: Recalls 2 objects   Results: NORMAL clock, 1-2 items recalled: COGNITIVE IMPAIRMENT LESS LIKELY    Mini-CogTM Copyright JACK Graham. Licensed by the author for use in [facility]; reprinted with permission (maggie@[facility]). All rights reserved.      Do you have sleep apnea, excessive snoring or daytime drowsiness?: no    Reviewed and updated as needed this visit by clinical staff  Tobacco  Allergies  Meds   Med Hx  Surg Hx  Fam Hx  Soc Hx       Reviewed and updated as needed this visit by Provider    Meds            Social History     Tobacco Use     Smoking status: Never Smoker     Smokeless tobacco: Never Used   Substance Use Topics     Alcohol use: Yes     Comment: rarely         Alcohol Use 2/9/2022   Prescreen: >3 drinks/day or >7 drinks/week? Not Applicable   Prescreen: >3 drinks/day or >7 drinks/week? -               Current providers sharing in care for this patient include:   Patient Care Team:  Reyes Hinkle MD as PCP - General (Family Practice)  Reyes Hinkle MD as Assigned PCP  Clifton Salinas MD as Assigned Surgical Provider    The following health maintenance items are reviewed in Epic and correct as of today:  Health Maintenance Due   Topic Date Due     PSA  05/16/2021     FALL RISK ASSESSMENT  08/31/2021     LIPID  10/05/2021               Review of Systems   Constitutional: Negative for chills and fever.   HENT: Positive for hearing loss. Negative for congestion, ear pain and sore throat.    Eyes: Negative for pain and visual disturbance.   Respiratory: Negative for cough and shortness of breath.    Cardiovascular: Negative for chest pain, palpitations and peripheral edema.   Gastrointestinal: Negative for abdominal pain, constipation, diarrhea, heartburn, hematochezia and nausea.   Genitourinary: Positive for  "impotence. Negative for dysuria, frequency, genital sores, hematuria and urgency.   Musculoskeletal: Negative for arthralgias, joint swelling and myalgias.   Skin: Negative for rash.   Neurological: Negative for dizziness, weakness, headaches and paresthesias.   Psychiatric/Behavioral: Negative for mood changes. The patient is not nervous/anxious.          OBJECTIVE:   /76 (BP Location: Right arm, Patient Position: Chair, Cuff Size: Adult Regular)   Pulse 65   Temp 98.3  F (36.8  C) (Oral)   Wt 80.8 kg (178 lb 3 oz)   SpO2 99%   BMI 27.09 kg/m   Estimated body mass index is 27.09 kg/m  as calculated from the following:    Height as of 7/13/21: 1.727 m (5' 8\").    Weight as of this encounter: 80.8 kg (178 lb 3 oz).  Physical Exam  Constitutional:       Appearance: Normal appearance.   HENT:      Head: Atraumatic.      Right Ear: Tympanic membrane normal.      Left Ear: Tympanic membrane normal.      Nose: Nose normal.      Mouth/Throat:      Mouth: Mucous membranes are moist.   Eyes:      Pupils: Pupils are equal, round, and reactive to light.   Cardiovascular:      Rate and Rhythm: Normal rate and regular rhythm.      Pulses: Normal pulses.      Heart sounds: Normal heart sounds.   Pulmonary:      Effort: Pulmonary effort is normal.      Breath sounds: Normal breath sounds.   Abdominal:      General: Abdomen is flat. Bowel sounds are normal.   Musculoskeletal:      Cervical back: Neck supple.   Skin:     General: Skin is warm and dry.   Neurological:      General: No focal deficit present.      Mental Status: He is alert.   Psychiatric:         Mood and Affect: Mood normal.               ASSESSMENT / PLAN:     Problem List Items Addressed This Visit     Basilar artery syndrome     He is following with neurology already has an appointment scheduled.  Ramipril atorvastatin and aspirin are for this problem.  There was no aneurysm he has no symptoms         Essential hypertension, benign     Controlled on " "ramipril.  Continue         Relevant Medications    ramipril (ALTACE) 10 MG capsule    Hyperlipidemia     Statin therapy is to neurology.  Dose was reduced at our last recorded visit.  Reassess cardiac risk         Relevant Orders    Lipid panel reflex to direct LDL Non-fasting    Presbycusis     He has been told he should get hearing aids, he has not yet         Special screening for malignant neoplasm of prostate     He is due in the summer         Relevant Orders    PSA, screen    Vasculogenic erectile dysfunction     He does not desire refills of sildenafil nor any additional interventions         Weight loss     His diet has changed since his bowel obstruction.  No fried foods different vegetables.  He has no other symptoms.  Exam is unrevealing.  Broaden database, otherwise positive strokes         Relevant Orders    Comprehensive metabolic panel (BMP + Alb, Alk Phos, ALT, AST, Total. Bili, TP)    TSH with free T4 reflex    CBC with platelets and differential    Protein electrophoresis    UA Macro with Reflex to Micro and Culture - lab collect    Protein immunofixation urine      Other Visit Diagnoses     Encounter for Medicare annual wellness exam    -  Primary              COUNSELING:  Reviewed preventive health counseling, as reflected in patient instructions    Estimated body mass index is 27.09 kg/m  as calculated from the following:    Height as of 7/13/21: 1.727 m (5' 8\").    Weight as of this encounter: 80.8 kg (178 lb 3 oz).    Weight management plan: Positive strokes for dietary change    He reports that he has never smoked. He has never used smokeless tobacco.      Appropriate preventive services were discussed with this patient, including applicable screening as appropriate for cardiovascular disease, diabetes, osteopenia/osteoporosis, and glaucoma.  As appropriate for age/gender, discussed screening for colorectal cancer, prostate cancer, breast cancer, and cervical cancer. Checklist reviewing " preventive services available has been given to the patient.    Reviewed patients plan of care and provided an AVS. The Basic Care Plan (routine screening as documented in Health Maintenance) for Vladimir meets the Care Plan requirement. This Care Plan has been established and reviewed with the Patient.    Counseling Resources:  ATP IV Guidelines  Pooled Cohorts Equation Calculator  Breast Cancer Risk Calculator  Breast Cancer: Medication to Reduce Risk  FRAX Risk Assessment  ICSI Preventive Guidelines  Dietary Guidelines for Americans, 2010  Ombud's MyPlate  ASA Prophylaxis  Lung CA Screening    Reyes Hinkle MD  [facility]    Identified Health Risks:

## 2022-02-09 NOTE — ASSESSMENT & PLAN NOTE
He is following with neurology already has an appointment scheduled.  Ramipril atorvastatin and aspirin are for this problem.  There was no aneurysm he has no symptoms

## 2022-02-09 NOTE — ASSESSMENT & PLAN NOTE
His diet has changed since his bowel obstruction.  No fried foods different vegetables.  He has no other symptoms.  Exam is unrevealing.  Broaden database, otherwise positive strokes

## 2022-02-09 NOTE — PATIENT INSTRUCTIONS
Patient Education   Personalized Prevention Plan  You are due for the preventive services outlined below.  Your care team is available to assist you in scheduling these services.  If you have already completed any of these items, please share that information with your care team to update in your medical record.  Health Maintenance Due   Topic Date Due     Discuss Advance Care Planning  08/16/2017     Annual Wellness Visit  08/02/2020     FALL RISK ASSESSMENT  08/31/2021     Cholesterol Lab  10/05/2021

## 2022-02-10 DIAGNOSIS — D47.2 MONOCLONAL GAMMOPATHY: Primary | ICD-10-CM

## 2022-02-10 LAB
ALBUMIN SERPL ELPH-MCNC: 4.6 G/DL (ref 3.7–5.1)
ALPHA1 GLOB SERPL ELPH-MCNC: 0.3 G/DL (ref 0.2–0.4)
ALPHA2 GLOB SERPL ELPH-MCNC: 0.7 G/DL (ref 0.5–0.9)
B-GLOBULIN SERPL ELPH-MCNC: 0.8 G/DL (ref 0.6–1)
GAMMA GLOB SERPL ELPH-MCNC: 1.1 G/DL (ref 0.7–1.6)
M PROTEIN SERPL ELPH-MCNC: 0 G/DL
PROT ELPH PNL UR ELPH: NORMAL
PROT PATTERN SERPL ELPH-IMP: NORMAL

## 2022-02-15 ENCOUNTER — PRE VISIT (OUTPATIENT)
Dept: ONCOLOGY | Facility: CLINIC | Age: 67
End: 2022-02-15
Payer: COMMERCIAL

## 2022-02-15 NOTE — TELEPHONE ENCOUNTER
RECORDS STATUS - ALL OTHER DIAGNOSIS      RECORDS RECEIVED FROM: [facility]   DATE RECEIVED: 02/15/22   NOTES STATUS DETAILS   OFFICE NOTE from referring provider Epic 02/09/22: Dr. Carlos Hinkle   OFFICE NOTE from medical oncologist     DISCHARGE SUMMARY from hospital     DISCHARGE REPORT from the ER     OPERATIVE REPORT     MEDICATION LIST [facility]    CLINICAL TRIAL TREATMENTS TO DATE     LABS     PATHOLOGY REPORTS     ANYTHING RELATED TO DIAGNOSIS Epic Most recent labs 02/09/22   GENONOMIC TESTING     TYPE:     IMAGING (NEED IMAGES & REPORT)     CT SCANS     MRI     MAMMO     ULTRASOUND     PET

## 2022-02-16 ENCOUNTER — TELEPHONE (OUTPATIENT)
Dept: FAMILY MEDICINE | Facility: CLINIC | Age: 67
End: 2022-02-16
Payer: COMMERCIAL

## 2022-02-16 DIAGNOSIS — I20.9 ANGINA PECTORIS (H): Primary | ICD-10-CM

## 2022-02-16 NOTE — TELEPHONE ENCOUNTER
Patient calling requesting order for stress test. Patient was seen in ED in [place] and was told to have a stress test within 2 days. Please review and advise on placement of order.     Juan GRAF RN

## 2022-02-16 NOTE — TELEPHONE ENCOUNTER
TPC with patient.  In emergency department Narinder falls with chest pain nausea and dyspnea.  Responsive, resolved with nitroglycerin.  We have no records.  However we have been requested by phone to arrange a stress test.  He believes he will able to walk on a treadmill.  We shall proceed    The 10-year ASCVD risk score (Vickymark ALLEN Jr., et al., 2013) is: 11.3%    Values used to calculate the score:      Age: 66 years      Sex: Male      Is Non- : No      Diabetic: No      Tobacco smoker: No      Systolic Blood Pressure: 128 mmHg      Is BP treated: Yes      HDL Cholesterol: 60 mg/dL      Total Cholesterol: 138 mg/dL    Reyes Hinkle MD

## 2022-02-17 NOTE — TELEPHONE ENCOUNTER
Pt has an appointment for a NM MPI Treadmill test 2/18 @ 9am.  For a stress echo you will need to put and order into epic and they will call the pt to make an appointment.    Pt has not been informed about the NM MPI Treadmill test.    [facility] [phone]  [address]  [place], [place]    Dorie-Referrals

## 2022-02-18 ENCOUNTER — HOSPITAL ENCOUNTER (OUTPATIENT)
Dept: NUCLEAR MEDICINE | Facility: CLINIC | Age: 67
Setting detail: NUCLEAR MEDICINE
End: 2022-02-18
Attending: FAMILY MEDICINE
Payer: COMMERCIAL

## 2022-02-18 ENCOUNTER — HOSPITAL ENCOUNTER (OUTPATIENT)
Dept: CARDIOLOGY | Facility: CLINIC | Age: 67
End: 2022-02-18
Attending: FAMILY MEDICINE
Payer: COMMERCIAL

## 2022-02-18 DIAGNOSIS — I20.9 ANGINA PECTORIS (H): Primary | ICD-10-CM

## 2022-02-18 DIAGNOSIS — I99.8 ISCHEMIA: ICD-10-CM

## 2022-02-18 DIAGNOSIS — I20.9 ANGINA PECTORIS (H): ICD-10-CM

## 2022-02-18 LAB
CV STRESS MAX HR HE: 151
RATE PRESSURE PRODUCT: NORMAL
STRESS ECHO BASELINE DIASTOLIC HE: 80
STRESS ECHO BASELINE HR: 75 BPM
STRESS ECHO BASELINE SYSTOLIC BP: 144
STRESS ECHO CALCULATED PERCENT HR: 98 %
STRESS ECHO LAST STRESS DIASTOLIC BP: 80
STRESS ECHO LAST STRESS SYSTOLIC BP: 184
STRESS ECHO POST ESTIMATED WORKLOAD: 10 METS
STRESS ECHO POST EXERCISE DUR MIN: 9 MIN
STRESS ECHO POST EXERCISE DUR SEC: 0 SEC
STRESS ECHO TARGET HR: 154

## 2022-02-18 PROCEDURE — 93018 CV STRESS TEST I&R ONLY: CPT | Performed by: INTERNAL MEDICINE

## 2022-02-18 PROCEDURE — 93016 CV STRESS TEST SUPVJ ONLY: CPT | Performed by: INTERNAL MEDICINE

## 2022-02-18 PROCEDURE — 78452 HT MUSCLE IMAGE SPECT MULT: CPT

## 2022-02-18 PROCEDURE — 93017 CV STRESS TEST TRACING ONLY: CPT

## 2022-02-18 PROCEDURE — A9502 TC99M TETROFOSMIN: HCPCS | Performed by: FAMILY MEDICINE

## 2022-02-18 PROCEDURE — 343N000001 HC RX 343: Performed by: FAMILY MEDICINE

## 2022-02-18 PROCEDURE — 78452 HT MUSCLE IMAGE SPECT MULT: CPT | Mod: 26 | Performed by: INTERNAL MEDICINE

## 2022-02-18 RX ADMIN — TETROFOSMIN 10 MCI.: 1.38 INJECTION, POWDER, LYOPHILIZED, FOR SOLUTION INTRAVENOUS at 09:30

## 2022-02-18 RX ADMIN — TETROFOSMIN 29 MCI.: 1.38 INJECTION, POWDER, LYOPHILIZED, FOR SOLUTION INTRAVENOUS at 11:15

## 2022-02-22 ENCOUNTER — HOSPITAL ENCOUNTER (OUTPATIENT)
Dept: CARDIOLOGY | Facility: CLINIC | Age: 67
Discharge: HOME | End: 2022-02-22
Attending: NURSE PRACTITIONER | Admitting: NURSE PRACTITIONER
Payer: COMMERCIAL

## 2022-02-22 ENCOUNTER — VIRTUAL VISIT (OUTPATIENT)
Dept: CARDIOLOGY | Facility: CLINIC | Age: 67
End: 2022-02-22
Attending: NURSE PRACTITIONER
Payer: COMMERCIAL

## 2022-02-22 DIAGNOSIS — I42.8 OTHER CARDIOMYOPATHY (H): Primary | ICD-10-CM

## 2022-02-22 DIAGNOSIS — R07.89 ATYPICAL CHEST PAIN: ICD-10-CM

## 2022-02-22 DIAGNOSIS — I20.9 ANGINA PECTORIS (H): ICD-10-CM

## 2022-02-22 DIAGNOSIS — I99.8 ISCHEMIA: ICD-10-CM

## 2022-02-22 LAB — BI-PLANE LVEF ECHO: NORMAL

## 2022-02-22 PROCEDURE — 999N000208 ECHOCARDIOGRAM COMPLETE

## 2022-02-22 PROCEDURE — 255N000002 HC RX 255 OP 636: Performed by: NURSE PRACTITIONER

## 2022-02-22 PROCEDURE — 93306 TTE W/DOPPLER COMPLETE: CPT | Mod: 26 | Performed by: INTERNAL MEDICINE

## 2022-02-22 PROCEDURE — 99203 OFFICE O/P NEW LOW 30 MIN: CPT | Mod: 95 | Performed by: INTERNAL MEDICINE

## 2022-02-22 RX ADMIN — HUMAN ALBUMIN MICROSPHERES AND PERFLUTREN 3 ML: 10; .22 INJECTION, SOLUTION INTRAVENOUS at 08:52

## 2022-02-22 NOTE — LETTER
"2/22/2022    Reyes Hinkle MD  [id number]  [facility] [id number]    RE: Vladimir Morse       Dear Colleague,     I had the pleasure of seeing Vladimir Morse in the [facility].  Pt mentioned: What was the reason for the chest pain? Is there any improvement on the ejection fraction? What is the next step from here?      Vitals - Patient Reported  Systolic (Patient Reported): (!) 148  Diastolic (Patient Reported): 88  Weight (Patient Reported): 79.4 kg (175 lb)  Height (Patient Reported): 172.7 cm (5' 8\")  BMI (Based on Pt Reported Ht/Wt): 26.61  Pulse (Patient Reported): 60    Review Of Systems  Skin: negative  Eyes: cataracts, glasses  Ears/Nose/Throat: hearing loss  Respiratory: No shortness of breath, dyspnea on exertion, cough, or hemoptysis  Cardiovascular: negative  Gastrointestinal: negative  Genitourinary: negative  Musculoskeletal: negative  Neurologic: negative  Psychiatric: sleep disturbance and anxiety  Hematologic/Lymphatic/Immunologic: negative  Endocrine: negative    Reviewed by: KENYON Bledsoe     HISTORY:    Vladimir Morse is a pleasant 66-year-old male whom I had a telephone visit with today because of inability to tablets a video visit.  His wife was present with the meeting as well.  Juan is generally healthy gentleman with a history of treated hypertension and treated hyperlipidemia.  He was asked to see me after an episode of chest pain.    I days ago Juan developed chest discomfort.  He states that the episode occurred in the morning after he had walked upstairs and started taking a shower.  His pain started sometime as he was walking up stairs or undressing and got worse as he was showering.  He describes this as a chest pressure that was located to the left of center and did not radiate elsewhere.  He was nauseated and short of breath and lightheaded with it but was not diaphoretic prior to getting into the shower.  He presented to the emergency room where serial " troponins were negative and an ECG was read as normal.  I do not have that ECG for personal review.  He was given 3 nitroglycerin, each of which seem to further improve his symptoms.    A nuclear stress test was arranged and that study showed a small area of mild ischemia in the inferior wall with a mildly reduced ejection fraction.  An echocardiogram was then done confirmed a mildly reduced ejection fraction of 45% with inferior hypokinesis, without other significant abnormalities.    Vladimir reports that he had never experienced similar pain in the past except he thinks perhaps well over a decade ago he vaguely remembers having something similar.  In the recent past he has not had similar discomfort and he specifically denies exertional chest, arm, neck, or jaw discomfort as well as symptoms of palpitations, PND/orthopnea, syncope or near syncope, orthostasis, peripheral edema, or claudication.  Overall he feels that his stamina is normal.  Cardiac risk factors include treated hypertension and treated hyperlipidemia.  The most recent lipid profile done 2 weeks ago showed a total cholesterol of 138 with HDL of 60 and LDL of 63 using Lipitor 20 mg daily.  His blood pressures in the office have been well controlled using ramipril 10 mg daily.      ASSESSMENT/PLAN:    1.  Mild cardiomyopathy.  This was demonstrated both by nuclear stress test and echocardiography.  The nuclear stress test showed a small area of mild ischemia which would not explain his reduced ejection fraction, and his echo showed inferior hypokinesis.  His ECG did not suggest a previous inferior infarct (although the tracing itself is not available for review).  He did come in with episode of chest discomfort but it sounds unlikely that this was of cardiac origin since his troponins drawn serially were normal.  I think we need to definitively exclude coronary artery disease and I talked to him about either having a CT angiogram versus a regular  angiogram.  I think a CT is the safer choice and likely to be definitive.  He is in agreement with this.  I talked about risks and benefits and he wishes to proceed.  I will make arrangements for that to happen.  We will contact him with those results.  We will also likely start him on a beta-blocker because of his mildly reduced ejection fraction but I would like to see him in person for further discussion after his angiogram.    Thank you for inviting me to participate in your patient's care.  Please do not hesitate to call if I can be of further assistance    Chart documentation was completed, in part, with [employer] voice-recognition software. Even though reviewed, some grammatical, spelling, and word errors may remain.         Rodrigue Pires MD   [facility]      cc:   VERO Rosales CNP  [address] [place] [address]

## 2022-02-22 NOTE — PROGRESS NOTES
"Vladimir is a 66 year old who is being evaluated via a billable video visit.      How would you like to obtain your AVS? MyChart  If the video visit is dropped, the invitation should be resent by: Send to e-mail at: [email or web address]  Will anyone else be joining your video visit? No       Pt mentioned: What was the reason for the chest pain? Is there any improvement on the ejection fraction? What is the next step from here?      Vitals - Patient Reported  Systolic (Patient Reported): (!) 148  Diastolic (Patient Reported): 88  Weight (Patient Reported): 79.4 kg (175 lb)  Height (Patient Reported): 172.7 cm (5' 8\")  BMI (Based on Pt Reported Ht/Wt): 26.61  Pulse (Patient Reported): 60    Review Of Systems  Skin: negative  Eyes: cataracts, glasses  Ears/Nose/Throat: hearing loss  Respiratory: No shortness of breath, dyspnea on exertion, cough, or hemoptysis  Cardiovascular: negative  Gastrointestinal: negative  Genitourinary: negative  Musculoskeletal: negative  Neurologic: negative  Psychiatric: sleep disturbance and anxiety  Hematologic/Lymphatic/Immunologic: negative  Endocrine: negative    Reviewed by: KENYON Bledsoe     HISTORY:    Vladimir Morse is a pleasant 66-year-old male whom I had a telephone visit with today because of inability to tablets a video visit.  His wife was present with the meeting as well.  Juan is generally healthy gentleman with a history of treated hypertension and treated hyperlipidemia.  He was asked to see me after an episode of chest pain.    I days ago Juan developed chest discomfort.  He states that the episode occurred in the morning after he had walked upstairs and started taking a shower.  His pain started sometime as he was walking up stairs or undressing and got worse as he was showering.  He describes this as a chest pressure that was located to the left of center and did not radiate elsewhere.  He was nauseated and short of breath and lightheaded with it but was not diaphoretic " prior to getting into the shower.  He presented to the emergency room where serial troponins were negative and an ECG was read as normal.  I do not have that ECG for personal review.  He was given 3 nitroglycerin, each of which seem to further improve his symptoms.    A nuclear stress test was arranged and that study showed a small area of mild ischemia in the inferior wall with a mildly reduced ejection fraction.  An echocardiogram was then done confirmed a mildly reduced ejection fraction of 45% with inferior hypokinesis, without other significant abnormalities.    Vladimir reports that he had never experienced similar pain in the past except he thinks perhaps well over a decade ago he vaguely remembers having something similar.  In the recent past he has not had similar discomfort and he specifically denies exertional chest, arm, neck, or jaw discomfort as well as symptoms of palpitations, PND/orthopnea, syncope or near syncope, orthostasis, peripheral edema, or claudication.  Overall he feels that his stamina is normal.  Cardiac risk factors include treated hypertension and treated hyperlipidemia.  The most recent lipid profile done 2 weeks ago showed a total cholesterol of 138 with HDL of 60 and LDL of 63 using Lipitor 20 mg daily.  His blood pressures in the office have been well controlled using ramipril 10 mg daily.      ASSESSMENT/PLAN:    1.  Mild cardiomyopathy.  This was demonstrated both by nuclear stress test and echocardiography.  The nuclear stress test showed a small area of mild ischemia which would not explain his reduced ejection fraction, and his echo showed inferior hypokinesis.  His ECG did not suggest a previous inferior infarct (although the tracing itself is not available for review).  He did come in with episode of chest discomfort but it sounds unlikely that this was of cardiac origin since his troponins drawn serially were normal.  I think we need to definitively exclude coronary artery  disease and I talked to him about either having a CT angiogram versus a regular angiogram.  I think a CT is the safer choice and likely to be definitive.  He is in agreement with this.  I talked about risks and benefits and he wishes to proceed.  I will make arrangements for that to happen.  We will contact him with those results.  We will also likely start him on a beta-blocker because of his mildly reduced ejection fraction but I would like to see him in person for further discussion after his angiogram.    Thank you for inviting me to participate in your patient's care.  Please do not hesitate to call if I can be of further assistance    Chart documentation was completed, in part, with [employer] voice-recognition software. Even though reviewed, some grammatical, spelling, and word errors may remain.         Video Start Time: 1245  Video-Visit Details    Type of service:  Video Visit    Video End Time:1:26 PM    Originating Location (pt. Location): Home    Distant Location (provider location):  [facility]     Platform used for Video Visit: Unable to complete video visit

## 2022-02-23 ENCOUNTER — LAB (OUTPATIENT)
Dept: INFUSION THERAPY | Facility: CLINIC | Age: 67
End: 2022-02-23
Attending: INTERNAL MEDICINE
Payer: COMMERCIAL

## 2022-02-23 ENCOUNTER — HOSPITAL ENCOUNTER (OUTPATIENT)
Dept: RADIOLOGY | Facility: CLINIC | Age: 67
End: 2022-02-23
Attending: INTERNAL MEDICINE
Payer: COMMERCIAL

## 2022-02-23 ENCOUNTER — MYC MEDICAL ADVICE (OUTPATIENT)
Dept: FAMILY MEDICINE | Facility: CLINIC | Age: 67
End: 2022-02-23

## 2022-02-23 ENCOUNTER — ONCOLOGY VISIT (OUTPATIENT)
Dept: ONCOLOGY | Facility: CLINIC | Age: 67
End: 2022-02-23
Attending: FAMILY MEDICINE
Payer: COMMERCIAL

## 2022-02-23 VITALS
SYSTOLIC BLOOD PRESSURE: 142 MMHG | DIASTOLIC BLOOD PRESSURE: 87 MMHG | HEIGHT: 68 IN | WEIGHT: 177 LBS | OXYGEN SATURATION: 100 % | RESPIRATION RATE: 16 BRPM | HEART RATE: 63 BPM | BODY MASS INDEX: 26.83 KG/M2

## 2022-02-23 DIAGNOSIS — D47.2 MONOCLONAL GAMMOPATHY: ICD-10-CM

## 2022-02-23 LAB
IGA SERPL-MCNC: 127 MG/DL (ref 65–400)
IGG SERPL-MCNC: 1231 MG/DL (ref 700–1700)
IGM SERPL-MCNC: 65 MG/DL (ref 60–280)
LDH SERPL L TO P-CCNC: 172 U/L (ref 125–220)
TOTAL PROTEIN SERUM FOR ELP: 7.4 G/DL (ref 6–8)

## 2022-02-23 PROCEDURE — 83615 LACTATE (LD) (LDH) ENZYME: CPT | Performed by: INTERNAL MEDICINE

## 2022-02-23 PROCEDURE — 84155 ASSAY OF PROTEIN SERUM: CPT | Performed by: INTERNAL MEDICINE

## 2022-02-23 PROCEDURE — G0463 HOSPITAL OUTPT CLINIC VISIT: HCPCS

## 2022-02-23 PROCEDURE — 99205 OFFICE O/P NEW HI 60 MIN: CPT | Performed by: INTERNAL MEDICINE

## 2022-02-23 PROCEDURE — 77075 RADEX OSSEOUS SURVEY COMPL: CPT

## 2022-02-23 PROCEDURE — 83521 IG LIGHT CHAINS FREE EACH: CPT | Performed by: INTERNAL MEDICINE

## 2022-02-23 PROCEDURE — 86334 IMMUNOFIX E-PHORESIS SERUM: CPT | Mod: 26 | Performed by: PATHOLOGY

## 2022-02-23 PROCEDURE — 82784 ASSAY IGA/IGD/IGG/IGM EACH: CPT | Performed by: INTERNAL MEDICINE

## 2022-02-23 PROCEDURE — 84165 PROTEIN E-PHORESIS SERUM: CPT | Mod: 26 | Performed by: PATHOLOGY

## 2022-02-23 PROCEDURE — 86334 IMMUNOFIX E-PHORESIS SERUM: CPT | Performed by: INTERNAL MEDICINE

## 2022-02-23 PROCEDURE — 82232 ASSAY OF BETA-2 PROTEIN: CPT | Performed by: INTERNAL MEDICINE

## 2022-02-23 PROCEDURE — 84165 PROTEIN E-PHORESIS SERUM: CPT | Mod: TC | Performed by: INTERNAL MEDICINE

## 2022-02-23 PROCEDURE — 36415 COLL VENOUS BLD VENIPUNCTURE: CPT | Performed by: INTERNAL MEDICINE

## 2022-02-23 ASSESSMENT — PAIN SCALES - GENERAL: PAINLEVEL: NO PAIN (0)

## 2022-02-23 NOTE — PROGRESS NOTES
[facility] Hematology and Oncology Consult Note    Patient: Vladimir Morse  MRN: [id number]  Date of Service: Feb 23, 2022       Reason for Visit    I was consulted by Dr. Hinkle regarding monoclonal gammopathy in the urine.    Assessment/Plan    Mr. Vladimir Morse is a 66 year old gentleman who had labs done as part of the Medicare wellness visit on 2/9/2022.  The urine immunofixation showed some free monoclonal kappa light chains.  At the same time he had a serum electrophoresis done which did not  anything.  An ordinary urinalysis did not show significant proteinuria.  BC differential platelets were completely normal with a hemoglobin of 15.7, platelet count of 240,000 and a white count of 7.1 with a completely normal differential.  CMP showed a creatinine of 0.7, normal electrolytes and liver enzymes and proteins.  Bilirubin was slightly above normal at 2.4 as expected for a patient who has Gilbert's disease.  This does not really appear changed compared to the previous LFTs that he has had done.    Reviewing the previous imaging studies, he had an MRI of the brain done on 7/29/2021 with [facility] to evaluate the hearing loss.  No particular brain lesion was noted.  More importantly they did not mention any bone lesions consistent with myeloma.  He had a CT scan of the abdomen and pelvis done on 6/3/2021 when he was admitted to the hospital with bowel obstruction.  The images and report were reviewed and it did not show any hepatosplenomegaly or lymphadenopathy or suspicious bone lesion.    1.  I discussed with the patient and wife about the finding of the monoclonal gammopathy and I explained to them what the concern is.  The main thing ask of hematology and oncology is well at this signifies an underlying lymphoma or myeloma.  The findings so far are reassuring but we will go for a systematic work-up to make sure that we are not missing anything significant.  They were agreeable to the plan.    2.  I  encouraged him to continue the recommended follow-up and treatment with cardiology for the chest pain that he had recently.    3.  Today we will obtain the following labs: We will repeat an SPEP and immunofixation with serum free light chains and quantitative hemoglobins.  We will obtain a beta-2 microglobulin level and an LDH level.  I do not think we need to repeat the blood counts and CMP.    4.  We are giving him a container and instructions to collect a 24-hour urine sample.  We will send the sample for electrophoresis, urine immunofixation and urine light chains.    5.  I am ordering an x-ray bone survey.  He will schedule it soon according to his convenience.    6.  Return to clinic with me after a week to discuss the results of the above work-up.  I discussed with him that if there is anything suspicious we may have to go for a biopsy like a bone marrow biopsy.  Otherwise he may need only follow-up.    7. Today I discussed with the patient that I will be leaving the practice and moving out of state.  The last day of work is March 17.  Arrangements will be made by [place] for continued follow-up with my colleagues if the need arises.  Questions were answered.    Time spend >66 minutes total time for the patient.       ECOG Performance    0 - Independent    Encounter Diagnoses:    Problem List Items Addressed This Visit        Immune    Monoclonal gammopathy    Relevant Orders    Beta 2 microglobulin    Immunoglobulins A G and M    Lactate Dehydrogenase    Protein electrophoresis    Protein Immunofixation Serum    Kappa and lambda light chain    XR Bone Survey Complete    Protein electrophoresis, timed urine    Immunoglobulin Total Light Chains, Urine    Protein immunofixation urine              ______________________________________________________________________________      History    Mr. Vladimir Morse is a 66 year old gentleman who is here for the consultation with his wife Terri.    He says that he  went for the Medicare wellness visit on 2/9/2022 and in the urine test was found to have a monoclonal protein.  He was referred to hematology and oncology.  He is not quite clear why the test was done.    He was in the ER with chest pain on 2/16/2022.  Apparently he had chest pressure after he went up a flight of stairs and was taking a shower.  He has had a stress test and an echo done.  A small area of ischemia has been found.  He is going to go for a CT angiogram.  He has already seen cardiology.    He is feeling well now.  He says that he is generally an active person but has not been able to be as active as usual because of the winter weather.    He has intentionally lost about 25 pounds of weight over the last for 5 years.    The only lumbar bump that he has noticed is a lipoma that he has had for a long time on his back.    Review of systems.  No fever or night sweats.  No unusual headaches or eyesight issues.  No dizziness.  No bleeding from the nose.  No sores in the mouth. No problems with swallowing.  No shortness of breath. No cough.  No abdominal pain. No nausea or vomiting.  No diarrhea or constipation.  No blood in stool or black colored stools.  No problems passing urine.  No numbness or tingling in hands or feet.  No skin rashes.  A 14 point review of systems is otherwise negative.        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     BPPV (benign paroxysmal positional vertigo), unspecified laterality 07/03/2015     Gilbert's syndrome 12/13/2016     Hyperlipidemia LDL goal <100 11/28/2014     Hypertension 02/22/2018     Lipoma of skin and subcutaneous tissue 06/22/2012     Nephrolithiasis 06/22/2012     Primary osteoarthritis of right knee 12/22/2017     Recurrent intestinal obstruction (H) 07/27/2012    Resolved with conservative management, again on 6/27/2014 & 5/24/2021.     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     Past Surgical History:   Procedure Laterality Date     ARTHROSCOPY KNEE Right 10/26/2015    Procedure: ARTHROSCOPY KNEE;  Surgeon: Rodrigue Cole MD;  Location: [facility] OR     AS TOTAL KNEE ARTHROPLASTY Right 03/05/2018     COLONOSCOPY  11/23/2015    Dr. Freedman [facility]     COLONOSCOPY N/A 07/13/2021    Procedure: COLONOSCOPY;  Surgeon: Luzmaria Ny MD;  Location:  GI     CYSTOSCOPY, RETROGRADES, INSERT STENT URETER(S), COMBINED  06/29/2012    Procedure: COMBINED CYSTOSCOPY, RETROGRADES, INSERT STENT URETER(S);  LEFT URETEROSCOPY, LEFT URETERAL STENT PLACEMENT;  Surgeon: Timothy Ortega MD;  Location:  OR     EXCISE LESION BACK Left 09/21/2018    Procedure: EXCISE LESION BACK;  Excision subcutaneous mass and skin lesion left back;  Surgeon: Prosper Loving MD;  Location:  OR. Path: lipoma.     EXTRACORPOREAL SHOCK WAVE LITHOTRIPSY (ESWL)  12/20/2012    Procedure: EXTRACORPOREAL SHOCK WAVE LITHOTRIPSY (ESWL);  LEFT EXTRACORPOREAL SHOCKWAVE LITHOTRIPSY ;  Surgeon: Timothy Ortega MD;  Location: [facility] OR     HC KNEE ARTHROSCOPY, MED+LAT MENISCUS REPAIR Left 01/25/2010    Left knee diagnostic arthroscopy and removal of loose bodies and medial and lateral meniscal debridement.     PERONEAL NERVE DECOMPRESSION Left 02/11/2010    and Excision proximal tibiofibular joint ganglion cyst.     TONSILLECTOMY & ADENOIDECTOMY  1964    T&A as a child     Z NONSPECIFIC PROCEDURE  1955    Perforated bowel; infant. Ilio-colonic anastomosis?     Family History   Problem Relation Age of Onset     Breast Cancer Mother 55     Colon Cancer Father      Myocardial Infarction Father       Skin Cancer Father      Breast Cancer Sister 65     Cerebrovascular Disease Brother      Other - See Comments Brother         quadriplegia from an accident     Lung Cancer Brother 66        was a smokder.     Arthritis Brother      No Known Problems Brother      Nephrolithiasis Son      No Known Problems Son      Other - See Comments Son         hip impingement syndrome.     No Known Problems Daughter      Social History     Socioeconomic History     Marital status:      Spouse name: None     Number of children: None     Years of education: None     Highest education level: None   Occupational History     Occupation: Retired. Was a   for [employer].   Tobacco Use     Smoking status: Never Smoker     Smokeless tobacco: Never Used   Substance and Sexual Activity     Alcohol use: Yes     Comment: rarely     Drug use: No     Sexual activity: Yes     Partners: Female   Other Topics Concern     Parent/sibling w/ CABG, MI or angioplasty before 65F 55M? Not Asked   Social History Narrative     None     Social Determinants of Health     Financial Resource Strain: Low Risk      Difficulty of Paying Living Expenses: Not hard at all   Food Insecurity: No Food Insecurity     Worried About Running Out of Food in the Last Year: Never true     Ran Out of Food in the Last Year: Never true   Transportation Needs: No Transportation Needs     Lack of Transportation (Medical): No     Lack of Transportation (Non-Medical): No   Physical Activity: Sufficiently Active     Days of Exercise per Week: 7 days     Minutes of Exercise per Session: 30 min   Stress: Stress Concern Present     Feeling of Stress : To some extent   Social Connections: Unknown     Frequency of Communication with Friends and Family: Once a week     Frequency of Social Gatherings with Friends and Family: Not on file     Attends [employer] Services: Patient refused     Active Member of Clubs or Organizations: No     Attends Club or  "Organization Meetings: Not on file     Marital Status:    Intimate Partner Violence: Not on file   Housing Stability: Low Risk      Unable to Pay for Housing in the Last Year: No     Number of Places Lived in the Last Year: 0     Unstable Housing in the Last Year: No         Allergies    Allergies   Allergen Reactions     No Known Drug Allergies            Physical Exam    BP (!) 142/87 (BP Location: Left arm, Patient Position: Sitting, Cuff Size: Adult Regular)   Pulse 63   Resp 16   Ht 1.727 m (5' 8\")   Wt 80.3 kg (177 lb)   SpO2 100%   BMI 26.91 kg/m        GENERAL: Alert and oriented to time place and person. Seated comfortably. In no distress.  He looks well overall.  Thin build.      HEAD: Atraumatic and normocephalic.    EYES: KHURRAM, EOMI.  No pallor.  No icterus.    Oral cavity: He is wearing a mask.    NECK: supple. JVP normal.  No thyroid enlargement.    LYMPH NODES: No palpable, cervical, axillary or inguinal lymphadenopathy.    CHEST: clear to auscultation bilaterally.  Resonant to percussion throughout bilaterally.  Symmetrical breath movements bilaterally.    CVS: S1 and S2 are heard. Regular rate and rhythm.  No murmur or gallop or rub heard.  No peripheral edema.    ABDOMEN: Soft. Not tender. Not distended.  No palpable hepatomegaly or splenomegaly.  No other mass palpable.  Bowel sounds heard.    EXTREMITIES: Warm.    SKIN: no rash, or bruising or purpura.  Male pattern baldness.    Lab Results    Office Visit on 02/09/2022   Component Date Value Ref Range Status     Sodium 02/09/2022 141  133 - 144 mmol/L Final     Potassium 02/09/2022 4.3  3.4 - 5.3 mmol/L Final     Chloride 02/09/2022 109  94 - 109 mmol/L Final     Carbon Dioxide (CO2) 02/09/2022 30  20 - 32 mmol/L Final     Anion Gap 02/09/2022 2 (A) 3 - 14 mmol/L Final     Urea Nitrogen 02/09/2022 17  7 - 30 mg/dL Final     Creatinine 02/09/2022 0.70  0.66 - 1.25 mg/dL Final     Calcium 02/09/2022 9.1  8.5 - 10.1 mg/dL Final     " Glucose 02/09/2022 90  70 - 99 mg/dL Final     Alkaline Phosphatase 02/09/2022 103  40 - 150 U/L Final     AST 02/09/2022 21  0 - 45 U/L Final     ALT 02/09/2022 44  0 - 70 U/L Final     Protein Total 02/09/2022 7.8  6.8 - 8.8 g/dL Final     Albumin 02/09/2022 4.1  3.4 - 5.0 g/dL Final     Bilirubin Total 02/09/2022 2.4 (A) 0.2 - 1.3 mg/dL Final     GFR Estimate 02/09/2022 >90  >60 mL/min/1.73m2 Final     Prostate Specific Antigen Screen 02/09/2022 2.23  0.00 - 4.00 ug/L Final     Cholesterol 02/09/2022 138  <200 mg/dL Final     Triglycerides 02/09/2022 77  <150 mg/dL Final     Direct Measure HDL 02/09/2022 60  >=40 mg/dL Final     LDL Cholesterol Calculated 02/09/2022 63  <=100 mg/dL Final     Non HDL Cholesterol 02/09/2022 78  <130 mg/dL Final     Patient Fasting > 8hrs? 02/09/2022 Unknown   Final     TSH 02/09/2022 1.84  0.40 - 4.00 mU/L Final     Color Urine 02/09/2022 Yellow  Colorless, Straw, Light Yellow, Yellow Final     Appearance Urine 02/09/2022 Clear  Clear Final     Glucose Urine 02/09/2022 Negative  Negative mg/dL Final     Bilirubin Urine 02/09/2022 Negative  Negative Final     Ketones Urine 02/09/2022 Negative  Negative mg/dL Final     Specific Gravity Urine 02/09/2022 1.025  1.003 - 1.035 Final     Blood Urine 02/09/2022 Negative  Negative Final     pH Urine 02/09/2022 7.0  5.0 - 7.0 Final     Protein Albumin Urine 02/09/2022 Negative  Negative mg/dL Final     Urobilinogen Urine 02/09/2022 1.0  0.2, 1.0 E.U./dL Final     Nitrite Urine 02/09/2022 Negative  Negative Final     Leukocyte Esterase Urine 02/09/2022 Negative  Negative Final     Immunofixation ELP Urine 02/09/2022 Monoclonal free immunoglobulin light chain of kappa chain type. Pathologic significance requires clinical correlation.  ANGELIQUE Kwan M.D., Ph.D., Pathologist ()   Final     WBC Count 02/09/2022 7.1  4.0 - 11.0 10e3/uL Final     RBC Count 02/09/2022 5.46  4.40 - 5.90 10e6/uL Final     Hemoglobin 02/09/2022 15.7  13.3  - 17.7 g/dL Final     Hematocrit 2022 49.0  40.0 - 53.0 % Final     MCV 2022 90  78 - 100 fL Final     MCH 2022 28.8  26.5 - 33.0 pg Final     MCHC 2022 32.0  31.5 - 36.5 g/dL Final     RDW 2022 14.0  10.0 - 15.0 % Final     Platelet Count 2022 240  150 - 450 10e3/uL Final     % Neutrophils 2022 67  % Final     % Lymphocytes 2022 24  % Final     % Monocytes 2022 6  % Final     % Eosinophils 2022 1  % Final     % Basophils 2022 0  % Final     Absolute Neutrophils 2022 4.8  1.6 - 8.3 10e3/uL Final     Absolute Lymphocytes 2022 1.7  0.8 - 5.3 10e3/uL Final     Absolute Monocytes 2022 0.5  0.0 - 1.3 10e3/uL Final     Absolute Eosinophils 2022 0.1  0.0 - 0.7 10e3/uL Final     Absolute Basophils 2022 0.0  0.0 - 0.2 10e3/uL Final     Total Protein Serum for ELP 2022 7.5  6.8 - 8.8 g/dL Final     Albumin 2022 4.6  3.7 - 5.1 g/dL Final     Alpha 1 2022 0.3  0.2 - 0.4 g/dL Final     Alpha 2 2022 0.7  0.5 - 0.9 g/dL Final     Beta Globulin 2022 0.8  0.6 - 1.0 g/dL Final     Gamma Globulin 2022 1.1  0.7 - 1.6 g/dL Final     Monoclonal Peak 2022 0.0  <=0.0 g/dL Final     ELP Interpretation 2022 Essentially normal electrophoretic pattern. No obvious monoclonal proteins seen. Pathologic significance requires clinical correlation. ANGELIQUE Kwan M.D., Ph.D., Pathologist ().   Final           Imaging Results    Echocardiogram Complete    Result Date: 2022  [id number]^JEAN MARIE^RUDY  [facility] [address] [place] [address]  Name: SERA GERMAIN MRN: [id number] : 1955 Study Date: 2022 08:26 AM Age: 66 yrs Gender: Male Patient Location: [facility] Reason For Study: Angina pectoris (H) Ordering Physician: RUDY AJ Referring Physician: RUDY AJ Performed By: Suze Dickinson  BSA:  1.9 m2 Height: 68 in Weight: 178 lb ______________________________________________________________________________ Procedure Complete Echo Adult. Optison (NDC #5058-7660) given intravenously. ______________________________________________________________________________ Interpretation Summary  Biplane LVEF is 45%. There is mild to moderate inferior and inferolateral wall hypokinesis. The right ventricle is normal in size and function. There is trace to mild aortic regurgitation. There is no pericardial effusion.  No prior studies for comparison. ______________________________________________________________________________ Left Ventricle The left ventricle is normal in size. There is normal left ventricular wall thickness. Biplane LVEF is 45%. Diastolic Doppler findings (E/E' ratio and/or other parameters) suggest left ventricular filling pressures are indeterminate. There is mild inferior and inferolateral wall hypokinesis.  Right Ventricle The right ventricle is normal in size and function.  Atria The left atrium is mildly dilated. Right atrial size is normal.  Mitral Valve There is mild mitral annular calcification. There is trace to mild mitral regurgitation. There is no mitral valve stenosis.  Tricuspid Valve The tricuspid valve is normal in structure and function. There is trace to mild tricuspid regurgitation. Right ventricular systolic pressure could not be approximated due to inadequate tricuspid regurgitation.  Aortic Valve The aortic valve is normal in structure and function. There is trace to mild aortic regurgitation. No hemodynamically significant valvular aortic stenosis.  Pulmonic Valve The pulmonic valve is not well visualized. There is trace pulmonic valvular regurgitation.  Vessels The aortic root is normal size. Normal size ascending aorta.  Pericardium There is no pericardial effusion.  ______________________________________________________________________________ MMode/2D Measurements &  Calculations IVSd: 0.80 cm LVIDd: 5.5 cm LVIDs: 4.1 cm LVPWd: 0.69 cm  FS: 25.5 % LV mass(C)d: 148.4 grams LV mass(C)dI: 76.3 grams/m2 Ao root diam: 3.0 cm asc Aorta Diam: 3.0 cm LVOT diam: 2.3 cm LVOT area: 4.0 cm2 LA Volume (BP): 73.0 ml LA Volume Index (BP): 37.4 ml/m2 RWT: 0.25  Doppler Measurements & Calculations MV E max valerie: 62.3 cm/sec MV A max valerie: 43.2 cm/sec MV E/A: 1.4 MV dec time: 0.14 sec PA acc time: 0.13 sec E/E' av.7 Lateral E/e': 6.4 Medial E/e': 13.0  ______________________________________________________________________________ Report approved by: Shama Arauz 2022 09:15 AM       NM MPI Treadmill    Result Date: 2022     The nuclear stress test is abnormal.    There is a small area of mild ischemia in the inferoapical segment(s) of the left ventricle.    Left ventricular function is low normal, EF 52% at rest, 48% wtih stress.    There is no prior study for comparison.  Recommend echocardiogram for formal assessment of LV function.         Signed by: Celine Fraser MD

## 2022-02-23 NOTE — LETTER
"    2/23/2022         RE: Vladimir Morse  [address] [place] [address]        Dear Colleague,    Thank you for referring your patient, Vladimir Morse, to the [facility] [place]. Please see a copy of my visit note below.    Oncology Rooming Note    February 23, 2022 12:51 PM   Vladimir Morse is a 66 year old male who presents for:    Chief Complaint   Patient presents with     Hematology     new consult      Initial Vitals: BP (!) 142/87 (BP Location: Left arm, Patient Position: Sitting, Cuff Size: Adult Regular)   Pulse 63   Resp 16   Ht 1.727 m (5' 8\")   Wt 80.3 kg (177 lb)   SpO2 100%   BMI 26.91 kg/m   Estimated body mass index is 26.91 kg/m  as calculated from the following:    Height as of this encounter: 1.727 m (5' 8\").    Weight as of this encounter: 80.3 kg (177 lb). Body surface area is 1.96 meters squared.  No Pain (0) Comment: Data Unavailable   No LMP for male patient.  Allergies reviewed: Yes  Medications reviewed: Yes    Medications: Medication refills not needed today.  Pharmacy name entered into [employer]: [facility] - [place], [place] - [address] [place] [address]    Clinical concerns: new consult       Marielle Ellington              [facility] Hematology and Oncology Consult Note    Patient: Vladimir Morse  MRN: [id number]  Date of Service: Feb 23, 2022       Reason for Visit    I was consulted by Dr. Hinkle regarding monoclonal gammopathy in the urine.    Assessment/Plan    Mr. Vladimir Morse is a 66 year old gentleman who had labs done as part of the Medicare wellness visit on 2/9/2022.  The urine immunofixation showed some free monoclonal kappa light chains.  At the same time he had a serum electrophoresis done which did not  anything.  An ordinary urinalysis did not show significant proteinuria.  BC differential platelets were completely normal with a hemoglobin of 15.7, platelet count of 240,000 and a white count of 7.1 with a completely normal " differential.  CMP showed a creatinine of 0.7, normal electrolytes and liver enzymes and proteins.  Bilirubin was slightly above normal at 2.4 as expected for a patient who has Gilbert's disease.  This does not really appear changed compared to the previous LFTs that he has had done.    Reviewing the previous imaging studies, he had an MRI of the brain done on 7/29/2021 with [facility] to evaluate the hearing loss.  No particular brain lesion was noted.  More importantly they did not mention any bone lesions consistent with myeloma.  He had a CT scan of the abdomen and pelvis done on 6/3/2021 when he was admitted to the hospital with bowel obstruction.  The images and report were reviewed and it did not show any hepatosplenomegaly or lymphadenopathy or suspicious bone lesion.    1.  I discussed with the patient and wife about the finding of the monoclonal gammopathy and I explained to them what the concern is.  The main thing ask of hematology and oncology is well at this signifies an underlying lymphoma or myeloma.  The findings so far are reassuring but we will go for a systematic work-up to make sure that we are not missing anything significant.  They were agreeable to the plan.    2.  I encouraged him to continue the recommended follow-up and treatment with cardiology for the chest pain that he had recently.    3.  Today we will obtain the following labs: We will repeat an SPEP and immunofixation with serum free light chains and quantitative hemoglobins.  We will obtain a beta-2 microglobulin level and an LDH level.  I do not think we need to repeat the blood counts and CMP.    4.  We are giving him a container and instructions to collect a 24-hour urine sample.  We will send the sample for electrophoresis, urine immunofixation and urine light chains.    5.  I am ordering an x-ray bone survey.  He will schedule it soon according to his convenience.    6.  Return to clinic with me after a week to discuss the results of  the above work-up.  I discussed with him that if there is anything suspicious we may have to go for a biopsy like a bone marrow biopsy.  Otherwise he may need only follow-up.    7. Today I discussed with the patient that I will be leaving the practice and moving out of state.  The last day of work is March 17.  Arrangements will be made by [place] for continued follow-up with my colleagues if the need arises.  Questions were answered.    Time spend >66 minutes total time for the patient.       ECOG Performance    0 - Independent    Encounter Diagnoses:    Problem List Items Addressed This Visit        Immune    Monoclonal gammopathy    Relevant Orders    Beta 2 microglobulin    Immunoglobulins A G and M    Lactate Dehydrogenase    Protein electrophoresis    Protein Immunofixation Serum    Kappa and lambda light chain    XR Bone Survey Complete    Protein electrophoresis, timed urine    Immunoglobulin Total Light Chains, Urine    Protein immunofixation urine              ______________________________________________________________________________      History    Mr. Vladimir Morse is a 66 year old gentleman who is here for the consultation with his wife Terri.    He says that he went for the Medicare wellness visit on 2/9/2022 and in the urine test was found to have a monoclonal protein.  He was referred to hematology and oncology.  He is not quite clear why the test was done.    He was in the ER with chest pain on 2/16/2022.  Apparently he had chest pressure after he went up a flight of stairs and was taking a shower.  He has had a stress test and an echo done.  A small area of ischemia has been found.  He is going to go for a CT angiogram.  He has already seen cardiology.    He is feeling well now.  He says that he is generally an active person but has not been able to be as active as usual because of the winter weather.    He has intentionally lost about 25 pounds of weight over the last for 5 years.    The  only lumbar bump that he has noticed is a lipoma that he has had for a long time on his back.    Review of systems.  No fever or night sweats.  No unusual headaches or eyesight issues.  No dizziness.  No bleeding from the nose.  No sores in the mouth. No problems with swallowing.  No shortness of breath. No cough.  No abdominal pain. No nausea or vomiting.  No diarrhea or constipation.  No blood in stool or black colored stools.  No problems passing urine.  No numbness or tingling in hands or feet.  No skin rashes.  A 14 point review of systems is otherwise negative.        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     BPPV (benign paroxysmal positional vertigo), unspecified laterality 07/03/2015     Gilbert's syndrome 12/13/2016     Hyperlipidemia LDL goal <100 11/28/2014     Hypertension 02/22/2018     Lipoma of skin and subcutaneous tissue 06/22/2012     Nephrolithiasis 06/22/2012     Primary osteoarthritis of right knee 12/22/2017     Recurrent intestinal obstruction (H) 07/27/2012    Resolved with conservative management, again on 6/27/2014 & 5/24/2021.     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     Past Surgical History:   Procedure Laterality Date     ARTHROSCOPY KNEE Right 10/26/2015    Procedure: ARTHROSCOPY KNEE;  Surgeon: Rodrigue Cole MD;  Location:  OR     AS TOTAL KNEE ARTHROPLASTY Right 03/05/2018     COLONOSCOPY  11/23/2015    Dr. Freedman [facility]     COLONOSCOPY N/A 07/13/2021    Procedure: COLONOSCOPY;  Surgeon: Luzmaria Ny MD;  Location:  GI     CYSTOSCOPY,  RETROGRADES, INSERT STENT URETER(S), COMBINED  06/29/2012    Procedure: COMBINED CYSTOSCOPY, RETROGRADES, INSERT STENT URETER(S);  LEFT URETEROSCOPY, LEFT URETERAL STENT PLACEMENT;  Surgeon: Timothy Ortega MD;  Location: [facility] OR     EXCISE LESION BACK Left 09/21/2018    Procedure: EXCISE LESION BACK;  Excision subcutaneous mass and skin lesion left back;  Surgeon: Prosper Loving MD;  Location: [facility] OR. Path: lipoma.     EXTRACORPOREAL SHOCK WAVE LITHOTRIPSY (ESWL)  12/20/2012    Procedure: EXTRACORPOREAL SHOCK WAVE LITHOTRIPSY (ESWL);  LEFT EXTRACORPOREAL SHOCKWAVE LITHOTRIPSY ;  Surgeon: Timothy Ortega MD;  Location: [facility] OR     HC KNEE ARTHROSCOPY, MED+LAT MENISCUS REPAIR Left 01/25/2010    Left knee diagnostic arthroscopy and removal of loose bodies and medial and lateral meniscal debridement.     PERONEAL NERVE DECOMPRESSION Left 02/11/2010    and Excision proximal tibiofibular joint ganglion cyst.     TONSILLECTOMY & ADENOIDECTOMY  1964    T&A as a child     Z NONSPECIFIC PROCEDURE  1955    Perforated bowel; infant. Ilio-colonic anastomosis?     Family History   Problem Relation Age of Onset     Breast Cancer Mother 55     Colon Cancer Father      Myocardial Infarction Father      Skin Cancer Father      Breast Cancer Sister 65     Cerebrovascular Disease Brother      Other - See Comments Brother         quadriplegia from an accident     Lung Cancer Brother 66        was a smokder.     Arthritis Brother      No Known Problems Brother      Nephrolithiasis Son      No Known Problems Son      Other - See Comments Son         hip impingement syndrome.     No Known Problems Daughter      Social History     Socioeconomic History     Marital status:      Spouse name: None     Number of children: None     Years of education: None     Highest education level: None   Occupational History     Occupation: Retired. Was a   for [employer].   Tobacco Use     Smoking status: Never Smoker  "    Smokeless tobacco: Never Used   Substance and Sexual Activity     Alcohol use: Yes     Comment: rarely     Drug use: No     Sexual activity: Yes     Partners: Female   Other Topics Concern     Parent/sibling w/ CABG, MI or angioplasty before 65F 55M? Not Asked   Social History Narrative     None     Social Determinants of Health     Financial Resource Strain: Low Risk      Difficulty of Paying Living Expenses: Not hard at all   Food Insecurity: No Food Insecurity     Worried About Running Out of Food in the Last Year: Never true     Ran Out of Food in the Last Year: Never true   Transportation Needs: No Transportation Needs     Lack of Transportation (Medical): No     Lack of Transportation (Non-Medical): No   Physical Activity: Sufficiently Active     Days of Exercise per Week: 7 days     Minutes of Exercise per Session: 30 min   Stress: Stress Concern Present     Feeling of Stress : To some extent   Social Connections: Unknown     Frequency of Communication with Friends and Family: Once a week     Frequency of Social Gatherings with Friends and Family: Not on file     Attends Yazdanism Services: Patient refused     Active Member of Clubs or Organizations: No     Attends Club or Organization Meetings: Not on file     Marital Status:    Intimate Partner Violence: Not on file   Housing Stability: Low Risk      Unable to Pay for Housing in the Last Year: No     Number of Places Lived in the Last Year: 0     Unstable Housing in the Last Year: No         Allergies    Allergies   Allergen Reactions     No Known Drug Allergies            Physical Exam    BP (!) 142/87 (BP Location: Left arm, Patient Position: Sitting, Cuff Size: Adult Regular)   Pulse 63   Resp 16   Ht 1.727 m (5' 8\")   Wt 80.3 kg (177 lb)   SpO2 100%   BMI 26.91 kg/m        GENERAL: Alert and oriented to time place and person. Seated comfortably. In no distress.  He looks well overall.  Thin build.      HEAD: Atraumatic and " normocephalic.    EYES: KHURRAM, EOMI.  No pallor.  No icterus.    Oral cavity: He is wearing a mask.    NECK: supple. JVP normal.  No thyroid enlargement.    LYMPH NODES: No palpable, cervical, axillary or inguinal lymphadenopathy.    CHEST: clear to auscultation bilaterally.  Resonant to percussion throughout bilaterally.  Symmetrical breath movements bilaterally.    CVS: S1 and S2 are heard. Regular rate and rhythm.  No murmur or gallop or rub heard.  No peripheral edema.    ABDOMEN: Soft. Not tender. Not distended.  No palpable hepatomegaly or splenomegaly.  No other mass palpable.  Bowel sounds heard.    EXTREMITIES: Warm.    SKIN: no rash, or bruising or purpura.  Male pattern baldness.    Lab Results    Office Visit on 02/09/2022   Component Date Value Ref Range Status     Sodium 02/09/2022 141  133 - 144 mmol/L Final     Potassium 02/09/2022 4.3  3.4 - 5.3 mmol/L Final     Chloride 02/09/2022 109  94 - 109 mmol/L Final     Carbon Dioxide (CO2) 02/09/2022 30  20 - 32 mmol/L Final     Anion Gap 02/09/2022 2 (A) 3 - 14 mmol/L Final     Urea Nitrogen 02/09/2022 17  7 - 30 mg/dL Final     Creatinine 02/09/2022 0.70  0.66 - 1.25 mg/dL Final     Calcium 02/09/2022 9.1  8.5 - 10.1 mg/dL Final     Glucose 02/09/2022 90  70 - 99 mg/dL Final     Alkaline Phosphatase 02/09/2022 103  40 - 150 U/L Final     AST 02/09/2022 21  0 - 45 U/L Final     ALT 02/09/2022 44  0 - 70 U/L Final     Protein Total 02/09/2022 7.8  6.8 - 8.8 g/dL Final     Albumin 02/09/2022 4.1  3.4 - 5.0 g/dL Final     Bilirubin Total 02/09/2022 2.4 (A) 0.2 - 1.3 mg/dL Final     GFR Estimate 02/09/2022 >90  >60 mL/min/1.73m2 Final     Prostate Specific Antigen Screen 02/09/2022 2.23  0.00 - 4.00 ug/L Final     Cholesterol 02/09/2022 138  <200 mg/dL Final     Triglycerides 02/09/2022 77  <150 mg/dL Final     Direct Measure HDL 02/09/2022 60  >=40 mg/dL Final     LDL Cholesterol Calculated 02/09/2022 63  <=100 mg/dL Final     Non HDL Cholesterol 02/09/2022  78  <130 mg/dL Final     Patient Fasting > 8hrs? 02/09/2022 Unknown   Final     TSH 02/09/2022 1.84  0.40 - 4.00 mU/L Final     Color Urine 02/09/2022 Yellow  Colorless, Straw, Light Yellow, Yellow Final     Appearance Urine 02/09/2022 Clear  Clear Final     Glucose Urine 02/09/2022 Negative  Negative mg/dL Final     Bilirubin Urine 02/09/2022 Negative  Negative Final     Ketones Urine 02/09/2022 Negative  Negative mg/dL Final     Specific Gravity Urine 02/09/2022 1.025  1.003 - 1.035 Final     Blood Urine 02/09/2022 Negative  Negative Final     pH Urine 02/09/2022 7.0  5.0 - 7.0 Final     Protein Albumin Urine 02/09/2022 Negative  Negative mg/dL Final     Urobilinogen Urine 02/09/2022 1.0  0.2, 1.0 E.U./dL Final     Nitrite Urine 02/09/2022 Negative  Negative Final     Leukocyte Esterase Urine 02/09/2022 Negative  Negative Final     Immunofixation ELP Urine 02/09/2022 Monoclonal free immunoglobulin light chain of kappa chain type. Pathologic significance requires clinical correlation.  ANGELIQUE Kwan M.D., Ph.D., Pathologist ()   Final     WBC Count 02/09/2022 7.1  4.0 - 11.0 10e3/uL Final     RBC Count 02/09/2022 5.46  4.40 - 5.90 10e6/uL Final     Hemoglobin 02/09/2022 15.7  13.3 - 17.7 g/dL Final     Hematocrit 02/09/2022 49.0  40.0 - 53.0 % Final     MCV 02/09/2022 90  78 - 100 fL Final     MCH 02/09/2022 28.8  26.5 - 33.0 pg Final     MCHC 02/09/2022 32.0  31.5 - 36.5 g/dL Final     RDW 02/09/2022 14.0  10.0 - 15.0 % Final     Platelet Count 02/09/2022 240  150 - 450 10e3/uL Final     % Neutrophils 02/09/2022 67  % Final     % Lymphocytes 02/09/2022 24  % Final     % Monocytes 02/09/2022 6  % Final     % Eosinophils 02/09/2022 1  % Final     % Basophils 02/09/2022 0  % Final     Absolute Neutrophils 02/09/2022 4.8  1.6 - 8.3 10e3/uL Final     Absolute Lymphocytes 02/09/2022 1.7  0.8 - 5.3 10e3/uL Final     Absolute Monocytes 02/09/2022 0.5  0.0 - 1.3 10e3/uL Final     Absolute Eosinophils  2022 0.1  0.0 - 0.7 10e3/uL Final     Absolute Basophils 2022 0.0  0.0 - 0.2 10e3/uL Final     Total Protein Serum for ELP 2022 7.5  6.8 - 8.8 g/dL Final     Albumin 2022 4.6  3.7 - 5.1 g/dL Final     Alpha 1 2022 0.3  0.2 - 0.4 g/dL Final     Alpha 2 2022 0.7  0.5 - 0.9 g/dL Final     Beta Globulin 2022 0.8  0.6 - 1.0 g/dL Final     Gamma Globulin 2022 1.1  0.7 - 1.6 g/dL Final     Monoclonal Peak 2022 0.0  <=0.0 g/dL Final     ELP Interpretation 2022 Essentially normal electrophoretic pattern. No obvious monoclonal proteins seen. Pathologic significance requires clinical correlation. ANGELIQUE Kwan M.D., Ph.D., Pathologist ().   Final           Imaging Results    Echocardiogram Complete    Result Date: 2022  [id number]^JEAN MARIE^RUDY  [facility] [address] [place] [address]  Name: SERA GERMAIN MRN: [id number] : 1955 Study Date: 2022 08:26 AM Age: 66 yrs Gender: Male Patient Location: [facility] Reason For Study: Angina pectoris (H) Ordering Physician: RUDY AJ Referring Physician: RUDY AJ Performed By: Suze Dickinson  BSA: 1.9 m2 Height: 68 in Weight: 178 lb ______________________________________________________________________________ Procedure Complete Echo Adult. Optison (NDC #8035-2718) given intravenously. ______________________________________________________________________________ Interpretation Summary  Biplane LVEF is 45%. There is mild to moderate inferior and inferolateral wall hypokinesis. The right ventricle is normal in size and function. There is trace to mild aortic regurgitation. There is no pericardial effusion.  No prior studies for comparison. ______________________________________________________________________________ Left Ventricle The left ventricle is normal in size. There is normal left ventricular wall  thickness. Biplane LVEF is 45%. Diastolic Doppler findings (E/E' ratio and/or other parameters) suggest left ventricular filling pressures are indeterminate. There is mild inferior and inferolateral wall hypokinesis.  Right Ventricle The right ventricle is normal in size and function.  Atria The left atrium is mildly dilated. Right atrial size is normal.  Mitral Valve There is mild mitral annular calcification. There is trace to mild mitral regurgitation. There is no mitral valve stenosis.  Tricuspid Valve The tricuspid valve is normal in structure and function. There is trace to mild tricuspid regurgitation. Right ventricular systolic pressure could not be approximated due to inadequate tricuspid regurgitation.  Aortic Valve The aortic valve is normal in structure and function. There is trace to mild aortic regurgitation. No hemodynamically significant valvular aortic stenosis.  Pulmonic Valve The pulmonic valve is not well visualized. There is trace pulmonic valvular regurgitation.  Vessels The aortic root is normal size. Normal size ascending aorta.  Pericardium There is no pericardial effusion.  ______________________________________________________________________________ MMode/2D Measurements & Calculations IVSd: 0.80 cm LVIDd: 5.5 cm LVIDs: 4.1 cm LVPWd: 0.69 cm  FS: 25.5 % LV mass(C)d: 148.4 grams LV mass(C)dI: 76.3 grams/m2 Ao root diam: 3.0 cm asc Aorta Diam: 3.0 cm LVOT diam: 2.3 cm LVOT area: 4.0 cm2 LA Volume (BP): 73.0 ml LA Volume Index (BP): 37.4 ml/m2 RWT: 0.25  Doppler Measurements & Calculations MV E max valerie: 62.3 cm/sec MV A max valerie: 43.2 cm/sec MV E/A: 1.4 MV dec time: 0.14 sec PA acc time: 0.13 sec E/E' av.7 Lateral E/e': 6.4 Medial E/e': 13.0  ______________________________________________________________________________ Report approved by: Shama Arauz 2022 09:15 AM       NM MPI Treadmill    Result Date: 2022     The nuclear stress test is abnormal.    There is a small  area of mild ischemia in the inferoapical segment(s) of the left ventricle.    Left ventricular function is low normal, EF 52% at rest, 48% wtih stress.    There is no prior study for comparison.  Recommend echocardiogram for formal assessment of LV function.         Signed by: Celine Fraser MD        Again, thank you for allowing me to participate in the care of your patient.        Sincerely,        Celine Fraser MD

## 2022-02-23 NOTE — PROGRESS NOTES
"Oncology Rooming Note    February 23, 2022 12:51 PM   Vladimir Morse is a 66 year old male who presents for:    Chief Complaint   Patient presents with     Hematology     new consult      Initial Vitals: BP (!) 142/87 (BP Location: Left arm, Patient Position: Sitting, Cuff Size: Adult Regular)   Pulse 63   Resp 16   Ht 1.727 m (5' 8\")   Wt 80.3 kg (177 lb)   SpO2 100%   BMI 26.91 kg/m   Estimated body mass index is 26.91 kg/m  as calculated from the following:    Height as of this encounter: 1.727 m (5' 8\").    Weight as of this encounter: 80.3 kg (177 lb). Body surface area is 1.96 meters squared.  No Pain (0) Comment: Data Unavailable   No LMP for male patient.  Allergies reviewed: Yes  Medications reviewed: Yes    Medications: Medication refills not needed today.  Pharmacy name entered into [employer]: [facility] - [place], [place] - [address] [place] [address]    Clinical concerns: new consult       Marielle Ellington            "

## 2022-02-24 LAB
B2 MICROGLOB TUMOR MARKER SER-MCNC: 1.5 MG/L
KAPPA LC FREE SER-MCNC: 1.17 MG/DL (ref 0.33–1.94)
KAPPA LC FREE/LAMBDA FREE SER NEPH: 1.22 {RATIO} (ref 0.26–1.65)
LAMBDA LC FREE SERPL-MCNC: 0.96 MG/DL (ref 0.57–2.63)

## 2022-02-24 NOTE — TELEPHONE ENCOUNTER
Covering provider sent Mychart response, will monitor and close if okay  Bess Rodriguez RN, BSN  [facility]

## 2022-02-24 NOTE — TELEPHONE ENCOUNTER
See [employer] message, routed to covering provider pool, see question, related diagnosis was weight loss for test  Bess Rodriguez, RN, BSN  [facility]

## 2022-02-24 NOTE — TELEPHONE ENCOUNTER
Encounter closed, pt agrees to f/u prn when BW back, see Mychart response  Bess Rodriguez, RN, BSN  [facility]

## 2022-02-25 LAB
ALBUMIN PERCENT: 65 % (ref 51–67)
ALBUMIN SERPL ELPH-MCNC: 4.8 G/DL (ref 3.2–4.7)
ALPHA 1 PERCENT: 2.6 % (ref 2–4)
ALPHA 2 PERCENT: 9.6 % (ref 5–13)
ALPHA1 GLOB SERPL ELPH-MCNC: 0.2 G/DL (ref 0.1–0.3)
ALPHA2 GLOB SERPL ELPH-MCNC: 0.7 G/DL (ref 0.4–0.9)
B-GLOBULIN SERPL ELPH-MCNC: 0.8 G/DL (ref 0.7–1.2)
BETA PERCENT: 10.2 % (ref 10–17)
GAMMA GLOB SERPL ELPH-MCNC: 0.9 G/DL (ref 0.6–1.4)
GAMMA GLOBULIN PERCENT: 12.6 % (ref 9–20)
PATH ICD:: ABNORMAL
PATH ICD:: NORMAL
PROT PATTERN SERPL ELPH-IMP: ABNORMAL
PROT PATTERN SERPL IFE-IMP: NORMAL
REVIEWING PATHOLOGIST: ABNORMAL
REVIEWING PATHOLOGIST: NORMAL
TOTAL PROTEIN SERUM FOR ELP (SYNCED VALUE): 7.4 G/DL

## 2022-02-25 PROCEDURE — 84166 PROTEIN E-PHORESIS/URINE/CSF: CPT | Performed by: INTERNAL MEDICINE

## 2022-02-25 PROCEDURE — 86335 IMMUNFIX E-PHORSIS/URINE/CSF: CPT | Performed by: INTERNAL MEDICINE

## 2022-02-25 PROCEDURE — 83883 ASSAY NEPHELOMETRY NOT SPEC: CPT | Performed by: INTERNAL MEDICINE

## 2022-02-25 PROCEDURE — 86335 IMMUNFIX E-PHORSIS/URINE/CSF: CPT | Mod: 26 | Performed by: PATHOLOGY

## 2022-02-25 PROCEDURE — 84166 PROTEIN E-PHORESIS/URINE/CSF: CPT | Mod: 26 | Performed by: PATHOLOGY

## 2022-02-28 LAB
KAPPA LC UR-MCNC: <0.9 MG/DL
KAPPA LC/LAMBDA UR: NORMAL {RATIO} (ref 0.7–6.2)
LAMBDA LC UR-MCNC: <0.7 MG/DL

## 2022-03-02 LAB
PATH ICD:: NORMAL
PROT PATTERN UR ELPH-IMP: NORMAL
PROTEIN TOTAL TIMED URINE MG/SPEC LHE: NORMAL
REVIEWING PATHOLOGIST: NORMAL
SPECIMEN VOL UR: 1600 ML

## 2022-03-04 LAB
PATH ICD:: NORMAL
PROT ELPH PNL UR ELPH: NORMAL
REVIEWING PATHOLOGIST: NORMAL

## 2022-03-08 ENCOUNTER — HOSPITAL ENCOUNTER (OUTPATIENT)
Dept: CARDIOLOGY | Facility: CLINIC | Age: 67
Discharge: HOME | End: 2022-03-08
Attending: INTERNAL MEDICINE | Admitting: INTERNAL MEDICINE

## 2022-03-08 ENCOUNTER — HOSPITAL ENCOUNTER (OUTPATIENT)
Dept: CARDIOLOGY | Facility: CLINIC | Age: 67
Discharge: HOME | End: 2022-03-08
Attending: INTERNAL MEDICINE | Admitting: INTERNAL MEDICINE
Payer: COMMERCIAL

## 2022-03-08 VITALS — SYSTOLIC BLOOD PRESSURE: 134 MMHG | DIASTOLIC BLOOD PRESSURE: 85 MMHG | HEART RATE: 68 BPM

## 2022-03-08 DIAGNOSIS — I42.8 OTHER CARDIOMYOPATHY (H): ICD-10-CM

## 2022-03-08 DIAGNOSIS — I99.8 ISCHEMIA: ICD-10-CM

## 2022-03-08 DIAGNOSIS — R07.89 ATYPICAL CHEST PAIN: ICD-10-CM

## 2022-03-08 PROCEDURE — 75571 CT HRT W/O DYE W/CA TEST: CPT | Mod: 26 | Performed by: INTERNAL MEDICINE

## 2022-03-08 PROCEDURE — 250N000013 HC RX MED GY IP 250 OP 250 PS 637: Performed by: INTERNAL MEDICINE

## 2022-03-08 PROCEDURE — 75571 CT HRT W/O DYE W/CA TEST: CPT

## 2022-03-08 RX ORDER — DIPHENHYDRAMINE HCL 25 MG
25 CAPSULE ORAL
Status: DISCONTINUED | OUTPATIENT
Start: 2022-03-08 | End: 2022-03-09 | Stop reason: HOSPADM

## 2022-03-08 RX ORDER — METOPROLOL TARTRATE 1 MG/ML
5-15 INJECTION, SOLUTION INTRAVENOUS
Status: DISCONTINUED | OUTPATIENT
Start: 2022-03-08 | End: 2022-03-09 | Stop reason: HOSPADM

## 2022-03-08 RX ORDER — METOPROLOL TARTRATE 25 MG/1
25-100 TABLET, FILM COATED ORAL
Status: COMPLETED | OUTPATIENT
Start: 2022-03-08 | End: 2022-03-08

## 2022-03-08 RX ORDER — NITROGLYCERIN 0.4 MG/1
0.4 TABLET SUBLINGUAL
Status: DISCONTINUED | OUTPATIENT
Start: 2022-03-08 | End: 2022-03-09 | Stop reason: HOSPADM

## 2022-03-08 RX ORDER — DILTIAZEM HYDROCHLORIDE 5 MG/ML
10-15 INJECTION INTRAVENOUS
Status: DISCONTINUED | OUTPATIENT
Start: 2022-03-08 | End: 2022-03-09 | Stop reason: HOSPADM

## 2022-03-08 RX ORDER — IVABRADINE 5 MG/1
5-15 TABLET, FILM COATED ORAL
Status: COMPLETED | OUTPATIENT
Start: 2022-03-08 | End: 2022-03-08

## 2022-03-08 RX ORDER — METHYLPREDNISOLONE SODIUM SUCCINATE 125 MG/2ML
125 INJECTION, POWDER, LYOPHILIZED, FOR SOLUTION INTRAMUSCULAR; INTRAVENOUS
Status: DISCONTINUED | OUTPATIENT
Start: 2022-03-08 | End: 2022-03-09 | Stop reason: HOSPADM

## 2022-03-08 RX ORDER — DIPHENHYDRAMINE HYDROCHLORIDE 50 MG/ML
25-50 INJECTION INTRAMUSCULAR; INTRAVENOUS
Status: DISCONTINUED | OUTPATIENT
Start: 2022-03-08 | End: 2022-03-09 | Stop reason: HOSPADM

## 2022-03-08 RX ORDER — DILTIAZEM HCL 60 MG
120 TABLET ORAL
Status: DISCONTINUED | OUTPATIENT
Start: 2022-03-08 | End: 2022-03-09 | Stop reason: HOSPADM

## 2022-03-08 RX ORDER — ACYCLOVIR 200 MG/1
0-1 CAPSULE ORAL
Status: DISCONTINUED | OUTPATIENT
Start: 2022-03-08 | End: 2022-03-09 | Stop reason: HOSPADM

## 2022-03-08 RX ORDER — ONDANSETRON 2 MG/ML
4 INJECTION INTRAMUSCULAR; INTRAVENOUS
Status: DISCONTINUED | OUTPATIENT
Start: 2022-03-08 | End: 2022-03-09 | Stop reason: HOSPADM

## 2022-03-08 RX ADMIN — IVABRADINE 15 MG: 5 TABLET, FILM COATED ORAL at 13:18

## 2022-03-08 RX ADMIN — METOPROLOL TARTRATE 100 MG: 50 TABLET, FILM COATED ORAL at 13:17

## 2022-03-09 ENCOUNTER — VIRTUAL VISIT (OUTPATIENT)
Dept: ONCOLOGY | Facility: CLINIC | Age: 67
End: 2022-03-09
Attending: INTERNAL MEDICINE
Payer: COMMERCIAL

## 2022-03-09 VITALS — WEIGHT: 178 LBS | BODY MASS INDEX: 27.06 KG/M2

## 2022-03-09 DIAGNOSIS — D47.2 MONOCLONAL GAMMOPATHY: Primary | ICD-10-CM

## 2022-03-09 PROCEDURE — 99213 OFFICE O/P EST LOW 20 MIN: CPT | Mod: 95 | Performed by: INTERNAL MEDICINE

## 2022-03-09 PROCEDURE — G0463 HOSPITAL OUTPT CLINIC VISIT: HCPCS | Mod: PN,RTG | Performed by: INTERNAL MEDICINE

## 2022-03-09 ASSESSMENT — PAIN SCALES - GENERAL: PAINLEVEL: NO PAIN (0)

## 2022-03-09 NOTE — PROGRESS NOTES
Vladimir is a 66 year old who is being evaluated via a billable video visit.      How would you like to obtain your AVS? MyChart  If the video visit is dropped, the invitation should be resent by: Text to cell phone: [phone]  Will anyone else be joining your video visit?     Video Start Time:   Video-Visit Details    Type of service:  Video Visit    Video start time: 2:21 PM    Video End Time: Video end time 2:30 PM.    Originating Location (pt. Location): Home    Distant Location (provider location):  [facility] [place]     Platform used for Video Visit: [facility] Hematology and Oncology Progress Note    Patient: Vladimir Morse  MRN: [id number]  Date of Service: Mar 9, 2022         Reason for Visit    Follow-up regarding monoclonal gammopathy, free monoclonal kappa light chains found in urine monofixation.    Assessment and Plan    ECOG Performance    0 - Independent     Pain  Pain Score: No Pain (0)      Mr.Joseph BLANCO Morse is a 66 year old gentleman who was referred to hematology oncology after he had a urine immunofixation which showed some free monoclonal kappa light chains in the test done on 2/9/2022.  This is done as part of the Medicare wellness visit.  The patient says that the point of concern was the weight loss that he had over the last few years.  He says that he lost 25 pounds of weight over 5 years, intentionally.    1.  We discussed with the work-up that was done for the monoclonal gammopathy of unknown significance.  The x-ray bone skeletal survey from 2/23/2022 showed degenerative arthritis in multiple sites but nothing that looks suspicious for myeloma like lytic bone lesions.  The blood work, the beta-2 microglobulin level and the LDH was normal.  SPEP and serum immunofixation did not show a monoclonal spike.  Quantitative immunoglobulin levels were normal.  Serum free light chain levels were normal and he Kappa to lambda ratio was also normal.  In the  24-hour urine collection, again the electrophoresis and immunofixation did not show any monoclonal protein or even significant proteinuria.  The urine light chains were also not detectable.      2.  I discussed with the patient and his wife that I suspect that the small amount of light chains found in the random urine immunofixation done on 2/9/2022 was likely reactive or incidental or a lab error.  In the detailed work-up that we have done so far, we have not found even a hint of a monoclonal gammopathy.  I do not think he has myeloma.  We are not finding anything like lymphoma.  At this point I do not think he needs ongoing hematology and oncology follow-up.    3.  I also discussed with him that the CT scan of the chest that he had done for CAD on 3/8/2022 did show some tiny lung nodules.  These appeared to be low risk given that he is a non-smoker.  I reminded him to discuss with the ordering doctor and his primary physician about follow-up for the lung nodules.  He voiced understanding.    4.  He is aware that I am leaving the practice soon.  I discussed with him that down the line if he needs any help from the heme-onc point of view, he should call the [facility] cancer clinic.    Time spend >20 minutes total time for the patient.         Encounter Diagnoses:    Problem List Items Addressed This Visit        Immune    Monoclonal gammopathy - Primary             CC: Reyes Hinkle MD   ______________________________________________________________________________    History of Present Illness    Mr. Vladimir Morse was evaluated in a video visit.  His wife was also with him.    He says that he feels well.  He has no particular complaints.  They mention that he has had a CT scan of the chest for coronary artery disease.  Apparently the calcium score was high and they are planning to meet with the cardiologist soon.  They have already seen the results in my chart.  He says that he has reduced the heavy  activities like clearing snow a little bit. Otherwise he remains physically active.  He has not lost any more weight.  No lumps or bumps anywhere.  No aches or pains now.      A 12 point review of systems is otherwise negative.    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     BPPV (benign paroxysmal positional vertigo), unspecified laterality 07/03/2015     Gilbert's syndrome 12/13/2016     Hyperlipidemia LDL goal <100 11/28/2014     Hypertension 02/22/2018     Lipoma of skin and subcutaneous tissue 06/22/2012     Nephrolithiasis 06/22/2012     Primary osteoarthritis of right knee 12/22/2017     Recurrent intestinal obstruction (H) 07/27/2012    Resolved with conservative management, again on 6/27/2014 & 5/24/2021.     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       Past Surgical History:   Procedure Laterality Date     ARTHROSCOPY KNEE Right 10/26/2015    Procedure: ARTHROSCOPY KNEE;  Surgeon: Rodrigue Cole MD;  Location:  OR     AS TOTAL KNEE ARTHROPLASTY Right 03/05/2018     COLONOSCOPY  11/23/2015    Dr. Freedman [facility]     COLONOSCOPY N/A 07/13/2021    Procedure: COLONOSCOPY;  Surgeon: Luzmaria Ny MD;  Location:  GI     CYSTOSCOPY, RETROGRADES, INSERT STENT URETER(S), COMBINED  06/29/2012    Procedure: COMBINED CYSTOSCOPY, RETROGRADES, INSERT STENT URETER(S);  LEFT URETEROSCOPY, LEFT URETERAL STENT PLACEMENT;  Surgeon: Timothy Ortega MD;  Location:  OR     EXCISE LESION BACK Left 09/21/2018    Procedure: EXCISE LESION BACK;  Excision subcutaneous mass and skin lesion left  back;  Surgeon: Prosper Loving MD;  Location:  OR. Path: lipoma.     EXTRACORPOREAL SHOCK WAVE LITHOTRIPSY (ESWL)  12/20/2012    Procedure: EXTRACORPOREAL SHOCK WAVE LITHOTRIPSY (ESWL);  LEFT EXTRACORPOREAL SHOCKWAVE LITHOTRIPSY ;  Surgeon: Timothy Ortega MD;  Location: [facility] OR     HC KNEE ARTHROSCOPY, MED+LAT MENISCUS REPAIR Left 01/25/2010    Left knee diagnostic arthroscopy and removal of loose bodies and medial and lateral meniscal debridement.     PERONEAL NERVE DECOMPRESSION Left 02/11/2010    and Excision proximal tibiofibular joint ganglion cyst.     TONSILLECTOMY & ADENOIDECTOMY  1964    T&A as a child     Z NONSPECIFIC PROCEDURE  1955    Perforated bowel; infant. Ilio-colonic anastomosis?         Physical Exam    Wt 80.7 kg (178 lb)   BMI 27.06 kg/m        GENERAL: Alert and oriented to time place and person. Seated comfortably. In no distress.  He looks well overall.  Normal build.  Breathing comfortably and speaking in full sentences.    HEAD: Atraumatic and normocephalic.    EYES: KHURRAM, EOMI.  No pallor.  No icterus.    Oral cavity: No oral lesion evident.    NECK:  No thyroid enlargement.    LYMPH NODES: No evident lymphadenopathy.    CHEST: Symmetrical breath movements bilaterally.      SKIN: no rash, or bruising or purpura.  Male pattern baldness.    Lab Results    Lab on 02/23/2022   Component Date Value Ref Range Status     Volume in mL 02/25/2022 1,600  mL Final     ELP Interpretation Urine 02/25/2022 No abnormal protein component identified.    Final     Path ICD: 02/25/2022 D47.2   Final     Reviewing Pathologist 02/25/2022 Vladimir Ortiz MD    Final     Total Protein Timed Urine mg/spec 02/25/2022    Final     Kappa Total Light Chain, U 02/25/2022 <0.9000  <0.9000 mg/dL Final     Lambda Total Light Chain, U 02/25/2022 <0.7000  <0.7000 mg/dL Final     Kappa/Lambda TLC Ratio, U 02/25/2022 SEE NOTE  0.7000 - 6.20 Final     Immunofixation ELP Urine 02/25/2022 No monoclonal component  identified.    Final     Path ICD: 02/25/2022 D47.2   Final     Reviewing Pathologist 02/25/2022 Vladimir Ortiz MD    Final   Oncology Visit on 02/23/2022   Component Date Value Ref Range Status     Beta-2-Microglobulin 02/23/2022 1.5  <2.3 mg/L Final     Immunoglobulin G 02/23/2022 1,231  700-1,700 mg/dL Final     Immunoglobulin A 02/23/2022 127  65 - 400 mg/dL Final     Immunoglobulin M 02/23/2022 65  60 - 280 mg/dL Final     Lactate Dehydrogenase 02/23/2022 172  125 - 220 U/L Final     Immunofixation ELP 02/23/2022 Unremarkable immunofixation electrophoresis.    Final     Path ICD: 02/23/2022 D47.2   Final     Reviewing Pathologist 02/23/2022 Janette Dozier MD     Final     Kappa Free Light Chains 02/23/2022 1.17  0.33 - 1.94 mg/dL Final     Lambda Free Light Chains 02/23/2022 0.96  0.57 - 2.63 mg/dL Final     Kappa /Lambda Ratio 02/23/2022 1.22  0.26 - 1.65 Final     Total Protein Serum for ELP 02/23/2022 7.4  6.0 - 8.0 g/dL Final     Albumin % 02/23/2022 65.0  51.0 - 67.0 % Final     Albumin 02/23/2022 4.8 (A) 3.2 - 4.7 g/dL Final     Alpha 1 % 02/23/2022 2.6  2.0 - 4.0 % Final     Alpha 1 02/23/2022 0.2  0.1 - 0.3 g/dL Final     Alpha 2 % 02/23/2022 9.6  5.0 - 13.0 % Final     Alpha 2 02/23/2022 0.7  0.4 - 0.9 g/dL Final     Beta % 02/23/2022 10.2  10.0 - 17.0 % Final     Beta Globulin 02/23/2022 0.8  0.7 - 1.2 g/dL Final     Gamma Globulin % 02/23/2022 12.6  9.0 - 20.0 % Final     Gamma Globulin 02/23/2022 0.9  0.6 - 1.4 g/dL Final     ELP Interpretation 02/23/2022    Final                    Value:Increased albumin, which can be seen in dehydration among other disorders. Albumin levels may be elevated in dehydration, congestive heart failure, poor protein utilization, glucocorticoid excess, and congenital causes among possibilities. Clinical correlation is required.      Path ICD: 02/23/2022 D47.2   Final     Reviewing Pathologist 02/23/2022 Janette Dozier MD     Final     Total Protein  Serum for ELP 02/23/2022 7.4  g/dL Final                 Imaging    CT Coronary Calcium Scan    Result Date: 3/8/2022  Procedure: CT CALCIUM SCREENING Examination Date: 3/8/2022 2:44 PM  Clinical Information: Ischemia; Other cardiomyopathy (H); Atypical chest pain Ordering Provider: Rodrigue Pires MD Unable to perform CT coronary angiography as the majority of the calcium is located on the left main and the LAD. PROCEDURE: High-resolution, ECG synchronized multi-slice computed tomography was performed without incident. Coronary calcification was analyzed using Nephera calcium scoring software. Scan protocol was optimized to minimize radiation exposure. The total radiation exposure was calculated to be 22 DLP and 0.41 mSv. FINDINGS: Overall quality of the study: Adequate. CORONARY ARTERY CALCIUM SCORES: Left main coronary artery: 226 Left anterior descending coronary artery: 363 Circumflex coronary artery: 4.62 Right coronary artery: 125 TOTAL CALCIUM SCORE: 719 The total Agatston calcium score is 719 placing the patient in the 86th percentile when compared to age and gender matched control group. Please review Radiology report for incidental noncardiac findings that will follow separately CALCIUM SCORE OF >400: If the patient has more than one cardiac risk factor then the risk factor ((male age >45, female age >55, diabetes, hypertension, smoking, high cholesterol, low HDL, family history of heart disease) of coronary heart disease, death or myocardial infarction is 2.4% per year (XOCHITL Aly. et al. [facility], 2007; 49:378-402.) This represents a high and significant coronary atherosclerotic plaque burden and is consistent with a high risk of the presence of clinically significant coronary artery disease. Because of the risk for the presence of obstructive coronary disease, the patient should consider further imaging studies or cardiac stress test at the discretion of the physician. Alternatively, referral could be  made to a cardiologist for further evaluation. Consider starting aspirin in doses between 81 and 325 mg if there is no contraindication. If there are any cardiac symptoms (for example such as chest pain/pressure or shortness of breath) then immediate medical attention is necessary. GENERAL RECOMMENDATIONS: Adoption and maintenance of a healthy lifestyle is recommended for all people. This should include regular, appropriate exercise and observance of a proper diet, to ensure balanced nutrition and weight control. Tobacco use should be avoided. Cholesterol has been linked to coronary atherosclerosis, and we strongly encourage adhering to the recommendations of the National Cholesterol Education Panel (NCEP) in this regard. For primary prevention, these include a target goal for total cholesterol of less than 200 mg/dl, HDL cholesterol of greater than 40 mg/dl, triglycerides of less than 150 mg/dl, and LDL cholesterol of less than 70 mg/dl. However note that these are general recommendations only, and as with all such matters, the personal physician should be consulted regarding recommendations appropriate for the individual. SAPNA BARNARD MD   SYSTEM ID:  [id number]    Echocardiogram Complete    Result Date: 2022  [id number]^JEAN MARIE^RUDY  [facility] [address] [place] [address]  Name: SERA GERMAIN MRN: [id number] : 1955 Study Date: 2022 08:26 AM Age: 66 yrs Gender: Male Patient Location: [facility] Reason For Study: Angina pectoris (H) Ordering Physician: URDY AJ Referring Physician: RUDY AJ Performed By: Suze Dickinson  BSA: 1.9 m2 Height: 68 in Weight: 178 lb ______________________________________________________________________________ Procedure Complete Echo Adult. Optison (NDC #7734-9396) given intravenously. ______________________________________________________________________________ Interpretation  Summary  Biplane LVEF is 45%. There is mild to moderate inferior and inferolateral wall hypokinesis. The right ventricle is normal in size and function. There is trace to mild aortic regurgitation. There is no pericardial effusion.  No prior studies for comparison. ______________________________________________________________________________ Left Ventricle The left ventricle is normal in size. There is normal left ventricular wall thickness. Biplane LVEF is 45%. Diastolic Doppler findings (E/E' ratio and/or other parameters) suggest left ventricular filling pressures are indeterminate. There is mild inferior and inferolateral wall hypokinesis.  Right Ventricle The right ventricle is normal in size and function.  Atria The left atrium is mildly dilated. Right atrial size is normal.  Mitral Valve There is mild mitral annular calcification. There is trace to mild mitral regurgitation. There is no mitral valve stenosis.  Tricuspid Valve The tricuspid valve is normal in structure and function. There is trace to mild tricuspid regurgitation. Right ventricular systolic pressure could not be approximated due to inadequate tricuspid regurgitation.  Aortic Valve The aortic valve is normal in structure and function. There is trace to mild aortic regurgitation. No hemodynamically significant valvular aortic stenosis.  Pulmonic Valve The pulmonic valve is not well visualized. There is trace pulmonic valvular regurgitation.  Vessels The aortic root is normal size. Normal size ascending aorta.  Pericardium There is no pericardial effusion.  ______________________________________________________________________________ MMode/2D Measurements & Calculations IVSd: 0.80 cm LVIDd: 5.5 cm LVIDs: 4.1 cm LVPWd: 0.69 cm  FS: 25.5 % LV mass(C)d: 148.4 grams LV mass(C)dI: 76.3 grams/m2 Ao root diam: 3.0 cm asc Aorta Diam: 3.0 cm LVOT diam: 2.3 cm LVOT area: 4.0 cm2 LA Volume (BP): 73.0 ml LA Volume Index (BP): 37.4 ml/m2 RWT: 0.25  Doppler  Measurements & Calculations MV E max valerie: 62.3 cm/sec MV A max valerie: 43.2 cm/sec MV E/A: 1.4 MV dec time: 0.14 sec PA acc time: 0.13 sec E/E' av.7 Lateral E/e': 6.4 Medial E/e': 13.0  ______________________________________________________________________________ Report approved by: Shama Arauz 2022 09:15 AM       Radiologist Consult For Cardiology    Result Date: 3/8/2022  RADIOLOGIST CONSULT FOR CARDIOLOGY  3/8/2022 2:44 PM HISTORY: Ischemia. Other cardiomyopathy (H). Atypical chest pain. COMPARISON: None. TECHNIQUE: Volumetric helical acquisition of CT images of the chest from the clavicles to the kidneys were acquired without IV contrast. Radiation dose for this scan was reduced using automated exposure control, adjustment of the mA and/or kV according to patient size, or iterative reconstruction technique.     IMPRESSION: 2 mm left lower lobe nodule, image 12 series 5. A few benign fissural nodules also evident. 2 mm nodule in the posterior costophrenic sulcus on the left, image 61 series 5.  Recommendations for one or multiple incidental lung nodules < 6mm:   Low risk patients: No routine follow-up.   High risk patients: Optional follow-up CT at 12 months; if unchanged, no further follow-up. *Low Risk: Minimal or absent history of smoking or other known risk factors. *Nonsolid (ground glass) or partly solid nodules may require longer follow-up to exclude indolent adenocarcinoma. *Recommendations based on Guidelines for the Management of Incidental Pulmonary Nodules Detected at CT: From the Fleischner Society 2017, Radiology 2017. Please see cardiology report for cardiac and vascular findings. PETER DYE MD   SYSTEM ID:  [id number]    XR Bone Survey Complete    Result Date: 2022  EXAM: XR BONE SURVEY COMPLETE LOCATION: [facility] DATE/TIME: 2022 2:21 PM INDICATION: Monoclonal gammopathy. COMPARISON: None.     IMPRESSION: No lytic lesions. No radiographic  evidence of myeloma. Mild to moderate multilevel cervical spine degenerative change. Lungs clear without consolidation, pleural effusion, pulmonary edema or pneumothorax. Mild cardiomegaly. Tortuous thoracic aorta. Mediastinal contours and pulmonary vascularity normal. Probable bilateral renal stones. Intestinal gas pattern normal. Degenerative change in the thoracolumbar spine. Mild bilateral hip osteoarthritis. Right total knee arthroplasty. Left knee degenerative arthrosis. Arterial calcification.     NM MPI Treadmill    Result Date: 2/18/2022     The nuclear stress test is abnormal.    There is a small area of mild ischemia in the inferoapical segment(s) of the left ventricle.    Left ventricular function is low normal, EF 52% at rest, 48% wtih stress.    There is no prior study for comparison.  Recommend echocardiogram for formal assessment of LV function.         Signed by: Celine Fraser MD

## 2022-03-10 ENCOUNTER — MYC MEDICAL ADVICE (OUTPATIENT)
Dept: CARDIOLOGY | Facility: CLINIC | Age: 67
End: 2022-03-10
Payer: COMMERCIAL

## 2022-03-10 ENCOUNTER — TELEPHONE (OUTPATIENT)
Dept: CARDIOLOGY | Facility: CLINIC | Age: 67
End: 2022-03-10
Payer: COMMERCIAL

## 2022-03-10 DIAGNOSIS — R07.89 ATYPICAL CHEST PAIN: Primary | ICD-10-CM

## 2022-03-10 DIAGNOSIS — R93.1 ELEVATED CORONARY ARTERY CALCIUM SCORE: ICD-10-CM

## 2022-03-10 NOTE — TELEPHONE ENCOUNTER
Patient and wife notified of results of calcium score and need for [facility] or with  for risks and benefits and schedule angiogram.

## 2022-03-10 NOTE — TELEPHONE ENCOUNTER
----- Message from Rodrigue Pires MD sent at 3/10/2022  1:32 PM CST -----  He had a single episode of chest pain and mildly decreased EF without inducible ischemia.  We needed a definitive answer as to whether he has occlusive CAD.  Unfortunately CTA did not give us that answer, due to heavy coronary calcification.  He should have a coronary angiogram arranged.  He will need an office visit to arrange (me or RICH).

## 2022-03-11 DIAGNOSIS — R94.30 ABNORMAL CARDIAC FUNCTION TEST: Primary | ICD-10-CM

## 2022-03-14 ENCOUNTER — TELEPHONE (OUTPATIENT)
Dept: CARDIOLOGY | Facility: CLINIC | Age: 67
End: 2022-03-14
Payer: COMMERCIAL

## 2022-03-14 DIAGNOSIS — E78.00 PURE HYPERCHOLESTEROLEMIA: ICD-10-CM

## 2022-03-14 DIAGNOSIS — I67.1 CEREBRAL ANEURYSM: ICD-10-CM

## 2022-03-14 NOTE — TELEPHONE ENCOUNTER
Called patient and wife. Discussed questions regarding the MRI with contrast that is scheduled for today. Explained to pt the impact of contrast dye on the kidneys. However, pt has no hx of kidney disease.     Pt states MRI was not urgent and can wait until after the LHC on 3/21/22. Pt asking how long he should wait until after the LHC. Advised pt to review this with Cardiologist on 3/21/22. Pt and wife agree with the plan. DAKOTA Harley

## 2022-03-14 NOTE — TELEPHONE ENCOUNTER
[facility] Call Center    Phone Message    May a detailed message be left on voicemail: yes     Reason for Call: Other: Per pt going to do a contrast MRI with contrast today at a different facility at 11:45am - but he is having angio coming up and wants to know if there is any conflict on doing MRI before angio please call before 10am before he starts driving to test site.    Action Taken: Message routed to:  Other: Cardiology    Travel Screening: Not Applicable

## 2022-03-16 RX ORDER — ATORVASTATIN CALCIUM 20 MG/1
TABLET, FILM COATED ORAL
Qty: 90 TABLET | Refills: 0 | Status: SHIPPED | OUTPATIENT
Start: 2022-03-16 | End: 2022-06-15

## 2022-03-17 ENCOUNTER — OFFICE VISIT (OUTPATIENT)
Dept: CARDIOLOGY | Facility: CLINIC | Age: 67
End: 2022-03-17
Payer: COMMERCIAL

## 2022-03-17 ENCOUNTER — LAB (OUTPATIENT)
Dept: LAB | Facility: CLINIC | Age: 67
End: 2022-03-17
Payer: COMMERCIAL

## 2022-03-17 VITALS
HEART RATE: 55 BPM | DIASTOLIC BLOOD PRESSURE: 62 MMHG | BODY MASS INDEX: 26.95 KG/M2 | OXYGEN SATURATION: 98 % | HEIGHT: 68 IN | SYSTOLIC BLOOD PRESSURE: 120 MMHG | WEIGHT: 177.8 LBS

## 2022-03-17 DIAGNOSIS — I10 ESSENTIAL HYPERTENSION, BENIGN: ICD-10-CM

## 2022-03-17 DIAGNOSIS — R94.30 ABNORMAL CARDIAC FUNCTION TEST: ICD-10-CM

## 2022-03-17 DIAGNOSIS — R07.89 ATYPICAL CHEST PAIN: Primary | ICD-10-CM

## 2022-03-17 DIAGNOSIS — R93.1 ELEVATED CORONARY ARTERY CALCIUM SCORE: ICD-10-CM

## 2022-03-17 DIAGNOSIS — E78.00 PURE HYPERCHOLESTEROLEMIA: ICD-10-CM

## 2022-03-17 DIAGNOSIS — I51.9 LV DYSFUNCTION: ICD-10-CM

## 2022-03-17 DIAGNOSIS — R94.39 ABNORMAL STRESS TEST: ICD-10-CM

## 2022-03-17 PROCEDURE — U0003 INFECTIOUS AGENT DETECTION BY NUCLEIC ACID (DNA OR RNA); SEVERE ACUTE RESPIRATORY SYNDROME CORONAVIRUS 2 (SARS-COV-2) (CORONAVIRUS DISEASE [COVID-19]), AMPLIFIED PROBE TECHNIQUE, MAKING USE OF HIGH THROUGHPUT TECHNOLOGIES AS DESCRIBED BY CMS-2020-01-R: HCPCS

## 2022-03-17 PROCEDURE — 99214 OFFICE O/P EST MOD 30 MIN: CPT | Performed by: NURSE PRACTITIONER

## 2022-03-17 PROCEDURE — U0005 INFEC AGEN DETEC AMPLI PROBE: HCPCS

## 2022-03-17 NOTE — PROGRESS NOTES
Cardiology Clinic Progress Note  Vladimir Morse MRN# [id number]   YOB: 1955 Age: 66 year old     Reason For Visit:  H&P for coronary angiogram   Primary Cardiologist:   Dr. Pires           History of Presenting Illness:      Vladimir Morse is a pleasant 66 year old patient who carries a past medical history significant for hypertension hyperlipidemia.    On 2/22/2022, he was evaluated by Dr. Pires for an episode of chest pain.  He subsequently underwent a nuclear stress test that showed a small area of mild ischemia in the inferior wall.  An echocardiogram showed mild to moderate inferior and inferior lateral wall hypokinesis, normal RV size and function, and trace to mild aortic regurgitation.  He subsequently underwent a CT coronary angiogram that showed a coronary calcium score of 719 placing him in the 86th percentile when compared to age and gender matched control group.  He is being recommended for coronary angiogram for definitive coronary evaluation.  He presents to the office today accompanied by his wife for H&P.    He is feeling well on a cardiac standpoint, with no further recurrence of chest pain.he denies shortness of breath, PND, orthopnea, presyncope, syncope, edema, heart racing, or palpitations.  Upon exam, lungs are clear bilaterally, heart rate and rhythm regular, no palpitations, or lower extremity edema.    Blood pressure is controlled at 120/62, managed on ramipril.  Heart rate is 55 on no rate controlling agents.  CBC and BMP were unremarkable  Lipid panel is excellent with a total cholesterol of 138, HDL 60, LDL 63, triglycerides 77, on low-dose atorvastatin.  BMI 27.03                       Assessment and Plan:     Vladimir Morse is a pleasant 66 year old patient who carries a past medical history significant for hypertension hyperlipidemia.  He recently underwent an abnormal stress test followed by a CT coronary angiogram that showed a calcium score of 719.  He is being  recommended for coronary angiogram for further evaluation.  Blood pressure is well controlled on ramipril.  Continue on atorvastatin and aspirin.  I will hold off on starting beta-blocker today as his baseline heart rate is 55.  All risks and benefits for this procedure have been explained to this patient and accepted.  This includes but is not limited to death, heart attack, stroke, blood clots, cardiac or vascular perforation, bleeding including the need for blood transfusion and the risk thereof, allergic reaction to x-ray dye, arrhythmia necessitating cardioversion, contrast dye nephropathy (including risks of temporary or permanent dialysis).  We talked about intracoronary stenting and the risks thereof and bypass surgery.  No history of bleeding problems or current bleeding, and no scheduled surgeries or procedures in the next year. Patient understands and wishes to proceed as scheduled     Contrast allergy: No  Anticoagulation: No  Metformin: No   Oral DM meds: No  Insulin: No  Diuretic: No  Use of phosphodiesterase type 5 inhibitor: No  Aspirin: Yes  Pt informed to be NPO at midnight  Renal issues no  Pt has transportation and 24 hours post procedure monitoring set up.   Pt aware of no driving for 24 hours post procedure.               Thank you for allowing me to participate in this delightful patient's care.        Neva Montenegro, APRN CNP         Review of Systems:     Review of Systems:  Skin:  Negative     Eyes:  Negative    ENT:  Negative    Respiratory:  Negative    Cardiovascular:  Negative    Gastroenterology: Negative    Genitourinary:  Negative    Musculoskeletal:  Negative    Neurologic:  Negative    Psychiatric:  Negative    Heme/Lymph/Imm:  Negative    Endocrine:  Negative                Physical Exam:     GEN:  In general, this is a well nourished male in no acute distress.  HEENT:  Pupils equal, round. Sclerae nonicteric. Clear oropharynx. Mucous membranes moist.  NECK: Supple, no masses  appreciated. Trachea midline.  No JVD   C/V:  Regular rate and rhythm, no murmur, rub or gallop. No S3 or RV heave.   RESP: Respirations are unlabored. No use of accessory muscles. Clear to auscultation bilaterally without wheezing, rales, or rhonchi.  GI: Abdomen soft, nontender, nondistended. No HSM appreciated.   EXTREM: No LE edema. No cyanosis or clubbing.  NEURO: Alert and oriented, cooperative. No obvious focal deficits.   PSYCH: Normal affect.  SKIN: Warm and dry. No rashes or petechiae appreciated.          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     BPPV (benign paroxysmal positional vertigo), unspecified laterality 07/03/2015     Gilbert's syndrome 12/13/2016     Hyperlipidemia LDL goal <100 11/28/2014     Hypertension 02/22/2018     Lipoma of skin and subcutaneous tissue 06/22/2012     Nephrolithiasis 06/22/2012     Primary osteoarthritis of right knee 12/22/2017     Recurrent intestinal obstruction (H) 07/27/2012    Resolved with conservative management, again on 6/27/2014 & 5/24/2021.     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              Past Surgical History:     Past Surgical History:   Procedure Laterality Date     ARTHROSCOPY KNEE Right 10/26/2015    Procedure: ARTHROSCOPY KNEE;  Surgeon: Rodrigue Cole MD;  Location:  OR     AS TOTAL KNEE ARTHROPLASTY Right 03/05/2018     COLONOSCOPY  11/23/2015    Dr. Freedman [facility]     COLONOSCOPY N/A 07/13/2021    Procedure: COLONOSCOPY;  Surgeon: Luzmaria Ny MD;  Location:  GI     CYSTOSCOPY, RETROGRADES,  INSERT STENT URETER(S), COMBINED  06/29/2012    Procedure: COMBINED CYSTOSCOPY, RETROGRADES, INSERT STENT URETER(S);  LEFT URETEROSCOPY, LEFT URETERAL STENT PLACEMENT;  Surgeon: Timothy Ortega MD;  Location: [facility] OR     EXCISE LESION BACK Left 09/21/2018    Procedure: EXCISE LESION BACK;  Excision subcutaneous mass and skin lesion left back;  Surgeon: Prosper Loving MD;  Location: [facility] OR. Path: lipoma.     EXTRACORPOREAL SHOCK WAVE LITHOTRIPSY (ESWL)  12/20/2012    Procedure: EXTRACORPOREAL SHOCK WAVE LITHOTRIPSY (ESWL);  LEFT EXTRACORPOREAL SHOCKWAVE LITHOTRIPSY ;  Surgeon: Timothy Ortega MD;  Location: [facility] OR     HC KNEE ARTHROSCOPY, MED+LAT MENISCUS REPAIR Left 01/25/2010    Left knee diagnostic arthroscopy and removal of loose bodies and medial and lateral meniscal debridement.     PERONEAL NERVE DECOMPRESSION Left 02/11/2010    and Excision proximal tibiofibular joint ganglion cyst.     TONSILLECTOMY & ADENOIDECTOMY  1964    T&A as a child     ZZC NONSPECIFIC PROCEDURE  1955    Perforated bowel; infant. Ilio-colonic anastomosis?              Allergies:   No known drug allergies       Data:   All laboratory data reviewed:    LAST CHOLESTEROL:  Lab Results   Component Value Date    CHOL 138 02/09/2022    CHOL 113 10/05/2020     Lab Results   Component Value Date    HDL 60 02/09/2022    HDL 53 10/05/2020     Lab Results   Component Value Date    LDL 63 02/09/2022    LDL 46 10/05/2020     Lab Results   Component Value Date    TRIG 77 02/09/2022    TRIG 70 10/05/2020     Lab Results   Component Value Date    CHOLHDLRATIO 3.6 10/12/2015       LAST BMP:  Lab Results   Component Value Date     02/09/2022     06/03/2021      Lab Results   Component Value Date    POTASSIUM 4.3 02/09/2022    POTASSIUM 4.2 06/03/2021     Lab Results   Component Value Date    CHLORIDE 109 02/09/2022    CHLORIDE 108 06/03/2021     Lab Results   Component Value Date    FREDDY 9.1 02/09/2022    FREDDY 9.1 06/03/2021     Lab Results    Component Value Date    CO2 30 02/09/2022    CO2 28 06/03/2021     Lab Results   Component Value Date    BUN 17 02/09/2022    BUN 11 06/03/2021     Lab Results   Component Value Date    CR 0.70 02/09/2022    CR 0.72 06/03/2021     Lab Results   Component Value Date    GLC 90 02/09/2022    GLC 93 06/03/2021       LAST CBC:  Lab Results   Component Value Date    WBC 7.1 02/09/2022    WBC 5.2 06/03/2021     Lab Results   Component Value Date    RBC 5.46 02/09/2022    RBC 5.17 06/03/2021     Lab Results   Component Value Date    HGB 15.7 02/09/2022    HGB 15.0 06/03/2021     Lab Results   Component Value Date    HCT 49.0 02/09/2022    HCT 47.2 06/03/2021     Lab Results   Component Value Date    MCV 90 02/09/2022    MCV 91 06/03/2021     Lab Results   Component Value Date    MCH 28.8 02/09/2022    MCH 29.0 06/03/2021     Lab Results   Component Value Date    MCHC 32.0 02/09/2022    MCHC 31.8 06/03/2021     Lab Results   Component Value Date    RDW 14.0 02/09/2022    RDW 13.1 06/03/2021     Lab Results   Component Value Date     02/09/2022     06/03/2021

## 2022-03-17 NOTE — PATIENT INSTRUCTIONS
Thanks for participating in a office visit with the [facility] clinic today.    You have been scheduled for a coronary angiogram for evaluation of your heart arteries. Recent stress test and echocardiogram were abnormal.   Risks and benefits were reviewed with verbal understanding.   Nothing to eat after midnight the evening prior to procedure  Ok to take am medications on the day of procedure with a small sip of water.     ANGIOGRAM  [facility]   [place], [place]   Contact [facility] if needed at [phone], Option#2 or [phone].      1. In preparation for your procedure, we require that you do the following:    a. Nothing to eat or drink after midnight if your procedure is before 12 noon.  (If your procedure is scheduled after 12 noon, you may have a clear liquid  breakfast before 8:00am, then nothing else to eat or drink. See list below.)     b. Take your usual morning medications with a small sip of water on the day of your procedure unless you are on the following medications:    Aspirin:  o If you currently take Aspirin, continue taking your same dose.  o If you do not take Aspirin, take 325mg of Aspirin the day before and the morning of the procedure.    Coumadin or Warfarin:  o Stop Coumadin or Warfarin on:  __________________________    Pradaxa or Xarelto:  o Stop Pradaxa or Xarelto on:  ______________________________    Diabetic:  o If you take Glucophage (metformin) do not take the morning of the procedure or for 2 days after the procedure. Contact your Primary Care MD regarding glucose control for the days you do not take Glucophage.  o If you are on other oral diabetic medications do not take on the morning of the procedure.  o If you take Insulin contact your Primary Care MD for the recommended dosage to take the morning of the procedure.    Diuretic (Water Pill):  o If you take a diuretic (water pill), do not take  "the morning of the procedure.    c. If you have an allergy to dye, please contact the [facility] office 4 days before your procedure at [phone] option 2, and ask for a nurse.    2. You will be unable to drive after your procedure; please arrange to have someone drive you home the day of your procedure. You will also need to make sure that there is a responsible adult with you for 24 hours after your procedure. Your procedure will be cancelled if you do not have transportation home or someone to stay with you for 24 hrs.     3. Your procedure will be done at [facility]. Please park in the  Sky[address] Ramp  on the west side of [facility] on [address]. Take the skyway over [facility] to the hospital. Please check in on the first floor, \"Skyway Welcome Desk\" which is one floor down from the skyway level.     4. If you have any questions about your upcoming procedure, please contact the nurse at [facility] at [phone] or the nurse at [facility] at [phone], option#2.  02/20/2013 S17      .    Please call my nurse at [phone] with any questions or concerns.    Scheduling phone number: [phone]  Reminder: Please bring in all current medications, over the counter supplements and vitamin bottles to your next appointment.        "

## 2022-03-18 ENCOUNTER — TELEPHONE (OUTPATIENT)
Dept: MEDSURG UNIT | Facility: CLINIC | Age: 67
End: 2022-03-18
Payer: COMMERCIAL

## 2022-03-18 ENCOUNTER — TELEPHONE (OUTPATIENT)
Dept: CARDIOLOGY | Facility: CLINIC | Age: 67
End: 2022-03-18
Payer: COMMERCIAL

## 2022-03-18 DIAGNOSIS — R07.89 ATYPICAL CHEST PAIN: ICD-10-CM

## 2022-03-18 DIAGNOSIS — R93.1 ELEVATED CORONARY ARTERY CALCIUM SCORE: Primary | ICD-10-CM

## 2022-03-18 LAB — SARS-COV-2 RNA RESP QL NAA+PROBE: NEGATIVE

## 2022-03-18 RX ORDER — ASPIRIN 81 MG/1
243 TABLET, CHEWABLE ORAL ONCE
Status: CANCELLED | OUTPATIENT
Start: 2022-03-18

## 2022-03-18 RX ORDER — POTASSIUM CHLORIDE 1500 MG/1
20 TABLET, EXTENDED RELEASE ORAL
Status: CANCELLED | OUTPATIENT
Start: 2022-03-18

## 2022-03-18 RX ORDER — LIDOCAINE 40 MG/G
CREAM TOPICAL
Status: CANCELLED | OUTPATIENT
Start: 2022-03-18

## 2022-03-18 RX ORDER — ASPIRIN 325 MG
325 TABLET ORAL ONCE
Status: CANCELLED | OUTPATIENT
Start: 2022-03-18 | End: 2022-03-18

## 2022-03-18 RX ORDER — SODIUM CHLORIDE 9 MG/ML
INJECTION, SOLUTION INTRAVENOUS CONTINUOUS
Status: CANCELLED | OUTPATIENT
Start: 2022-03-18

## 2022-03-18 NOTE — TELEPHONE ENCOUNTER
Spoke to patient to review angiogram  scheduled 3-21-22 to arrive at 6:30am  for a  8:30am  procedure. Instructed patient to not eat or drink after midnight and take AM medications to include 4 81mg ASA. Verified patient does not  have a known contrast allergy. Verified wife will drive patient home and be available 24 hours post angiogram . Answered all patient questions and verbalized understanding. Orders placed in EPIC.

## 2022-03-18 NOTE — TELEPHONE ENCOUNTER
Chart reviewed pre procedure. Orders are in Epic, Covid test in process 3/17, note from [facility] today 3/18 regarding arrival time.

## 2022-03-21 ENCOUNTER — HOSPITAL ENCOUNTER (OUTPATIENT)
Facility: CLINIC | Age: 67
Discharge: HOME | End: 2022-03-21
Admitting: INTERNAL MEDICINE
Payer: COMMERCIAL

## 2022-03-21 VITALS
BODY MASS INDEX: 26.52 KG/M2 | RESPIRATION RATE: 16 BRPM | DIASTOLIC BLOOD PRESSURE: 62 MMHG | TEMPERATURE: 97.5 F | HEIGHT: 68 IN | WEIGHT: 175 LBS | SYSTOLIC BLOOD PRESSURE: 99 MMHG | OXYGEN SATURATION: 99 % | HEART RATE: 55 BPM

## 2022-03-21 DIAGNOSIS — R93.1 ELEVATED CORONARY ARTERY CALCIUM SCORE: ICD-10-CM

## 2022-03-21 DIAGNOSIS — R07.89 ATYPICAL CHEST PAIN: ICD-10-CM

## 2022-03-21 PROBLEM — Z98.890 STATUS POST CORONARY ANGIOGRAM: Status: ACTIVE | Noted: 2022-03-21

## 2022-03-21 LAB
ANION GAP SERPL CALCULATED.3IONS-SCNC: 5 MMOL/L (ref 3–14)
BUN SERPL-MCNC: 15 MG/DL (ref 7–30)
CALCIUM SERPL-MCNC: 8.8 MG/DL (ref 8.5–10.1)
CHLORIDE BLD-SCNC: 107 MMOL/L (ref 94–109)
CO2 SERPL-SCNC: 31 MMOL/L (ref 20–32)
CREAT SERPL-MCNC: 0.77 MG/DL (ref 0.66–1.25)
ERYTHROCYTE [DISTWIDTH] IN BLOOD BY AUTOMATED COUNT: 13 % (ref 10–15)
GFR SERPL CREATININE-BSD FRML MDRD: >90 ML/MIN/1.73M2
GLUCOSE BLD-MCNC: 90 MG/DL (ref 70–99)
HCT VFR BLD AUTO: 44.9 % (ref 40–53)
HGB BLD-MCNC: 14.4 G/DL (ref 13.3–17.7)
INR PPP: 1.06 (ref 0.85–1.15)
MCH RBC QN AUTO: 29.3 PG (ref 26.5–33)
MCHC RBC AUTO-ENTMCNC: 32.1 G/DL (ref 31.5–36.5)
MCV RBC AUTO: 91 FL (ref 78–100)
PLATELET # BLD AUTO: 194 10E3/UL (ref 150–450)
POTASSIUM BLD-SCNC: 4 MMOL/L (ref 3.4–5.3)
RBC # BLD AUTO: 4.92 10E6/UL (ref 4.4–5.9)
SODIUM SERPL-SCNC: 143 MMOL/L (ref 133–144)
WBC # BLD AUTO: 6.1 10E3/UL (ref 4–11)

## 2022-03-21 PROCEDURE — 999N000184 HC STATISTIC TELEMETRY

## 2022-03-21 PROCEDURE — 272N000001 HC OR GENERAL SUPPLY STERILE: Performed by: INTERNAL MEDICINE

## 2022-03-21 PROCEDURE — 36415 COLL VENOUS BLD VENIPUNCTURE: CPT | Performed by: INTERNAL MEDICINE

## 2022-03-21 PROCEDURE — 999N000071 HC STATISTIC HEART CATH LAB OR EP LAB

## 2022-03-21 PROCEDURE — 93454 CORONARY ARTERY ANGIO S&I: CPT | Mod: 26 | Performed by: INTERNAL MEDICINE

## 2022-03-21 PROCEDURE — C1725 CATH, TRANSLUMIN NON-LASER: HCPCS | Performed by: INTERNAL MEDICINE

## 2022-03-21 PROCEDURE — 85610 PROTHROMBIN TIME: CPT | Performed by: INTERNAL MEDICINE

## 2022-03-21 PROCEDURE — 258N000003 HC RX IP 258 OP 636: Performed by: INTERNAL MEDICINE

## 2022-03-21 PROCEDURE — 250N000009 HC RX 250: Performed by: INTERNAL MEDICINE

## 2022-03-21 PROCEDURE — 82310 ASSAY OF CALCIUM: CPT | Performed by: INTERNAL MEDICINE

## 2022-03-21 PROCEDURE — 250N000011 HC RX IP 250 OP 636: Performed by: INTERNAL MEDICINE

## 2022-03-21 PROCEDURE — 99152 MOD SED SAME PHYS/QHP 5/>YRS: CPT | Mod: GC | Performed by: INTERNAL MEDICINE

## 2022-03-21 PROCEDURE — 999N000054 HC STATISTIC EKG NON-CHARGEABLE

## 2022-03-21 PROCEDURE — 93005 ELECTROCARDIOGRAM TRACING: CPT

## 2022-03-21 PROCEDURE — 85027 COMPLETE CBC AUTOMATED: CPT | Performed by: INTERNAL MEDICINE

## 2022-03-21 PROCEDURE — 93454 CORONARY ARTERY ANGIO S&I: CPT | Performed by: INTERNAL MEDICINE

## 2022-03-21 PROCEDURE — C1769 GUIDE WIRE: HCPCS | Performed by: INTERNAL MEDICINE

## 2022-03-21 PROCEDURE — 99152 MOD SED SAME PHYS/QHP 5/>YRS: CPT | Performed by: INTERNAL MEDICINE

## 2022-03-21 RX ORDER — LIDOCAINE 40 MG/G
CREAM TOPICAL
Status: DISCONTINUED | OUTPATIENT
Start: 2022-03-21 | End: 2022-03-21 | Stop reason: HOSPADM

## 2022-03-21 RX ORDER — FLUMAZENIL 0.1 MG/ML
0.2 INJECTION, SOLUTION INTRAVENOUS
Status: DISCONTINUED | OUTPATIENT
Start: 2022-03-21 | End: 2022-03-21 | Stop reason: HOSPADM

## 2022-03-21 RX ORDER — ASPIRIN 325 MG
325 TABLET ORAL ONCE
Status: DISCONTINUED | OUTPATIENT
Start: 2022-03-21 | End: 2022-03-21 | Stop reason: HOSPADM

## 2022-03-21 RX ORDER — NALOXONE HYDROCHLORIDE 0.4 MG/ML
0.2 INJECTION, SOLUTION INTRAMUSCULAR; INTRAVENOUS; SUBCUTANEOUS
Status: DISCONTINUED | OUTPATIENT
Start: 2022-03-21 | End: 2022-03-21 | Stop reason: HOSPADM

## 2022-03-21 RX ORDER — ATROPINE SULFATE 0.1 MG/ML
0.5 INJECTION INTRAVENOUS
Status: DISCONTINUED | OUTPATIENT
Start: 2022-03-21 | End: 2022-03-21 | Stop reason: HOSPADM

## 2022-03-21 RX ORDER — OXYCODONE HYDROCHLORIDE 5 MG/1
5 TABLET ORAL EVERY 4 HOURS PRN
Status: DISCONTINUED | OUTPATIENT
Start: 2022-03-21 | End: 2022-03-21 | Stop reason: HOSPADM

## 2022-03-21 RX ORDER — VERAPAMIL HYDROCHLORIDE 2.5 MG/ML
INJECTION, SOLUTION INTRAVENOUS
Status: DISCONTINUED | OUTPATIENT
Start: 2022-03-21 | End: 2022-03-21 | Stop reason: HOSPADM

## 2022-03-21 RX ORDER — NALOXONE HYDROCHLORIDE 0.4 MG/ML
0.4 INJECTION, SOLUTION INTRAMUSCULAR; INTRAVENOUS; SUBCUTANEOUS
Status: DISCONTINUED | OUTPATIENT
Start: 2022-03-21 | End: 2022-03-21 | Stop reason: HOSPADM

## 2022-03-21 RX ORDER — POTASSIUM CHLORIDE 1500 MG/1
20 TABLET, EXTENDED RELEASE ORAL
Status: DISCONTINUED | OUTPATIENT
Start: 2022-03-21 | End: 2022-03-21 | Stop reason: HOSPADM

## 2022-03-21 RX ORDER — ACETAMINOPHEN 325 MG/1
650 TABLET ORAL EVERY 4 HOURS PRN
Status: DISCONTINUED | OUTPATIENT
Start: 2022-03-21 | End: 2022-03-21 | Stop reason: HOSPADM

## 2022-03-21 RX ORDER — FENTANYL CITRATE 50 UG/ML
INJECTION, SOLUTION INTRAMUSCULAR; INTRAVENOUS
Status: DISCONTINUED | OUTPATIENT
Start: 2022-03-21 | End: 2022-03-21 | Stop reason: HOSPADM

## 2022-03-21 RX ORDER — NITROGLYCERIN 5 MG/ML
VIAL (ML) INTRAVENOUS
Status: DISCONTINUED | OUTPATIENT
Start: 2022-03-21 | End: 2022-03-21 | Stop reason: HOSPADM

## 2022-03-21 RX ORDER — OXYCODONE HYDROCHLORIDE 5 MG/1
10 TABLET ORAL EVERY 4 HOURS PRN
Status: DISCONTINUED | OUTPATIENT
Start: 2022-03-21 | End: 2022-03-21 | Stop reason: HOSPADM

## 2022-03-21 RX ORDER — ASPIRIN 81 MG/1
243 TABLET, CHEWABLE ORAL ONCE
Status: DISCONTINUED | OUTPATIENT
Start: 2022-03-21 | End: 2022-03-21 | Stop reason: HOSPADM

## 2022-03-21 RX ORDER — IOPAMIDOL 755 MG/ML
INJECTION, SOLUTION INTRAVASCULAR
Status: DISCONTINUED | OUTPATIENT
Start: 2022-03-21 | End: 2022-03-21 | Stop reason: HOSPADM

## 2022-03-21 RX ORDER — SODIUM CHLORIDE 9 MG/ML
INJECTION, SOLUTION INTRAVENOUS CONTINUOUS
Status: DISCONTINUED | OUTPATIENT
Start: 2022-03-21 | End: 2022-03-21 | Stop reason: HOSPADM

## 2022-03-21 RX ORDER — HEPARIN SODIUM 1000 [USP'U]/ML
INJECTION, SOLUTION INTRAVENOUS; SUBCUTANEOUS
Status: DISCONTINUED | OUTPATIENT
Start: 2022-03-21 | End: 2022-03-21 | Stop reason: HOSPADM

## 2022-03-21 RX ORDER — FENTANYL CITRATE 50 UG/ML
25 INJECTION, SOLUTION INTRAMUSCULAR; INTRAVENOUS
Status: DISCONTINUED | OUTPATIENT
Start: 2022-03-21 | End: 2022-03-21 | Stop reason: HOSPADM

## 2022-03-21 RX ADMIN — SODIUM CHLORIDE: 9 INJECTION, SOLUTION INTRAVENOUS at 07:17

## 2022-03-21 NOTE — PRE-PROCEDURE
GENERAL PRE-PROCEDURE:   Procedure:  Cor angio with possible PCI  Date/Time:  3/21/2022 8:15 AM    Written consent obtained?: Yes    Risks and benefits: Risks, benefits and alternatives were discussed    Consent given by:  Patient  Patient states understanding of procedure being performed: Yes    Patient's understanding of procedure matches consent: Yes    Procedure consent matches procedure scheduled: Yes    Expected level of sedation:  Moderate  Appropriately NPO:  Yes  ASA Class:  3  Mallampati  :  Grade 2- soft palate, base of uvula, tonsillar pillars, and portion of posterior pharyngeal wall visible  Lungs:  Lungs clear with good breath sounds bilaterally  Heart:  RRR  History & Physical reviewed:  History and physical reviewed and no updates needed  Statement of review:  I have reviewed the lab findings, diagnostic data, medications, and the plan for sedation

## 2022-03-21 NOTE — PROGRESS NOTES
Care Suites Post Procedure Note    Patient Information  Name: Vladimir Morse  Age: 66 year old    Post Procedure  Time patient returned to Care Suites: 0900  Concerns/abnormal assessment: no immediate  If abnormal assessment, provider notified: N/A  Plan/Other: Continue post procedure plan of care  Right radial site CDI, no hematoma.  Pulse is palpable. CMS intact.  Denies any pain or discomfort.  Taking po fluid well.   VS stable except being bradycardic.  Family is informed.    Anticipate discharge later today.  Detailed report given to Alpa Mckeon, bedside RN.    Sarina Matt, RN

## 2022-03-21 NOTE — DISCHARGE INSTRUCTIONS
Cardiac Angiogram Discharge Instructions - Radial    After you go home:      Have an adult stay with you until tomorrow.    Drink extra fluids for 2 days.    You may resume your normal diet.    No smoking       For 24 hours - due to the sedation you received:    Relax and take it easy.    Do NOT make any important or legal decisions.    Do NOT drive or operate machines at home or at work.    Do NOT drink alcohol.    Care of Wrist Puncture Site:      For the first 24 hrs - check the puncture site every 1-2 hours while awake.    It is normal to have soreness at the puncture site and mild tingling in your hand for up to 3 days.    Remove the bandaid after 24 hours. If there is minor oozing, apply another bandaid and remove it after 12 hours.    You may shower tomorrow.  Do NOT take a bath, or use a hot tub or pool for at least 3 days. Do NOT scrub the site. Do not use lotion or powder near the puncture site.           Activity:        For 2 days:     do not use your hand or arm to support your weight (such as rising from a chair)     do not bend your wrist (such as lifting a garage door).    do not lift more than 5 pounds or exercise your arm (such as tennis, golf or bowling).    Do NOT do any heavy activity such as exercise, lifting, or straining.     Bleeding:      If you start bleeding from the site in your wrist, sit down and press firmly on/above the site for 10 minutes.     Once bleeding stops, keep arm still for 2 hours.     Call [facility] as soon as you can.       Call 911 right away if you have heavy bleeding or bleeding that does not stop.      Medicines:      If you are taking an antiplatelet medication such as Plavix, Brilinta or Effient, do not stop taking it until you talk to your cardiologist.          Take your medications, including blood thinners, unless your provider tells you not to.        If you have stopped any medicines, check with your provider about when to restart them.    Follow Up  Appointments:      Follow up with [facility] Nurse Practitioner at [facility] of patient preference in 7-10 days.    Call the clinic if:      You have a large or growing hard lump around the site.    The site is red, swollen, hot or tender.    Blood or fluid is draining from the site.    You have chills or a fever greater than 101 F (38 C).    Your arm feels numb, cool or changes color.    You have hives, a rash or unusual itching.    Any questions or concerns.          [facility] at [place]:    [phone] [facility] (7 days a week)

## 2022-03-21 NOTE — PROGRESS NOTES
Care Suites Admission Nursing Note    Patient Information  Name: Vladimir Morse  Age: 66 year old  Reason for admission: heart cath  Care Suites arrival time: 0630    Visitor Information  Name: wife Terri  Informed of visitor restrictions: Yes  1 visitor allowed per patient   Visitor must screen negative for COVID symptoms   Visitor must wear a mask  Waiting rooms closed to visitors    Patient Admission/Assessment   Pre-procedure assessment complete: Yes  If abnormal assessment/labs, provider notified: N/A  NPO: Yes  Medications held per instructions/orders: N/A  Consent: deferred  If applicable, pregnancy test status: deferred  Patient oriented to room: Yes  Education/questions answered: Yes  Plan/other: heart cath    Discharge Planning  Discharge name/phone number: Terri [phone]  Overnight post sedation caregiver: Terri  Discharge location: home    Alpa Hays RN

## 2022-03-21 NOTE — PROGRESS NOTES
PATIENT/VISITOR WELLNESS SCREENING    Step 1 Patient Screening    1. In the last month, have you been in contact with someone who was confirmed or suspected to have Coronavirus/COVID-19? No    2. Do you have the following symptoms?  Fever/Chills? No   Cough? No   Shortness of breath? No   New loss of taste or smell? No  Sore throat? No  Muscle or body aches? No  Headaches? No  Fatigue? No  Vomiting or diarrhea? No    Step 2 Visitor Screening    1. Name of Visitor (1 visitor per patient): ishmael Murray    2. In the last month, have you been in contact with someone who was confirmed or suspected to have Coronavirus/COVID-19? No    3. Do you have the following symptoms?  Fever/Chills? No   Cough? No   Shortness of breath? No   Skin rash? No   Loss of taste or smell? No  Sore throat? No  Runny or stuffy nose? No  Muscle or body aches? No  Headaches? No  Fatigue? No  Vomiting or diarrhea? No    If the visitor has positive symptoms, notify supervisor/manger  Per policy, the visitor will need to leave the facility     Step 3 Refer to logic grid below for actions    NO SYMPTOM(S)    ACTIONS:  1. Standard rooming process  2. Provider to assess per normal protocol  3. Implement precautions as needed and per guidelines     POSITIVE SYMPTOM(S)  If positive for ANY of the following symptoms: fever, cough, shortness of breath, rash    ACTION:  1. Continue to have the patient wear a mask   2. Room patient as soon as possible  3. Don appropriate PPE when entering room  4. Provider evaluation

## 2022-03-21 NOTE — PROGRESS NOTES
Denies any c/o. AVS reviewed with pt and wife and given to pt. Right radial site remains soft with no signs of bleeding. TR Band off at 1040. Good appetite and taking PO fluids well. Pt plans to get out of be and ambulate around 1100.

## 2022-03-21 NOTE — PROGRESS NOTES
Care Suites Discharge Nursing Note    Patient Information  Name: Vladimir Morse  Age: 66 year old    Discharge Education:  Discharge instructions reviewed: Yes  Additional education/resources provided: n/a  Patient/patient representative verbalizes understanding: Yes  Patient discharging on new medications: No  Medication education completed: N/A    Discharge Plans:   Discharge location: home  Discharge ride contacted: Yes  Approximate discharge time: 1145    Discharge Criteria:  Discharge criteria met and vital signs stable: Yes    Patient Belongs:  Patient belongings returned to patient: Yes    Elena Dao RN

## 2022-03-23 LAB
ATRIAL RATE - MUSE: 58 BPM
DIASTOLIC BLOOD PRESSURE - MUSE: NORMAL MMHG
INTERPRETATION ECG - MUSE: NORMAL
P AXIS - MUSE: 33 DEGREES
PR INTERVAL - MUSE: 176 MS
QRS DURATION - MUSE: 104 MS
QT - MUSE: 432 MS
QTC - MUSE: 424 MS
R AXIS - MUSE: -25 DEGREES
SYSTOLIC BLOOD PRESSURE - MUSE: NORMAL MMHG
T AXIS - MUSE: 60 DEGREES
VENTRICULAR RATE- MUSE: 58 BPM

## 2022-03-30 ENCOUNTER — HOSPITAL ENCOUNTER (OUTPATIENT)
Dept: CARDIOLOGY | Facility: CLINIC | Age: 67
Discharge: HOME | End: 2022-03-30
Attending: NURSE PRACTITIONER | Admitting: NURSE PRACTITIONER
Payer: COMMERCIAL

## 2022-03-30 ENCOUNTER — OFFICE VISIT (OUTPATIENT)
Dept: CARDIOLOGY | Facility: CLINIC | Age: 67
End: 2022-03-30
Payer: COMMERCIAL

## 2022-03-30 VITALS
DIASTOLIC BLOOD PRESSURE: 68 MMHG | BODY MASS INDEX: 27.4 KG/M2 | SYSTOLIC BLOOD PRESSURE: 116 MMHG | HEART RATE: 56 BPM | HEIGHT: 68 IN | WEIGHT: 180.8 LBS

## 2022-03-30 DIAGNOSIS — E78.00 PURE HYPERCHOLESTEROLEMIA: ICD-10-CM

## 2022-03-30 DIAGNOSIS — I10 ESSENTIAL HYPERTENSION, BENIGN: ICD-10-CM

## 2022-03-30 DIAGNOSIS — I51.9 LV DYSFUNCTION: ICD-10-CM

## 2022-03-30 DIAGNOSIS — I49.3 PVC'S (PREMATURE VENTRICULAR CONTRACTIONS): ICD-10-CM

## 2022-03-30 DIAGNOSIS — R94.39 ABNORMAL STRESS TEST: Primary | ICD-10-CM

## 2022-03-30 PROCEDURE — 93242 EXT ECG>48HR<7D RECORDING: CPT

## 2022-03-30 PROCEDURE — 93244 EXT ECG>48HR<7D REV&INTERPJ: CPT | Performed by: INTERNAL MEDICINE

## 2022-03-30 PROCEDURE — 99214 OFFICE O/P EST MOD 30 MIN: CPT | Mod: 25 | Performed by: NURSE PRACTITIONER

## 2022-03-30 NOTE — PATIENT INSTRUCTIONS
Thanks for participating in a office visit with the [facility] clinic today.    Reviewed results of coronary angiogram showing mild non obstructive disease.   Risk modification such as good diet, exercise, good weight, well controlled blood pressures.   Last echocardiogram showed a mildly reduced pumping motion 45% ( normal 55%)  Will get a 7 day zio patch monitor to rule out high PVC burden.   Plan for follow up echocardiogram in 3 months to reassess LV function.       Follow up in 3 months with RICH     Please call my nurse at [phone] with any questions or concerns.    Scheduling phone number: [phone]  Reminder: Please bring in all current medications, over the counter supplements and vitamin bottles to your next appointment.

## 2022-03-30 NOTE — PROGRESS NOTES
Cardiology Clinic Progress Note  Vladimir Morse MRN# [id number]   YOB: 1955 Age: 66 year old     Reason For Visit:  Follow-up post angiogram   Primary Cardiologist:   Dr. Pires          History of Presenting Illness:      Vladimir Morse is a pleasant 66 year old patient who carries a past medical history significant for hypertension,  Hyperlipidemia, LV dysfunction (45%) and elevated coronary calcium score.    On 3/21/2022 he underwent a coronary angiogram due to abnormal nuclear stress test and coronary calcium score of 719.  Results showed mild nonobstructive atherosclerosis with a 20% lesion in the proximal RCA and 50% lesion in the high D1.  He returns to the office today accompanied by his wife for post angiogram follow-up.    He is feeling well on a cardiac standpoint, denies chest pain, shortness of breath, PND, orthopnea, presyncope, syncope, edema, or heart racing.  Occasionally he notes PVCs.  Upon exam, lungs are clear bilaterally, heart rate and rhythm regular, no palpitations, or lower extremity edema.  Right radial access site well-healed with no evidence of hematoma or bruit.    Last echocardiogram showed an ejection fraction of 45%, mild to moderate inferior and inferior lateral wall hypokinesis, normal RV size and function, and trace to mild aortic regurgitation.    Blood pressure is well controlled at 116/68, heart rate 56.  Last BMP and CBC were unremarkable  Lipid panel is excellent with a total cholesterol of 138, HDL 60, LDL 63, and triglycerides 77.  BMI 27.49    Activity consists of walking on a daily basis  Follows a heart healthy diet  Remains compliant with all medications.                   Assessment and Plan:     1.  Abnormal stress test/elevated coronary calcium score -coronary angiogram showed mild nonobstructive atherosclerosis with a 20% lesion in the proximal RCA and 50% lesion in the high D1.  Continue aspirin and statin.  Recommend risk modification with increased  exercise, weight loss, and heart healthy diet.    2.  LV dysfunction - Last echocardiogram showed an ejection fraction of 45%, mild to moderate inferior and inferior lateral wall hypokinesis, normal RV size and function, and trace to mild aortic regurgitation.  Continue on ramipril.  Patient reports occasional palpitations.  Recommend a 7-day Zio patch monitor to assess for PVC burden.  Will avoid adding low-dose beta-blocker due to bradycardia.  Further recommendations following results of monitor.  Follow-up limited echocardiogram in 3 months to reassess LV function.    3.  Hypertension -well controlled  4.  Hyperlipidemia -LDL at goal, continue statin           Thank you for allowing me to participate in this delightful patient's care. I have recommended he follow up in 3 months with [facility] with limited echo prior.       Neva Montenegro, APRN CNP         Review of Systems:     Review of Systems:  Skin:  Negative     Eyes:  Positive for glasses  ENT:  Negative    Respiratory:  Negative    Cardiovascular:  Negative  , Occasional PVCs  Gastroenterology: Positive for    Genitourinary:  Negative    Musculoskeletal:  Positive for arthritis  Neurologic:  Negative    Psychiatric:  Negative    Heme/Lymph/Imm:  Positive for night sweats  Endocrine:  Positive for                Physical Exam:     GEN:  In general, this is a well nourished male in no acute distress.  HEENT:  Pupils equal, round. Sclerae nonicteric. Clear oropharynx. Mucous membranes moist.  NECK: Supple, no masses appreciated. Trachea midline.  No JVD   C/V:  Regular rate and rhythm, no murmur, rub or gallop. No S3 or RV heave.   RESP: Respirations are unlabored. No use of accessory muscles. Clear to auscultation bilaterally without wheezing, rales, or rhonchi.  GI: Abdomen soft, nontender, nondistended. No HSM appreciated.   EXTREM: No LE edema. No cyanosis or clubbing.  NEURO: Alert and oriented, cooperative. No obvious focal deficits.   PSYCH: Normal  affect.  SKIN: Warm and dry. No rashes or petechiae appreciated.          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     BPPV (benign paroxysmal positional vertigo), unspecified laterality 07/03/2015     Gilbert's syndrome 12/13/2016     Hyperlipidemia LDL goal <100 11/28/2014     Hypertension 02/22/2018     Lipoma of skin and subcutaneous tissue 06/22/2012     Nephrolithiasis 06/22/2012     Primary osteoarthritis of right knee 12/22/2017     Recurrent intestinal obstruction (H) 07/27/2012    Resolved with conservative management, again on 6/27/2014 & 5/24/2021.     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              Past Surgical History:     Past Surgical History:   Procedure Laterality Date     ARTHROSCOPY KNEE Right 10/26/2015    Procedure: ARTHROSCOPY KNEE;  Surgeon: Rodrigue Cole MD;  Location:  OR     AS TOTAL KNEE ARTHROPLASTY Right 03/05/2018     COLONOSCOPY  11/23/2015    Dr. Freedman [facility]     COLONOSCOPY N/A 07/13/2021    Procedure: COLONOSCOPY;  Surgeon: Luzmaria Ny MD;  Location:  GI     CV CORONARY ANGIOGRAM N/A 3/21/2022    Procedure: Coronary Angiogram [[id number]];  Surgeon: Oma Panda MD;  Location:  HEART CARDIAC CATH LAB     CYSTOSCOPY, RETROGRADES, INSERT STENT URETER(S), COMBINED  06/29/2012    Procedure: COMBINED CYSTOSCOPY, RETROGRADES, INSERT STENT URETER(S);  LEFT URETEROSCOPY, LEFT URETERAL STENT PLACEMENT;  Surgeon: Timothy Ortega MD;  Location:  OR     EXCISE LESION BACK Left 09/21/2018    Procedure: EXCISE LESION BACK;  Excision  subcutaneous mass and skin lesion left back;  Surgeon: Prosper Loving MD;  Location: [facility] OR. Path: lipoma.     EXTRACORPOREAL SHOCK WAVE LITHOTRIPSY (ESWL)  12/20/2012    Procedure: EXTRACORPOREAL SHOCK WAVE LITHOTRIPSY (ESWL);  LEFT EXTRACORPOREAL SHOCKWAVE LITHOTRIPSY ;  Surgeon: Timothy Ortega MD;  Location: [facility] OR     HC KNEE ARTHROSCOPY, MED+LAT MENISCUS REPAIR Left 01/25/2010    Left knee diagnostic arthroscopy and removal of loose bodies and medial and lateral meniscal debridement.     PERONEAL NERVE DECOMPRESSION Left 02/11/2010    and Excision proximal tibiofibular joint ganglion cyst.     TONSILLECTOMY & ADENOIDECTOMY  1964    T&A as a child     ZZC NONSPECIFIC PROCEDURE  1955    Perforated bowel; infant. Ilio-colonic anastomosis?              Allergies:   No known drug allergies       Data:   All laboratory data reviewed:    LAST CHOLESTEROL:  Lab Results   Component Value Date    CHOL 138 02/09/2022    CHOL 113 10/05/2020     Lab Results   Component Value Date    HDL 60 02/09/2022    HDL 53 10/05/2020     Lab Results   Component Value Date    LDL 63 02/09/2022    LDL 46 10/05/2020     Lab Results   Component Value Date    TRIG 77 02/09/2022    TRIG 70 10/05/2020     Lab Results   Component Value Date    CHOLHDLRATIO 3.6 10/12/2015       LAST BMP:  Lab Results   Component Value Date     03/21/2022     06/03/2021      Lab Results   Component Value Date    POTASSIUM 4.0 03/21/2022    POTASSIUM 4.2 06/03/2021     Lab Results   Component Value Date    CHLORIDE 107 03/21/2022    CHLORIDE 108 06/03/2021     Lab Results   Component Value Date    FREDDY 8.8 03/21/2022    FREDDY 9.1 06/03/2021     Lab Results   Component Value Date    CO2 31 03/21/2022    CO2 28 06/03/2021     Lab Results   Component Value Date    BUN 15 03/21/2022    BUN 11 06/03/2021     Lab Results   Component Value Date    CR 0.77 03/21/2022    CR 0.72 06/03/2021     Lab Results   Component Value Date    GLC 90 03/21/2022    GLC 93  06/03/2021       LAST CBC:  Lab Results   Component Value Date    WBC 6.1 03/21/2022    WBC 5.2 06/03/2021     Lab Results   Component Value Date    RBC 4.92 03/21/2022    RBC 5.17 06/03/2021     Lab Results   Component Value Date    HGB 14.4 03/21/2022    HGB 15.0 06/03/2021     Lab Results   Component Value Date    HCT 44.9 03/21/2022    HCT 47.2 06/03/2021     Lab Results   Component Value Date    MCV 91 03/21/2022    MCV 91 06/03/2021     Lab Results   Component Value Date    MCH 29.3 03/21/2022    MCH 29.0 06/03/2021     Lab Results   Component Value Date    MCHC 32.1 03/21/2022    MCHC 31.8 06/03/2021     Lab Results   Component Value Date    RDW 13.0 03/21/2022    RDW 13.1 06/03/2021     Lab Results   Component Value Date     03/21/2022     06/03/2021

## 2022-03-30 NOTE — LETTER
3/30/2022    Reyes Hinkle MD  [id number] [facility] [place] [address]    RE: Vladimir Morse       Dear Colleague,     I had the pleasure of seeing Vladimir Morse in the [facility].  Cardiology Clinic Progress Note  Vladimir Morse MRN# [id number]   YOB: 1955 Age: 66 year old     Reason For Visit:  Follow-up post angiogram   Primary Cardiologist:   Dr. Pires          History of Presenting Illness:      Vladimir Morse is a pleasant 66 year old patient who carries a past medical history significant for hypertension,  Hyperlipidemia, LV dysfunction (45%) and elevated coronary calcium score.    On 3/21/2022 he underwent a coronary angiogram due to abnormal nuclear stress test and coronary calcium score of 719.  Results showed mild nonobstructive atherosclerosis with a 20% lesion in the proximal RCA and 50% lesion in the high D1.  He returns to the office today accompanied by his wife for post angiogram follow-up.    He is feeling well on a cardiac standpoint, denies chest pain, shortness of breath, PND, orthopnea, presyncope, syncope, edema, or heart racing.  Occasionally he notes PVCs.  Upon exam, lungs are clear bilaterally, heart rate and rhythm regular, no palpitations, or lower extremity edema.  Right radial access site well-healed with no evidence of hematoma or bruit.    Last echocardiogram showed an ejection fraction of 45%, mild to moderate inferior and inferior lateral wall hypokinesis, normal RV size and function, and trace to mild aortic regurgitation.    Blood pressure is well controlled at 116/68, heart rate 56.  Last BMP and CBC were unremarkable  Lipid panel is excellent with a total cholesterol of 138, HDL 60, LDL 63, and triglycerides 77.  BMI 27.49    Activity consists of walking on a daily basis  Follows a heart healthy diet  Remains compliant with all medications.                   Assessment and Plan:     1.  Abnormal stress test/elevated coronary calcium score  -coronary angiogram showed mild nonobstructive atherosclerosis with a 20% lesion in the proximal RCA and 50% lesion in the high D1.  Continue aspirin and statin.  Recommend risk modification with increased exercise, weight loss, and heart healthy diet.    2.  LV dysfunction - Last echocardiogram showed an ejection fraction of 45%, mild to moderate inferior and inferior lateral wall hypokinesis, normal RV size and function, and trace to mild aortic regurgitation.  Continue on ramipril.  Patient reports occasional palpitations.  Recommend a 7-day Zio patch monitor to assess for PVC burden.  Will avoid adding low-dose beta-blocker due to bradycardia.  Further recommendations following results of monitor.  Follow-up limited echocardiogram in 3 months to reassess LV function.    3.  Hypertension -well controlled  4.  Hyperlipidemia -LDL at goal, continue statin           Thank you for allowing me to participate in this delightful patient's care. I have recommended he follow up in 3 months with [facility] with limited echo prior.       VERO Lux CNP         Review of Systems:     Review of Systems:  Skin:  Negative     Eyes:  Positive for glasses  ENT:  Negative    Respiratory:  Negative    Cardiovascular:  Negative  , Occasional PVCs  Gastroenterology: Positive for    Genitourinary:  Negative    Musculoskeletal:  Positive for arthritis  Neurologic:  Negative    Psychiatric:  Negative    Heme/Lymph/Imm:  Positive for night sweats  Endocrine:  Positive for                Physical Exam:     GEN:  In general, this is a well nourished male in no acute distress.  HEENT:  Pupils equal, round. Sclerae nonicteric. Clear oropharynx. Mucous membranes moist.  NECK: Supple, no masses appreciated. Trachea midline.  No JVD   C/V:  Regular rate and rhythm, no murmur, rub or gallop. No S3 or RV heave.   RESP: Respirations are unlabored. No use of accessory muscles. Clear to auscultation bilaterally without wheezing, rales, or  rhonchi.  GI: Abdomen soft, nontender, nondistended. No HSM appreciated.   EXTREM: No LE edema. No cyanosis or clubbing.  NEURO: Alert and oriented, cooperative. No obvious focal deficits.   PSYCH: Normal affect.  SKIN: Warm and dry. No rashes or petechiae appreciated.          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     BPPV (benign paroxysmal positional vertigo), unspecified laterality 07/03/2015     Gilbert's syndrome 12/13/2016     Hyperlipidemia LDL goal <100 11/28/2014     Hypertension 02/22/2018     Lipoma of skin and subcutaneous tissue 06/22/2012     Nephrolithiasis 06/22/2012     Primary osteoarthritis of right knee 12/22/2017     Recurrent intestinal obstruction (H) 07/27/2012    Resolved with conservative management, again on 6/27/2014 & 5/24/2021.     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              Past Surgical History:     Past Surgical History:   Procedure Laterality Date     ARTHROSCOPY KNEE Right 10/26/2015    Procedure: ARTHROSCOPY KNEE;  Surgeon: Rodrigue Cole MD;  Location:  OR     AS TOTAL KNEE ARTHROPLASTY Right 03/05/2018     COLONOSCOPY  11/23/2015    Dr. Freedman [facility]     COLONOSCOPY N/A 07/13/2021    Procedure: COLONOSCOPY;  Surgeon: Luzmaria Ny MD;  Location:  GI     CV CORONARY ANGIOGRAM N/A 3/21/2022    Procedure: Coronary Angiogram [[id number]];  Surgeon: Oma Panda MD;  Location:  HEART CARDIAC CATH LAB     CYSTOSCOPY, RETROGRADES, INSERT STENT URETER(S), COMBINED  06/29/2012    Procedure: COMBINED CYSTOSCOPY, RETROGRADES,  INSERT STENT URETER(S);  LEFT URETEROSCOPY, LEFT URETERAL STENT PLACEMENT;  Surgeon: Timothy Ortega MD;  Location: [facility] OR     EXCISE LESION BACK Left 09/21/2018    Procedure: EXCISE LESION BACK;  Excision subcutaneous mass and skin lesion left back;  Surgeon: Prosper Loving MD;  Location: [facility] OR. Path: lipoma.     EXTRACORPOREAL SHOCK WAVE LITHOTRIPSY (ESWL)  12/20/2012    Procedure: EXTRACORPOREAL SHOCK WAVE LITHOTRIPSY (ESWL);  LEFT EXTRACORPOREAL SHOCKWAVE LITHOTRIPSY ;  Surgeon: Timothy Ortega MD;  Location: [facility] OR     HC KNEE ARTHROSCOPY, MED+LAT MENISCUS REPAIR Left 01/25/2010    Left knee diagnostic arthroscopy and removal of loose bodies and medial and lateral meniscal debridement.     PERONEAL NERVE DECOMPRESSION Left 02/11/2010    and Excision proximal tibiofibular joint ganglion cyst.     TONSILLECTOMY & ADENOIDECTOMY  1964    T&A as a child     ZZC NONSPECIFIC PROCEDURE  1955    Perforated bowel; infant. Ilio-colonic anastomosis?              Allergies:   No known drug allergies       Data:   All laboratory data reviewed:    LAST CHOLESTEROL:  Lab Results   Component Value Date    CHOL 138 02/09/2022    CHOL 113 10/05/2020     Lab Results   Component Value Date    HDL 60 02/09/2022    HDL 53 10/05/2020     Lab Results   Component Value Date    LDL 63 02/09/2022    LDL 46 10/05/2020     Lab Results   Component Value Date    TRIG 77 02/09/2022    TRIG 70 10/05/2020     Lab Results   Component Value Date    CHOLHDLRATIO 3.6 10/12/2015       LAST BMP:  Lab Results   Component Value Date     03/21/2022     06/03/2021      Lab Results   Component Value Date    POTASSIUM 4.0 03/21/2022    POTASSIUM 4.2 06/03/2021     Lab Results   Component Value Date    CHLORIDE 107 03/21/2022    CHLORIDE 108 06/03/2021     Lab Results   Component Value Date    FREDDY 8.8 03/21/2022    FRDEDY 9.1 06/03/2021     Lab Results   Component Value Date    CO2 31 03/21/2022    CO2 28 06/03/2021     Lab Results   Component Value  Date    BUN 15 03/21/2022    BUN 11 06/03/2021     Lab Results   Component Value Date    CR 0.77 03/21/2022    CR 0.72 06/03/2021     Lab Results   Component Value Date    GLC 90 03/21/2022    GLC 93 06/03/2021       LAST CBC:  Lab Results   Component Value Date    WBC 6.1 03/21/2022    WBC 5.2 06/03/2021     Lab Results   Component Value Date    RBC 4.92 03/21/2022    RBC 5.17 06/03/2021     Lab Results   Component Value Date    HGB 14.4 03/21/2022    HGB 15.0 06/03/2021     Lab Results   Component Value Date    HCT 44.9 03/21/2022    HCT 47.2 06/03/2021     Lab Results   Component Value Date    MCV 91 03/21/2022    MCV 91 06/03/2021     Lab Results   Component Value Date    MCH 29.3 03/21/2022    MCH 29.0 06/03/2021     Lab Results   Component Value Date    MCHC 32.1 03/21/2022    MCHC 31.8 06/03/2021     Lab Results   Component Value Date    RDW 13.0 03/21/2022    RDW 13.1 06/03/2021     Lab Results   Component Value Date     03/21/2022     06/03/2021       Thank you for allowing me to participate in the care of your patient.      Sincerely,     VERO Lux CNP     [facility]  cc:   No referring provider defined for this encounter.

## 2022-04-21 ENCOUNTER — MYC MEDICAL ADVICE (OUTPATIENT)
Dept: CARDIOLOGY | Facility: CLINIC | Age: 67
End: 2022-04-21
Payer: COMMERCIAL

## 2022-04-21 DIAGNOSIS — I47.10 PAROXYSMAL SUPRAVENTRICULAR TACHYCARDIA (H): ICD-10-CM

## 2022-04-21 DIAGNOSIS — I47.29 PAROXYSMAL VENTRICULAR TACHYCARDIA (H): ICD-10-CM

## 2022-04-21 DIAGNOSIS — I49.3 PVC'S (PREMATURE VENTRICULAR CONTRACTIONS): Primary | ICD-10-CM

## 2022-04-21 RX ORDER — METOPROLOL SUCCINATE 25 MG/1
12.5 TABLET, EXTENDED RELEASE ORAL DAILY
Qty: 45 TABLET | Refills: 0 | Status: SHIPPED | OUTPATIENT
Start: 2022-04-21 | End: 2022-06-30

## 2022-04-21 NOTE — TELEPHONE ENCOUNTER
Zio Patch monitor showed 6 runs of VT  with the longest 10 beats  SVT runs with the longest 18 beats.   Average HR is 64  Recommend a low dose toprol 12.5 daily.   Monitor HR and blood pressure daily. < 140/90, HR >50  Follow up as scheduled.

## 2022-04-21 NOTE — TELEPHONE ENCOUNTER
Called pt with Ziopatch results & recommendations from Neva Montenegro NP. Prescription escripted to [facility]. Will send recommendations via My Chart also. Willis DUNCAN

## 2022-05-14 ENCOUNTER — OFFICE VISIT (OUTPATIENT)
Dept: URGENT CARE | Facility: URGENT CARE | Age: 67
End: 2022-05-14
Payer: COMMERCIAL

## 2022-05-14 VITALS
DIASTOLIC BLOOD PRESSURE: 81 MMHG | OXYGEN SATURATION: 99 % | HEART RATE: 57 BPM | SYSTOLIC BLOOD PRESSURE: 143 MMHG | TEMPERATURE: 97.3 F

## 2022-05-14 DIAGNOSIS — N39.0 URINARY TRACT INFECTION WITH HEMATURIA, SITE UNSPECIFIED: ICD-10-CM

## 2022-05-14 DIAGNOSIS — R31.9 URINARY TRACT INFECTION WITH HEMATURIA, SITE UNSPECIFIED: ICD-10-CM

## 2022-05-14 DIAGNOSIS — R31.9 HEMATURIA, UNSPECIFIED TYPE: Primary | ICD-10-CM

## 2022-05-14 LAB
ALBUMIN UR-MCNC: ABNORMAL MG/DL
AMORPH CRY #/AREA URNS HPF: ABNORMAL /HPF
APPEARANCE UR: ABNORMAL
BACTERIA #/AREA URNS HPF: ABNORMAL /HPF
BILIRUB UR QL STRIP: NEGATIVE
COLOR UR AUTO: ABNORMAL
GLUCOSE UR STRIP-MCNC: NEGATIVE MG/DL
HGB UR QL STRIP: ABNORMAL
KETONES UR STRIP-MCNC: NEGATIVE MG/DL
LEUKOCYTE ESTERASE UR QL STRIP: NEGATIVE
NITRATE UR QL: NEGATIVE
PH UR STRIP: 6.5 [PH] (ref 5–7)
RBC #/AREA URNS AUTO: >100 /HPF
SP GR UR STRIP: 1.02 (ref 1–1.03)
UROBILINOGEN UR STRIP-ACNC: 1 E.U./DL
WBC #/AREA URNS AUTO: ABNORMAL /HPF

## 2022-05-14 PROCEDURE — 81001 URINALYSIS AUTO W/SCOPE: CPT

## 2022-05-14 PROCEDURE — 99213 OFFICE O/P EST LOW 20 MIN: CPT | Performed by: FAMILY MEDICINE

## 2022-05-14 PROCEDURE — 87086 URINE CULTURE/COLONY COUNT: CPT | Performed by: FAMILY MEDICINE

## 2022-05-14 RX ORDER — CEFDINIR 300 MG/1
300 CAPSULE ORAL 2 TIMES DAILY
Qty: 10 CAPSULE | Refills: 0 | Status: SHIPPED | OUTPATIENT
Start: 2022-05-14 | End: 2022-05-19

## 2022-05-14 NOTE — PROGRESS NOTES
SUBJECTIVE:   Vladimir Morse is a 66 year old male who  presents today for a possible UTI. Symptoms of hematuria early this morning.  sudden onsetand moderate.  There is no history of fever, chills, nausea or vomiting.  This patient does not have a history of urinary tract infections.     Has history of kidney stones, this is usually very painful while this current symptoms has no flank tenderness.    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     BPPV (benign paroxysmal positional vertigo), unspecified laterality 07/03/2015     Gilbert's syndrome 12/13/2016     Hyperlipidemia LDL goal <100 11/28/2014     Hypertension 02/22/2018     Lipoma of skin and subcutaneous tissue 06/22/2012     Nephrolithiasis 06/22/2012     Primary osteoarthritis of right knee 12/22/2017     Recurrent intestinal obstruction (H) 07/27/2012    Resolved with conservative management, again on 6/27/2014 & 5/24/2021.     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     Current Outpatient Medications   Medication Sig Dispense Refill     aspirin 81 MG EC tablet Take 81 mg by mouth daily       atorvastatin (LIPITOR) 20 MG tablet TAKE 1 TABLET BY MOUTH EVERY DAY 90 tablet 0     metoprolol succinate ER (TOPROL-XL) 25 MG 24 hr tablet Take 0.5 tablets (12.5 mg) by mouth daily 45 tablet 0     ramipril (ALTACE) 10 MG capsule Take 1 capsule (10 mg) by mouth daily 90 capsule 3     Social History     Tobacco Use     Smoking status: Never Smoker     Smokeless tobacco: Never Used   Substance Use Topics     Alcohol use: Yes     Comment: rarely       ROS:   Review of  systems negative except as stated above.    OBJECTIVE:  BP (!) 143/81   Pulse 57   Temp 97.3  F (36.3  C)   SpO2 99%   GENERAL APPEARANCE: healthy, alert and no distress  PSYCH: mentation appears normal and affect normal/bright    Results for orders placed or performed in visit on 05/14/22   UA macro with reflex to Microscopic and Culture - Clinc Collect     Status: Abnormal    Specimen: Urine, Clean Catch   Result Value Ref Range    Color Urine Light Orange (A) Colorless, Straw, Light Yellow, Yellow    Appearance Urine Cloudy (A) Clear    Glucose Urine Negative Negative mg/dL    Bilirubin Urine Negative Negative    Ketones Urine Negative Negative mg/dL    Specific Gravity Urine 1.025 1.003 - 1.035    Blood Urine Large (A) Negative    pH Urine 6.5 5.0 - 7.0    Protein Albumin Urine Trace (A) Negative mg/dL    Urobilinogen Urine 1.0 0.2, 1.0 E.U./dL    Nitrite Urine Negative Negative    Leukocyte Esterase Urine Negative Negative   Urine Microscopic     Status: Abnormal   Result Value Ref Range    Bacteria Urine Many (A) None Seen /HPF    RBC Urine >100 (A) 0-2 /HPF /HPF    WBC Urine 0-5 0-5 /HPF /HPF    Amorphous Crystals Urine Few (A) None Seen /HPF    Narrative    Urine Culture not indicated       ASSESSMENT/PLAN:   (R31.9) Hematuria, unspecified type  (primary encounter diagnosis)  Plan: UA macro with reflex to Microscopic and Culture        - Clinc Collect, Urine Microscopic, Urine         Culture Aerobic Bacterial - lab collect,         cefdinir (OMNICEF) 300 MG capsule            (N39.0,  R31.9) Urinary tract infection with hematuria, site unspecified  Plan: cefdinir (OMNICEF) 300 MG capsule            Given history of kidney stone and abnormal UA, will empirically treat for presumptive UTI with RX Omnicef.  Will follow up on urine culture and adjust medication if needed.  Drink plenty of fluids.  Reviewed that further evaluation for hematuria may be needed    Follow up with primary provider in 1-2 weeks for  recheck    Abel Groves MD  May 14, 2022 12:31 PM

## 2022-05-17 LAB — BACTERIA UR CULT: NO GROWTH

## 2022-05-18 ENCOUNTER — MYC MEDICAL ADVICE (OUTPATIENT)
Dept: FAMILY MEDICINE | Facility: CLINIC | Age: 67
End: 2022-05-18
Payer: COMMERCIAL

## 2022-05-18 DIAGNOSIS — N20.0 NEPHROLITHIASIS: Primary | ICD-10-CM

## 2022-05-18 DIAGNOSIS — R93.5 ABNORMAL CT OF THE ABDOMEN: ICD-10-CM

## 2022-05-18 DIAGNOSIS — R31.9 HEMATURIA, UNSPECIFIED TYPE: ICD-10-CM

## 2022-05-18 NOTE — TELEPHONE ENCOUNTER
Dr. Hinkle- see Avaz message below.  Please advise.  Angelica Vera, RN      5/14/2022  [facility] [place]    ASSESSMENT/PLAN:   (R31.9) Hematuria, unspecified type  (primary encounter diagnosis)  Plan: UA macro with reflex to Microscopic and Culture        - Clinc Collect, Urine Microscopic, Urine         Culture Aerobic Bacterial - lab collect,         cefdinir (OMNICEF) 300 MG capsule             (N39.0,  R31.9) Urinary tract infection with hematuria, site unspecified  Plan: cefdinir (OMNICEF) 300 MG capsule             Given history of kidney stone and abnormal UA, will empirically treat for presumptive UTI with RX Omnicef.  Will follow up on urine culture and adjust medication if needed.  Drink plenty of fluids.  Reviewed that further evaluation for hematuria may be needed     Follow up with primary provider in 1-2 weeks for recheck     Abel Groves MD  May 14, 2022 12:31 PM

## 2022-05-23 ENCOUNTER — MYC MEDICAL ADVICE (OUTPATIENT)
Dept: FAMILY MEDICINE | Facility: CLINIC | Age: 67
End: 2022-05-23
Payer: COMMERCIAL

## 2022-05-24 ENCOUNTER — HOSPITAL ENCOUNTER (OUTPATIENT)
Dept: ULTRASOUND IMAGING | Facility: CLINIC | Age: 67
Discharge: HOME | End: 2022-05-24
Attending: FAMILY MEDICINE | Admitting: FAMILY MEDICINE
Payer: COMMERCIAL

## 2022-05-24 DIAGNOSIS — R31.9 HEMATURIA, UNSPECIFIED TYPE: ICD-10-CM

## 2022-05-24 DIAGNOSIS — N20.0 NEPHROLITHIASIS: ICD-10-CM

## 2022-05-24 DIAGNOSIS — R93.5 ABNORMAL CT OF THE ABDOMEN: ICD-10-CM

## 2022-05-24 PROCEDURE — 76770 US EXAM ABDO BACK WALL COMP: CPT

## 2022-05-25 ENCOUNTER — OFFICE VISIT (OUTPATIENT)
Dept: INTERNAL MEDICINE | Facility: CLINIC | Age: 67
End: 2022-05-25
Payer: COMMERCIAL

## 2022-05-25 ENCOUNTER — HOSPITAL ENCOUNTER (OUTPATIENT)
Dept: CT IMAGING | Facility: CLINIC | Age: 67
Discharge: HOME | End: 2022-05-25
Attending: INTERNAL MEDICINE | Admitting: INTERNAL MEDICINE
Payer: COMMERCIAL

## 2022-05-25 VITALS
DIASTOLIC BLOOD PRESSURE: 74 MMHG | OXYGEN SATURATION: 100 % | WEIGHT: 177 LBS | TEMPERATURE: 98 F | HEIGHT: 68 IN | BODY MASS INDEX: 26.83 KG/M2 | RESPIRATION RATE: 16 BRPM | SYSTOLIC BLOOD PRESSURE: 138 MMHG | HEART RATE: 78 BPM

## 2022-05-25 DIAGNOSIS — Z87.19 HISTORY OF SMALL BOWEL OBSTRUCTION: ICD-10-CM

## 2022-05-25 DIAGNOSIS — R10.32 LLQ ABDOMINAL PAIN: ICD-10-CM

## 2022-05-25 DIAGNOSIS — Z87.19 HISTORY OF DIVERTICULITIS: ICD-10-CM

## 2022-05-25 DIAGNOSIS — R10.32 LLQ ABDOMINAL PAIN: Primary | ICD-10-CM

## 2022-05-25 PROCEDURE — 250N000011 HC RX IP 250 OP 636: Performed by: INTERNAL MEDICINE

## 2022-05-25 PROCEDURE — 74177 CT ABD & PELVIS W/CONTRAST: CPT

## 2022-05-25 PROCEDURE — 99214 OFFICE O/P EST MOD 30 MIN: CPT | Performed by: INTERNAL MEDICINE

## 2022-05-25 PROCEDURE — 250N000009 HC RX 250: Performed by: INTERNAL MEDICINE

## 2022-05-25 RX ORDER — IOPAMIDOL 755 MG/ML
500 INJECTION, SOLUTION INTRAVASCULAR ONCE
Status: COMPLETED | OUTPATIENT
Start: 2022-05-25 | End: 2022-05-25

## 2022-05-25 RX ADMIN — IOPAMIDOL 80 ML: 755 INJECTION, SOLUTION INTRAVENOUS at 11:02

## 2022-05-25 RX ADMIN — SODIUM CHLORIDE 79 ML: 9 INJECTION, SOLUTION INTRAVENOUS at 10:58

## 2022-05-25 ASSESSMENT — PAIN SCALES - GENERAL: PAINLEVEL: MODERATE PAIN (4)

## 2022-05-25 NOTE — PROGRESS NOTES
"  Assessment & Plan   (R10.32) LLQ abdominal pain  (primary encounter diagnosis)  Comment: We discussed options of empirically treating for diverticulitis with a suitable antibiotic, vs diagnostic imaging. Later in the evening after his CT scan, we discussed findings of bilateral renal stones (none of which seemed to be \"passing\" from the kidney), but no acute-appearing pathology.   We discussed that his pain could conceivably be residual from a recently passed stone, or may be of abdominal wall origin.   We agreed to observe his symptoms for now. He should contact the office if pains persist or worsen.   Plan: CT Abdomen Pelvis w Contrast          (Z87.19) History of diverticulitis  (Z87.19) History of small bowel obstruction  Comment: No evidence of a surgical abdomen by exam or by CT.     Patient Instructions   It would be ideal to try to get a CT scan to be more certain about what is causing your pain or whether there is diverticulitis present.   We will contact radiology now to see how soon this can be scheduled.     You will be due to see Dr Hinkle again in around February 2023. See other specialists as they advise.             Return in about 9 months (around 2/25/2023) for Annual Wellness Visit with Dr Hinkle.    Zoran Edgar MD, MD  [facility] SHANNA Gilbert is a 66 year old who presents for the following health issues : LLQ pain    History of Present Illness       Reason for visit:  Lower left abdominal burning pain.  Symptom onset:  3-7 days ago  Symptoms include:  Lower left abdominal burning pain  Symptom intensity:  Moderate  Symptom progression:  Staying the same  Had these symptoms before:  No  What makes it worse:  No  What makes it better:  No    He eats 0-1 servings of fruits and vegetables daily.He consumes 0 sweetened beverage(s) daily.He exercises with enough effort to increase his heart rate 30 to 60 minutes per day.  He exercises with enough effort to " "increase his heart rate 5 days per week.   He is taking medications regularly.       The patient is worked in for an appointment today with a concern of a constant burning discomfort in his left lower abdomen, present for about 7 to 8 days.  Symptoms remind him of prior episodes of diverticulitis, although he has not had this now for some time.    Of note, the patient recently completed a 5-day course of Omnicef prescribed in urgent care after noting gross hematuria.  He was treated empirically for a possible UTI, although urinalysis showed no white cells, nitrate or leukocyte esterase.  He also denied having any flank pain, or fevers.    The patient's current left lower abdominal burning discomfort is not noted to be aggravated by eating or improved by eating or having normal bowel movements.  He has not had diarrhea melena or hematochezia.  No acute urinary complaints.    Past medical, family and social histories as well as medications reviewed and updated as needed.    Review of Systems   REVIEW OF SYSTEMS: The following systems have been completely reviewed and are negative except as noted above:   Constitutional, respiratory, cardiovascular, gastrointestinal, genitourinary, musculoskeletal systems.        Objective    /74   Pulse 78   Temp 98  F (36.7  C) (Oral)   Resp 16   Ht 1.727 m (5' 8\")   Wt 80.3 kg (177 lb)   SpO2 100%   BMI 26.91 kg/m    Body mass index is 26.91 kg/m .     Physical Exam   GENERAL: healthy, alert and no distress  RESP: lungs clear to auscultation - no rales, rhonchi or wheezes  CV: regular rate and rhythm, normal S1 S2, no S3 or S4, no murmur, click or rub, no peripheral edema and peripheral pulses strong  ABDOMEN: tenderness LLQ, no organomegaly or masses and bowel sounds normal   (male): normal male genitalia without lesions or urethral discharge, no hernia  MS: no gross musculoskeletal defects noted, no edema    Recent Results (from the past 24 hour(s))   CT Abdomen " "Pelvis w Contrast    Narrative    CT ABDOMEN PELVIS WITH CONTRAST 5/25/2022 11:12 AM    CLINICAL HISTORY: Diverticulitis suspected. One week history of left  lower quadrant abdominal \"burning discomfort\", prior history of   diverticulitis.     TECHNIQUE: CT scan of the abdomen and pelvis was performed following  injection of IV contrast. Multiplanar reformats were obtained. Dose  reduction techniques were used.  CONTRAST: 79 mL Isovue-370    COMPARISON: Ultrasound 5/24/2022, CT 6/3/2021.    FINDINGS:   LOWER CHEST: Normal.    HEPATOBILIARY: Unchanged low-density lesions in the liver likely cysts  or hemangiomas that do not require specific follow-up. No new liver  lesion. Gallbladder unremarkable.    PANCREAS: Normal.    SPLEEN: Normal.    ADRENAL GLANDS: Normal.    KIDNEYS/BLADDER: Bilateral renal cysts are evident, unchanged, and do  not require specific follow-up. There are two nonobstructing  calcifications in the right kidney, the largest of which measures 4  mm. There are three calcifications in the left kidney, the largest of  which measures 5 mm. No worrisome renal mass or hydronephrosis.  Urinary bladder is unremarkable.    BOWEL: Normal with no obstruction or acute inflammatory change.  Nothing for appendicitis. Sigmoid diverticulosis without evidence of  diverticulitis.    PELVIC ORGANS: Enlarged prostate measures 5.4 cm transversely.    ADDITIONAL FINDINGS: No adenopathy or ascites. No aortic aneurysm.  Small fat-containing umbilical hernia.    MUSCULOSKELETAL: Unremarkable.      Impression    IMPRESSION:   1.  No evidence of acute diverticulitis.  2.  Nonobstructing bilateral nephrolithiasis.     LOLITA MORIN MD         SYSTEM ID:  [id number]               "

## 2022-05-25 NOTE — PATIENT INSTRUCTIONS
It would be ideal to try to get a CT scan to be more certain about what is causing your pain or whether there is diverticulitis present.   We will contact radiology now to see how soon this can be scheduled.     You will be due to see Dr Hinkle again in around February 2023. See other specialists as they advise.

## 2022-05-29 ENCOUNTER — DOCUMENTATION ONLY (OUTPATIENT)
Dept: FAMILY MEDICINE | Facility: CLINIC | Age: 67
End: 2022-05-29

## 2022-05-30 NOTE — RESULT ENCOUNTER NOTE
The symptoms you are describing are not related to your kidneys.  I recommend we have a virtual visit and discuss additional options.  I will have my staff contact you  Reyes Hinkle MD

## 2022-05-31 ENCOUNTER — TELEPHONE (OUTPATIENT)
Dept: FAMILY MEDICINE | Facility: CLINIC | Age: 67
End: 2022-05-31
Payer: COMMERCIAL

## 2022-05-31 NOTE — TELEPHONE ENCOUNTER
Called and spoke with patient. Relayed provider message below. Scheduled pt for 6/2 with Reyes Hinkle MD to discuss continuing symptoms.    Smiley Dorsey RN

## 2022-05-31 NOTE — TELEPHONE ENCOUNTER
----- Message from Reyes Hinkle MD sent at 5/30/2022  2:11 PM CDT -----  The symptoms you are describing are not related to your kidneys.  I recommend we have a virtual visit and discuss additional options.  I will have my staff contact you  Reyes Hinkle MD

## 2022-06-02 ENCOUNTER — VIRTUAL VISIT (OUTPATIENT)
Dept: FAMILY MEDICINE | Facility: CLINIC | Age: 67
End: 2022-06-02
Payer: COMMERCIAL

## 2022-06-02 DIAGNOSIS — Z23 ENCOUNTER FOR IMMUNIZATION: ICD-10-CM

## 2022-06-02 DIAGNOSIS — R10.32 ABDOMINAL PAIN, LEFT LOWER QUADRANT: ICD-10-CM

## 2022-06-02 DIAGNOSIS — R31.0 GROSS HEMATURIA: Primary | ICD-10-CM

## 2022-06-02 PROCEDURE — 99214 OFFICE O/P EST MOD 30 MIN: CPT | Mod: 95 | Performed by: FAMILY MEDICINE

## 2022-06-02 NOTE — PROGRESS NOTES
Vladimir is a 66 year old who is being evaluated via a billable video visit.      How would you like to obtain your AVS? MyChart  If the video visit is dropped, the invitation should be resent by: Text to cell phone: [phone]  Will anyone else be joining your video visit? No    Video Start Time: 4:08 PM    Assessment & Plan   Problem List Items Addressed This Visit     Abdominal pain, left lower quadrant     Pain has resolved.  Entire episode consistent with a passed kidney stone.  Discussed           Encounter for immunization     Recommended           Relevant Orders    PPSV23, IM/SUBQ (2+ YRS) - [id number]    Gross hematuria - Primary     Evaluation showed nonobstructing kidney stones.  Pain is resolved consistent with passed stone.  Recheck urine for resolution of hematuria           Relevant Orders    UA Macro with Reflex to Micro and Culture - lab collect                        Return for appt tomorrow lab/nurse (immunization) only.    Reyes Hinkle MD  [facility]    Subjective   Vladimir is a 66 year old who presents for the following health issues  accompanied by his spouse.    HPI     Pain History:  When did you first notice your pain? - Acute Pain   Have you seen anyone else for your pain?   Where in your body do you have pain? Abdominal/Flank Pain  Onset/Duration:   Description:   Character:   Location:   Radiation:   Intensity:   Progression of Symptoms:    Accompanying Signs & Symptoms:  Fever/Chills:   Gas/Bloating:   Nausea:   Vomitting:   Diarrhea:   Constipation:   Dysuria or Hematuria:   History:   Trauma:   Previous similar pain:   Previous tests done:   Precipitating factors:   Does the pain change with:     Food:     Bowel Movement:     Urination:    Other factors:    Therapies tried and outcome:             Review of Systems   No systemic symptoms      Objective           Vitals:  No vitals were obtained today due to virtual visit.    Physical Exam   GENERAL:  Healthy, alert and no distress  EYES: Eyes grossly normal to inspection.  No discharge or erythema, or obvious scleral/conjunctival abnormalities.  RESP: No audible wheeze, cough, or visible cyanosis.  No visible retractions or increased work of breathing.    SKIN: Visible skin clear. No significant rash, abnormal pigmentation or lesions.  NEURO: Cranial nerves grossly intact.  Mentation and speech appropriate for age.  PSYCH: Mentation appears normal, affect normal/bright, judgement and insight intact, normal speech and appearance well-groomed.                Video-Visit Details    Type of service:  Video Visit    Video End Time:4:24    Originating Location (pt. Location): Home    Distant Location (provider location):  [facility] [place]     Platform used for Video Visit: Baldemar Hinkle MD

## 2022-06-03 ENCOUNTER — ALLIED HEALTH/NURSE VISIT (OUTPATIENT)
Dept: FAMILY MEDICINE | Facility: CLINIC | Age: 67
End: 2022-06-03

## 2022-06-03 ENCOUNTER — LAB (OUTPATIENT)
Dept: LAB | Facility: CLINIC | Age: 67
End: 2022-06-03
Payer: COMMERCIAL

## 2022-06-03 DIAGNOSIS — Z23 NEED FOR VACCINATION: Primary | ICD-10-CM

## 2022-06-03 DIAGNOSIS — R31.0 GROSS HEMATURIA: ICD-10-CM

## 2022-06-03 LAB
ALBUMIN UR-MCNC: NEGATIVE MG/DL
APPEARANCE UR: CLEAR
BACTERIA #/AREA URNS HPF: ABNORMAL /HPF
BILIRUB UR QL STRIP: NEGATIVE
COLOR UR AUTO: YELLOW
GLUCOSE UR STRIP-MCNC: NEGATIVE MG/DL
HGB UR QL STRIP: ABNORMAL
KETONES UR STRIP-MCNC: NEGATIVE MG/DL
LEUKOCYTE ESTERASE UR QL STRIP: NEGATIVE
MUCOUS THREADS #/AREA URNS LPF: PRESENT /LPF
NITRATE UR QL: NEGATIVE
PH UR STRIP: 6 [PH] (ref 5–7)
RBC #/AREA URNS AUTO: ABNORMAL /HPF
SP GR UR STRIP: >=1.03 (ref 1–1.03)
SQUAMOUS #/AREA URNS AUTO: ABNORMAL /LPF
UROBILINOGEN UR STRIP-ACNC: 0.2 E.U./DL
WBC #/AREA URNS AUTO: ABNORMAL /HPF

## 2022-06-03 PROCEDURE — 81001 URINALYSIS AUTO W/SCOPE: CPT

## 2022-06-03 PROCEDURE — 99207 PR NO CHARGE NURSE ONLY: CPT

## 2022-06-03 PROCEDURE — G0009 ADMIN PNEUMOCOCCAL VACCINE: HCPCS

## 2022-06-03 PROCEDURE — 90732 PPSV23 VACC 2 YRS+ SUBQ/IM: CPT

## 2022-06-03 RX ORDER — BNT162B2 0.23 MG/2.25ML
INJECTION, SUSPENSION INTRAMUSCULAR
COMMUNITY
Start: 2022-03-31 | End: 2023-06-29

## 2022-06-03 NOTE — ASSESSMENT & PLAN NOTE
Evaluation showed nonobstructing kidney stones.  Pain is resolved consistent with passed stone.  Recheck urine for resolution of hematuria

## 2022-06-05 NOTE — RESULT ENCOUNTER NOTE
Blood is gone.  With those kidney stones, you need to drink more water.  Your urine should always be clear, and is maximally concentrated on this test   Reyes Hinkle MD

## 2022-06-14 DIAGNOSIS — I67.1 CEREBRAL ANEURYSM: ICD-10-CM

## 2022-06-14 DIAGNOSIS — E78.00 PURE HYPERCHOLESTEROLEMIA: ICD-10-CM

## 2022-06-15 RX ORDER — ATORVASTATIN CALCIUM 20 MG/1
TABLET, FILM COATED ORAL
Qty: 90 TABLET | Refills: 1 | Status: SHIPPED | OUTPATIENT
Start: 2022-06-15 | End: 2022-10-26

## 2022-06-20 ENCOUNTER — HOSPITAL ENCOUNTER (OUTPATIENT)
Dept: CARDIOLOGY | Facility: CLINIC | Age: 67
Discharge: HOME | End: 2022-06-20
Attending: NURSE PRACTITIONER | Admitting: NURSE PRACTITIONER
Payer: COMMERCIAL

## 2022-06-20 DIAGNOSIS — I51.9 LV DYSFUNCTION: ICD-10-CM

## 2022-06-20 LAB — LVEF ECHO: NORMAL

## 2022-06-20 PROCEDURE — 93321 DOPPLER ECHO F-UP/LMTD STD: CPT | Mod: 26 | Performed by: INTERNAL MEDICINE

## 2022-06-20 PROCEDURE — 93321 DOPPLER ECHO F-UP/LMTD STD: CPT

## 2022-06-20 PROCEDURE — 93308 TTE F-UP OR LMTD: CPT | Mod: 26 | Performed by: INTERNAL MEDICINE

## 2022-06-20 PROCEDURE — 93325 DOPPLER ECHO COLOR FLOW MAPG: CPT | Mod: 26 | Performed by: INTERNAL MEDICINE

## 2022-06-20 PROCEDURE — 93325 DOPPLER ECHO COLOR FLOW MAPG: CPT

## 2022-06-30 ENCOUNTER — OFFICE VISIT (OUTPATIENT)
Dept: CARDIOLOGY | Facility: CLINIC | Age: 67
End: 2022-06-30
Attending: NURSE PRACTITIONER
Payer: COMMERCIAL

## 2022-06-30 VITALS
WEIGHT: 183.1 LBS | OXYGEN SATURATION: 98 % | BODY MASS INDEX: 27.75 KG/M2 | DIASTOLIC BLOOD PRESSURE: 70 MMHG | SYSTOLIC BLOOD PRESSURE: 122 MMHG | HEIGHT: 68 IN | HEART RATE: 58 BPM

## 2022-06-30 DIAGNOSIS — I49.3 PVC'S (PREMATURE VENTRICULAR CONTRACTIONS): ICD-10-CM

## 2022-06-30 DIAGNOSIS — I10 ESSENTIAL HYPERTENSION, BENIGN: ICD-10-CM

## 2022-06-30 DIAGNOSIS — I51.9 LV DYSFUNCTION: ICD-10-CM

## 2022-06-30 DIAGNOSIS — I47.29 NSVT (NONSUSTAINED VENTRICULAR TACHYCARDIA) (H): Primary | ICD-10-CM

## 2022-06-30 DIAGNOSIS — E78.00 PURE HYPERCHOLESTEROLEMIA: ICD-10-CM

## 2022-06-30 PROCEDURE — 99214 OFFICE O/P EST MOD 30 MIN: CPT | Performed by: NURSE PRACTITIONER

## 2022-06-30 RX ORDER — METOPROLOL SUCCINATE 25 MG/1
12.5 TABLET, EXTENDED RELEASE ORAL DAILY
Qty: 45 TABLET | Refills: 3 | Status: SHIPPED | OUTPATIENT
Start: 2022-06-30 | End: 2023-07-25

## 2022-06-30 NOTE — PATIENT INSTRUCTIONS
Thanks for participating in a office visit with the [facility] today.    Doing well on a cardiac standpoint  Heart rate stable after initiation of low dose toprol.  24 hr holter monitor to reassess arrhythmia  Reviewed results of echocardiogram, heart function near normal  Limited echo in 1 year  Continue current medical therapy  Encourage exercise, weight loss, and low salt diet    Follow up in 1 year with Dr. Pires with limited echo prior.     Please call my nurse at  [phone] with any questions or concerns.    Scheduling phone number: [phone]  Reminder: Please bring in all current medications, over the counter supplements and vitamin bottles to your next appointment.

## 2022-06-30 NOTE — PROGRESS NOTES
Cardiology Clinic Progress Note  Vladimir Morse MRN# [id number]   YOB: 1955 Age: 66 year old     Reason For Visit:  3 month follow up   Primary Cardiologist:   Dr. Pires          History of Presenting Illness:      Vladimir Morse is a pleasant 66 year old patient who carries a past medical history significant for hypertension,  Hyperlipidemia, LV dysfunction (45%), elevated coronary calcium score with mild non obstructive CAD per angiogram (3/2022).     He was last seen on 3/30/2022 in follow-up to his coronary angiogram.  At that office visit he complained of frequent palpitations and underwent a Zio patch monitor showing 6 runs of VT with the longest 10 beats and SVT runs with the longest 18 beats.  He was initiated on low-dose Toprol 12.5 mg daily.  He subsequently underwent a limited echocardiogram that showed a low normal ejection fraction estimated at 50% (previous 45%), mild global hypokinesis of the LV, normal RV size and function, and no significant valvular disease.    He returns to the office today accompanied by his wife for review of results.  He is feeling well on a cardiac standpoint, denies chest pain, shortness of breath, palpitations, PND, orthopnea, presyncope, or syncope. Upon exam, lungs are clear bilaterally, heart rate and rhythm regular, no palpitations, and trace left ankle edema.    Blood pressure is well controlled at 122/70, heart rate 58 (baseline, asymptomatic).   Last BMP and CBC were unremarkable  Lipid panel is excellent with a total cholesterol of 138, HDL 60, LDL 63, and triglycerides 77.  BMI 27.84, up approximately 5 pounds over the last month due to dietary indiscretions and decreased activity.    Remains compliant with all medications.                   Assessment and Plan:     1.  Nonsustained VT - Zio patch monitor showed  6 runs of VT with the longest 10 beats and SVT runs with the longest 18 beats.  He was initiated on low-dose Toprol 12.5 mg daily which she  is tolerating well.  Recommend 24-hour Holter monitor to reassess arrhythmias.    2.  Elevated coronary calcium score -coronary angiogram showed mild nonobstructive atherosclerosis with a 20% lesion in the proximal RCA and 50% lesion in the high D1.  Continue aspirin and statin.  Recommend risk modification with increased exercise, weight loss, and heart healthy diet.    3.  LV dysfunction - Recent limited echocardiogram that showed a low normal ejection fraction estimated at 50% (previous 45%), mild global hypokinesis of the LV, normal RV size and function, and no significant valvular disease.  Continue current therapy.  Recommend limited echocardiogram in 1 year to reassess LV function.    4.  Hypertension -well controlled  5.  Hyperlipidemia -LDL at goal, continue statin           Thank you for allowing me to participate in this delightful patient's care.   Follow-up in 1 year with Dr. Pires or sooner if needed with limited echo prior.      Neva Montenegro, VERO CNP         Review of Systems:     Review of Systems:  Skin:  not assessed     Eyes:  not assessed glasses  ENT:  not assessed    Respiratory:  Negative    Cardiovascular:  Negative  , Occasional PVCs  Gastroenterology: not assessed    Genitourinary:  not assessed    Musculoskeletal:  not assessed arthritis  Neurologic:  not assessed    Psychiatric:  not assessed    Heme/Lymph/Imm:  not assessed night sweats  Endocrine:  not assessed                Physical Exam:     GEN:  In general, this is a well nourished male in no acute distress.  HEENT:  Pupils equal, round. Sclerae nonicteric. Clear oropharynx. Mucous membranes moist.  NECK: Supple, no masses appreciated. Trachea midline.  No JVD   C/V:  Regular rate and rhythm, no murmur, rub or gallop. No S3 or RV heave.   RESP: Respirations are unlabored. No use of accessory muscles. Clear to auscultation bilaterally without wheezing, rales, or rhonchi.  GI: Abdomen soft, nontender, nondistended. No HSM  appreciated.   EXTREM: Trace left ankle edema. No cyanosis or clubbing.  NEURO: Alert and oriented, cooperative. No obvious focal deficits.   PSYCH: Normal affect.  SKIN: Warm and dry. No rashes or petechiae appreciated.          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     BPPV (benign paroxysmal positional vertigo), unspecified laterality 07/03/2015     Gilbert's syndrome 12/13/2016     Gross hematuria 6/2/2022     Hyperlipidemia LDL goal <100 11/28/2014     Hypertension 02/22/2018     Lipoma of skin and subcutaneous tissue 06/22/2012     Nephrolithiasis 06/22/2012     Primary osteoarthritis of right knee 12/22/2017     Recurrent intestinal obstruction (H) 07/27/2012    Resolved with conservative management, again on 6/27/2014 & 5/24/2021.     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              Past Surgical History:     Past Surgical History:   Procedure Laterality Date     ARTHROSCOPY KNEE Right 10/26/2015    Procedure: ARTHROSCOPY KNEE;  Surgeon: Rodrigue Cole MD;  Location:  OR     AS TOTAL KNEE ARTHROPLASTY Right 03/05/2018     COLONOSCOPY  11/23/2015    Dr. [facility]     COLONOSCOPY N/A 07/13/2021    Procedure: COLONOSCOPY;  Surgeon: Luzmaria Ny MD;  Location:  GI     CV CORONARY ANGIOGRAM N/A 3/21/2022    Procedure: Coronary Angiogram [[id number]];  Surgeon: Oma Panda MD;  Location:  HEART CARDIAC CATH LAB     CYSTOSCOPY, RETROGRADES, INSERT STENT URETER(S), COMBINED  06/29/2012    Procedure:  COMBINED CYSTOSCOPY, RETROGRADES, INSERT STENT URETER(S);  LEFT URETEROSCOPY, LEFT URETERAL STENT PLACEMENT;  Surgeon: Timothy Ortega MD;  Location: [facility] OR     EXCISE LESION BACK Left 09/21/2018    Procedure: EXCISE LESION BACK;  Excision subcutaneous mass and skin lesion left back;  Surgeon: Prosper Loving MD;  Location: [facility] OR. Path: lipoma.     EXTRACORPOREAL SHOCK WAVE LITHOTRIPSY (ESWL)  12/20/2012    Procedure: EXTRACORPOREAL SHOCK WAVE LITHOTRIPSY (ESWL);  LEFT EXTRACORPOREAL SHOCKWAVE LITHOTRIPSY ;  Surgeon: Timothy Ortega MD;  Location: [facility] OR     HC KNEE ARTHROSCOPY, MED+LAT MENISCUS REPAIR Left 01/25/2010    Left knee diagnostic arthroscopy and removal of loose bodies and medial and lateral meniscal debridement.     PERONEAL NERVE DECOMPRESSION Left 02/11/2010    and Excision proximal tibiofibular joint ganglion cyst.     TONSILLECTOMY & ADENOIDECTOMY  1964    T&A as a child     ZZC NONSPECIFIC PROCEDURE  1955    Perforated bowel; infant. Ilio-colonic anastomosis?              Allergies:   No known drug allergies       Data:   All laboratory data reviewed:    LAST CHOLESTEROL:  Lab Results   Component Value Date    CHOL 138 02/09/2022    CHOL 113 10/05/2020     Lab Results   Component Value Date    HDL 60 02/09/2022    HDL 53 10/05/2020     Lab Results   Component Value Date    LDL 63 02/09/2022    LDL 46 10/05/2020     Lab Results   Component Value Date    TRIG 77 02/09/2022    TRIG 70 10/05/2020     Lab Results   Component Value Date    CHOLHDLRATIO 3.6 10/12/2015       LAST BMP:  Lab Results   Component Value Date     03/21/2022     06/03/2021      Lab Results   Component Value Date    POTASSIUM 4.0 03/21/2022    POTASSIUM 4.2 06/03/2021     Lab Results   Component Value Date    CHLORIDE 107 03/21/2022    CHLORIDE 108 06/03/2021     Lab Results   Component Value Date    FREDDY 8.8 03/21/2022    FREDDY 9.1 06/03/2021     Lab Results   Component Value Date    CO2 31 03/21/2022    CO2 28 06/03/2021      Lab Results   Component Value Date    BUN 15 03/21/2022    BUN 11 06/03/2021     Lab Results   Component Value Date    CR 0.77 03/21/2022    CR 0.72 06/03/2021     Lab Results   Component Value Date    GLC 90 03/21/2022    GLC 93 06/03/2021       LAST CBC:  Lab Results   Component Value Date    WBC 6.1 03/21/2022    WBC 5.2 06/03/2021     Lab Results   Component Value Date    RBC 4.92 03/21/2022    RBC 5.17 06/03/2021     Lab Results   Component Value Date    HGB 14.4 03/21/2022    HGB 15.0 06/03/2021     Lab Results   Component Value Date    HCT 44.9 03/21/2022    HCT 47.2 06/03/2021     Lab Results   Component Value Date    MCV 91 03/21/2022    MCV 91 06/03/2021     Lab Results   Component Value Date    MCH 29.3 03/21/2022    MCH 29.0 06/03/2021     Lab Results   Component Value Date    MCHC 32.1 03/21/2022    MCHC 31.8 06/03/2021     Lab Results   Component Value Date    RDW 13.0 03/21/2022    RDW 13.1 06/03/2021     Lab Results   Component Value Date     03/21/2022     06/03/2021

## 2022-06-30 NOTE — LETTER
6/30/2022    Reyes Hinkle MD  [address] [facility] [address]    RE: Vladimir Morse       Dear Colleague,     I had the pleasure of seeing Vladimir Morse in the [facility].  Cardiology Clinic Progress Note  Vladimir Morse MRN# [id number]   YOB: 1955 Age: 66 year old     Reason For Visit:  3 month follow up   Primary Cardiologist:   Dr. Pires          History of Presenting Illness:      Vladimir Morse is a pleasant 66 year old patient who carries a past medical history significant for hypertension,  Hyperlipidemia, LV dysfunction (45%), elevated coronary calcium score with mild non obstructive CAD per angiogram (3/2022).     He was last seen on 3/30/2022 in follow-up to his coronary angiogram.  At that office visit he complained of frequent palpitations and underwent a Zio patch monitor showing 6 runs of VT with the longest 10 beats and SVT runs with the longest 18 beats.  He was initiated on low-dose Toprol 12.5 mg daily.  He subsequently underwent a limited echocardiogram that showed a low normal ejection fraction estimated at 50% (previous 45%), mild global hypokinesis of the LV, normal RV size and function, and no significant valvular disease.    He returns to the office today accompanied by his wife for review of results.  He is feeling well on a cardiac standpoint, denies chest pain, shortness of breath, palpitations, PND, orthopnea, presyncope, or syncope. Upon exam, lungs are clear bilaterally, heart rate and rhythm regular, no palpitations, and trace left ankle edema.    Blood pressure is well controlled at 122/70, heart rate 58 (baseline, asymptomatic).   Last BMP and CBC were unremarkable  Lipid panel is excellent with a total cholesterol of 138, HDL 60, LDL 63, and triglycerides 77.  BMI 27.84, up approximately 5 pounds over the last month due to dietary indiscretions and decreased activity.    Remains compliant with all medications.                    Assessment and Plan:     1.  Nonsustained VT - Zio patch monitor showed  6 runs of VT with the longest 10 beats and SVT runs with the longest 18 beats.  He was initiated on low-dose Toprol 12.5 mg daily which she is tolerating well.  Recommend 24-hour Holter monitor to reassess arrhythmias.    2.  Elevated coronary calcium score -coronary angiogram showed mild nonobstructive atherosclerosis with a 20% lesion in the proximal RCA and 50% lesion in the high D1.  Continue aspirin and statin.  Recommend risk modification with increased exercise, weight loss, and heart healthy diet.    3.  LV dysfunction - Recent limited echocardiogram that showed a low normal ejection fraction estimated at 50% (previous 45%), mild global hypokinesis of the LV, normal RV size and function, and no significant valvular disease.  Continue current therapy.  Recommend limited echocardiogram in 1 year to reassess LV function.    4.  Hypertension -well controlled  5.  Hyperlipidemia -LDL at goal, continue statin           Thank you for allowing me to participate in this delightful patient's care.   Follow-up in 1 year with Dr. Pires or sooner if needed with limited echo prior.      VERO Lux CNP         Review of Systems:     Review of Systems:  Skin:  not assessed     Eyes:  not assessed glasses  ENT:  not assessed    Respiratory:  Negative    Cardiovascular:  Negative  , Occasional PVCs  Gastroenterology: not assessed    Genitourinary:  not assessed    Musculoskeletal:  not assessed arthritis  Neurologic:  not assessed    Psychiatric:  not assessed    Heme/Lymph/Imm:  not assessed night sweats  Endocrine:  not assessed                Physical Exam:     GEN:  In general, this is a well nourished male in no acute distress.  HEENT:  Pupils equal, round. Sclerae nonicteric. Clear oropharynx. Mucous membranes moist.  NECK: Supple, no masses appreciated. Trachea midline.  No JVD   C/V:  Regular rate and rhythm, no murmur, rub or  gallop. No S3 or RV heave.   RESP: Respirations are unlabored. No use of accessory muscles. Clear to auscultation bilaterally without wheezing, rales, or rhonchi.  GI: Abdomen soft, nontender, nondistended. No HSM appreciated.   EXTREM: Trace left ankle edema. No cyanosis or clubbing.  NEURO: Alert and oriented, cooperative. No obvious focal deficits.   PSYCH: Normal affect.  SKIN: Warm and dry. No rashes or petechiae appreciated.          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     BPPV (benign paroxysmal positional vertigo), unspecified laterality 07/03/2015     Gilbert's syndrome 12/13/2016     Gross hematuria 6/2/2022     Hyperlipidemia LDL goal <100 11/28/2014     Hypertension 02/22/2018     Lipoma of skin and subcutaneous tissue 06/22/2012     Nephrolithiasis 06/22/2012     Primary osteoarthritis of right knee 12/22/2017     Recurrent intestinal obstruction (H) 07/27/2012    Resolved with conservative management, again on 6/27/2014 & 5/24/2021.     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              Past Surgical History:     Past Surgical History:   Procedure Laterality Date     ARTHROSCOPY KNEE Right 10/26/2015    Procedure: ARTHROSCOPY KNEE;  Surgeon: Rodrigue Cole MD;  Location:  OR     AS TOTAL KNEE ARTHROPLASTY Right 03/05/2018     COLONOSCOPY  11/23/2015    Dr. Freedman [facility]     COLONOSCOPY N/A 07/13/2021    Procedure: COLONOSCOPY;  Surgeon: Luzmaria Ny MD;  Location:  GI     CV CORONARY ANGIOGRAM N/A  3/21/2022    Procedure: Coronary Angiogram [[id number]];  Surgeon: Oma Panda MD;  Location:  HEART CARDIAC CATH LAB     CYSTOSCOPY, RETROGRADES, INSERT STENT URETER(S), COMBINED  06/29/2012    Procedure: COMBINED CYSTOSCOPY, RETROGRADES, INSERT STENT URETER(S);  LEFT URETEROSCOPY, LEFT URETERAL STENT PLACEMENT;  Surgeon: Timothy Ortega MD;  Location: [facility] OR     EXCISE LESION BACK Left 09/21/2018    Procedure: EXCISE LESION BACK;  Excision subcutaneous mass and skin lesion left back;  Surgeon: Prosper Loving MD;  Location:  OR. Path: lipoma.     EXTRACORPOREAL SHOCK WAVE LITHOTRIPSY (ESWL)  12/20/2012    Procedure: EXTRACORPOREAL SHOCK WAVE LITHOTRIPSY (ESWL);  LEFT EXTRACORPOREAL SHOCKWAVE LITHOTRIPSY ;  Surgeon: Timothy Ortega MD;  Location:  OR     HC KNEE ARTHROSCOPY, MED+LAT MENISCUS REPAIR Left 01/25/2010    Left knee diagnostic arthroscopy and removal of loose bodies and medial and lateral meniscal debridement.     PERONEAL NERVE DECOMPRESSION Left 02/11/2010    and Excision proximal tibiofibular joint ganglion cyst.     TONSILLECTOMY & ADENOIDECTOMY  1964    T&A as a child     ZZC NONSPECIFIC PROCEDURE  1955    Perforated bowel; infant. Ilio-colonic anastomosis?              Allergies:   No known drug allergies       Data:   All laboratory data reviewed:    LAST CHOLESTEROL:  Lab Results   Component Value Date    CHOL 138 02/09/2022    CHOL 113 10/05/2020     Lab Results   Component Value Date    HDL 60 02/09/2022    HDL 53 10/05/2020     Lab Results   Component Value Date    LDL 63 02/09/2022    LDL 46 10/05/2020     Lab Results   Component Value Date    TRIG 77 02/09/2022    TRIG 70 10/05/2020     Lab Results   Component Value Date    CHOLHDLRATIO 3.6 10/12/2015       LAST BMP:  Lab Results   Component Value Date     03/21/2022     06/03/2021      Lab Results   Component Value Date    POTASSIUM 4.0 03/21/2022    POTASSIUM 4.2 06/03/2021     Lab Results   Component Value Date     CHLORIDE 107 03/21/2022    CHLORIDE 108 06/03/2021     Lab Results   Component Value Date    FREDDY 8.8 03/21/2022    FREDDY 9.1 06/03/2021     Lab Results   Component Value Date    CO2 31 03/21/2022    CO2 28 06/03/2021     Lab Results   Component Value Date    BUN 15 03/21/2022    BUN 11 06/03/2021     Lab Results   Component Value Date    CR 0.77 03/21/2022    CR 0.72 06/03/2021     Lab Results   Component Value Date    GLC 90 03/21/2022    GLC 93 06/03/2021       LAST CBC:  Lab Results   Component Value Date    WBC 6.1 03/21/2022    WBC 5.2 06/03/2021     Lab Results   Component Value Date    RBC 4.92 03/21/2022    RBC 5.17 06/03/2021     Lab Results   Component Value Date    HGB 14.4 03/21/2022    HGB 15.0 06/03/2021     Lab Results   Component Value Date    HCT 44.9 03/21/2022    HCT 47.2 06/03/2021     Lab Results   Component Value Date    MCV 91 03/21/2022    MCV 91 06/03/2021     Lab Results   Component Value Date    MCH 29.3 03/21/2022    MCH 29.0 06/03/2021     Lab Results   Component Value Date    MCHC 32.1 03/21/2022    MCHC 31.8 06/03/2021     Lab Results   Component Value Date    RDW 13.0 03/21/2022    RDW 13.1 06/03/2021     Lab Results   Component Value Date     03/21/2022     06/03/2021       Thank you for allowing me to participate in the care of your patient.      Sincerely,     VERO uLx CNP     [facility]  cc:   VERO Lux CNP  [address] [place] [address]

## 2022-07-14 ENCOUNTER — HOSPITAL ENCOUNTER (OUTPATIENT)
Dept: CARDIOLOGY | Facility: CLINIC | Age: 67
Discharge: HOME | End: 2022-07-14
Attending: NURSE PRACTITIONER | Admitting: NURSE PRACTITIONER
Payer: COMMERCIAL

## 2022-07-14 DIAGNOSIS — I47.29 NSVT (NONSUSTAINED VENTRICULAR TACHYCARDIA) (H): ICD-10-CM

## 2022-07-14 DIAGNOSIS — I49.3 PVC'S (PREMATURE VENTRICULAR CONTRACTIONS): ICD-10-CM

## 2022-07-14 PROCEDURE — 93225 XTRNL ECG REC<48 HRS REC: CPT

## 2022-07-14 PROCEDURE — 93227 XTRNL ECG REC<48 HR R&I: CPT | Performed by: INTERNAL MEDICINE

## 2022-10-12 ENCOUNTER — MYC MEDICAL ADVICE (OUTPATIENT)
Dept: FAMILY MEDICINE | Facility: CLINIC | Age: 67
End: 2022-10-12

## 2022-10-22 ENCOUNTER — HEALTH MAINTENANCE LETTER (OUTPATIENT)
Age: 67
End: 2022-10-22

## 2022-10-26 ENCOUNTER — TELEPHONE (OUTPATIENT)
Dept: FAMILY MEDICINE | Facility: CLINIC | Age: 67
End: 2022-10-26

## 2022-10-26 DIAGNOSIS — I67.1 CEREBRAL ANEURYSM: ICD-10-CM

## 2022-10-26 DIAGNOSIS — E78.00 PURE HYPERCHOLESTEROLEMIA: ICD-10-CM

## 2022-10-26 RX ORDER — ATORVASTATIN CALCIUM 20 MG/1
20 TABLET, FILM COATED ORAL DAILY
Qty: 90 TABLET | Refills: 1 | Status: SHIPPED | OUTPATIENT
Start: 2022-10-26 | End: 2023-04-24

## 2022-10-26 NOTE — TELEPHONE ENCOUNTER
Pharmacist from [facility] calls, trying to get atorvastatin refill, previous pharmacy does not have refills on file, someone faxed note back to them from our clinic, no note in chart, refills sent, they will inform pt that he is due for annual lipids/visit in Feb 2023  Prescription approved per [facility] Refill Protocol.      Recent Labs   Lab Test 02/09/22  0952 10/05/20  0914 12/12/16  1129 10/12/15  1808 11/28/14  1148   CHOL 138 113   < > 166 150   HDL 60 53   < > 46 51   LDL 63 46   < > 95 85   TRIG 77 70   < > 125 71   CHOLHDLRATIO  --   --   --  3.6 2.9    < > = values in this interval not displayed.     Lab Results   Component Value Date    AST 21 02/09/2022    AST 32 05/24/2021     Lab Results   Component Value Date    ALT 44 02/09/2022    ALT 39 05/24/2021     Bess Rodriguez RN, BSN  [facility]

## 2023-04-01 ENCOUNTER — HEALTH MAINTENANCE LETTER (OUTPATIENT)
Age: 68
End: 2023-04-01

## 2023-04-20 DIAGNOSIS — E78.00 PURE HYPERCHOLESTEROLEMIA: ICD-10-CM

## 2023-04-20 DIAGNOSIS — I67.1 CEREBRAL ANEURYSM: ICD-10-CM

## 2023-04-24 ENCOUNTER — ANCILLARY PROCEDURE (OUTPATIENT)
Dept: GENERAL RADIOLOGY | Facility: CLINIC | Age: 68
End: 2023-04-24
Attending: PHYSICIAN ASSISTANT
Payer: COMMERCIAL

## 2023-04-24 ENCOUNTER — OFFICE VISIT (OUTPATIENT)
Dept: URGENT CARE | Facility: URGENT CARE | Age: 68
End: 2023-04-24
Payer: COMMERCIAL

## 2023-04-24 VITALS
HEART RATE: 85 BPM | TEMPERATURE: 98 F | WEIGHT: 183 LBS | OXYGEN SATURATION: 97 % | SYSTOLIC BLOOD PRESSURE: 118 MMHG | RESPIRATION RATE: 14 BRPM | DIASTOLIC BLOOD PRESSURE: 83 MMHG | BODY MASS INDEX: 27.74 KG/M2 | HEIGHT: 68 IN

## 2023-04-24 DIAGNOSIS — Z20.822 EXPOSURE TO 2019 NOVEL CORONAVIRUS: Primary | ICD-10-CM

## 2023-04-24 DIAGNOSIS — J01.90 ACUTE NON-RECURRENT SINUSITIS, UNSPECIFIED LOCATION: ICD-10-CM

## 2023-04-24 LAB
BASOPHILS # BLD AUTO: 0 10E3/UL (ref 0–0.2)
BASOPHILS NFR BLD AUTO: 0 %
EOSINOPHIL # BLD AUTO: 0.1 10E3/UL (ref 0–0.7)
EOSINOPHIL NFR BLD AUTO: 1 %
ERYTHROCYTE [DISTWIDTH] IN BLOOD BY AUTOMATED COUNT: 12.7 % (ref 10–15)
HCT VFR BLD AUTO: 48.6 % (ref 40–53)
HGB BLD-MCNC: 15.6 G/DL (ref 13.3–17.7)
IMM GRANULOCYTES # BLD: 0 10E3/UL
IMM GRANULOCYTES NFR BLD: 0 %
LYMPHOCYTES # BLD AUTO: 1.4 10E3/UL (ref 0.8–5.3)
LYMPHOCYTES NFR BLD AUTO: 10 %
MCH RBC QN AUTO: 28.6 PG (ref 26.5–33)
MCHC RBC AUTO-ENTMCNC: 32.1 G/DL (ref 31.5–36.5)
MCV RBC AUTO: 89 FL (ref 78–100)
MONOCYTES # BLD AUTO: 0.9 10E3/UL (ref 0–1.3)
MONOCYTES NFR BLD AUTO: 7 %
NEUTROPHILS # BLD AUTO: 11.1 10E3/UL (ref 1.6–8.3)
NEUTROPHILS NFR BLD AUTO: 82 %
PLATELET # BLD AUTO: 281 10E3/UL (ref 150–450)
RBC # BLD AUTO: 5.45 10E6/UL (ref 4.4–5.9)
WBC # BLD AUTO: 13.5 10E3/UL (ref 4–11)

## 2023-04-24 PROCEDURE — 36415 COLL VENOUS BLD VENIPUNCTURE: CPT | Performed by: PHYSICIAN ASSISTANT

## 2023-04-24 PROCEDURE — U0005 INFEC AGEN DETEC AMPLI PROBE: HCPCS | Performed by: PHYSICIAN ASSISTANT

## 2023-04-24 PROCEDURE — U0003 INFECTIOUS AGENT DETECTION BY NUCLEIC ACID (DNA OR RNA); SEVERE ACUTE RESPIRATORY SYNDROME CORONAVIRUS 2 (SARS-COV-2) (CORONAVIRUS DISEASE [COVID-19]), AMPLIFIED PROBE TECHNIQUE, MAKING USE OF HIGH THROUGHPUT TECHNOLOGIES AS DESCRIBED BY CMS-2020-01-R: HCPCS | Performed by: PHYSICIAN ASSISTANT

## 2023-04-24 PROCEDURE — 99214 OFFICE O/P EST MOD 30 MIN: CPT | Mod: CS | Performed by: PHYSICIAN ASSISTANT

## 2023-04-24 PROCEDURE — 70220 X-RAY EXAM OF SINUSES: CPT | Mod: TC | Performed by: RADIOLOGY

## 2023-04-24 PROCEDURE — 85025 COMPLETE CBC W/AUTO DIFF WBC: CPT | Performed by: PHYSICIAN ASSISTANT

## 2023-04-24 RX ORDER — ATORVASTATIN CALCIUM 20 MG/1
20 TABLET, FILM COATED ORAL DAILY
Qty: 90 TABLET | Refills: 0 | Status: SHIPPED | OUTPATIENT
Start: 2023-04-24 | End: 2023-06-29

## 2023-04-24 RX ORDER — FLUTICASONE PROPIONATE 50 MCG
1 SPRAY, SUSPENSION (ML) NASAL DAILY
Qty: 9.9 ML | Refills: 0 | Status: SHIPPED | OUTPATIENT
Start: 2023-04-24 | End: 2024-07-18

## 2023-04-24 RX ORDER — GUAIFENESIN 600 MG/1
1200 TABLET, EXTENDED RELEASE ORAL 2 TIMES DAILY
Qty: 25 TABLET | Refills: 0 | Status: SHIPPED | OUTPATIENT
Start: 2023-04-24 | End: 2023-06-29

## 2023-04-24 ASSESSMENT — ENCOUNTER SYMPTOMS
SHORTNESS OF BREATH: 0
ABDOMINAL PAIN: 0
FEVER: 0
COUGH: 1
HEADACHES: 1
EYE DISCHARGE: 0
EYE PAIN: 1
RHINORRHEA: 1
SORE THROAT: 1

## 2023-04-24 NOTE — TELEPHONE ENCOUNTER
Medication is being filled for 1 time refill only due to:  Patient needs to be seen because due for an appt and an fasting labs.  This is for atorvastatin.  Pt has upcoming appt scheduled and last ldl within normal limits.      Appointments in Next Year    Apr 24, 2023 10:30 AM  SHORT with Nga Redmond  [facility] ([facility] ) [phone]   Apr 24, 2023 11:50 AM  (Arrive by 11:35 AM)  XR SINUS COMPLETE G/E 3 VIEWS with [email or web address]  [facility] ([facility] ) [phone]   Jun 08, 2023  1:30 PM  Echo Limited with [email or web address]  [facility] ([facility] ) [phone]   Jun 29, 2023  1:30 PM  (Arrive by 1:10 PM)  Annual Wellness Visit with Reyes Hinkle MD  [facility] ([facility] ) [phone]   Jul 25, 2023  2:15 PM  (Arrive by 2:10 PM)  Return Cardiology with Rodrigue Pires MD  [facility] ([facility] ) [phone]             Alar Island Pedicle Flap Text: The defect edges were debeveled with a #15 scalpel blade.  Given the location of the defect, shape of the defect and the proximity to the alar rim an island pedicle advancement flap was deemed most appropriate.  Using a sterile surgical marker, an appropriate advancement flap was drawn incorporating the defect, outlining the appropriate donor tissue and placing the expected incisions within the nasal ala running parallel to the alar rim. The area thus outlined was incised with a #15 scalpel blade.  The skin margins were undermined minimally to an appropriate distance in all directions around the primary defect and laterally outward around the island pedicle utilizing iris scissors.  There was minimal undermining beneath the pedicle flap.

## 2023-04-24 NOTE — PROGRESS NOTES
SUBJECTIVE:   Vladimir Morse is a 67 year old male presenting with a chief complaint of   Chief Complaint   Patient presents with     Sinus Problem     Left eye is in a lot of pain      Nasal Congestion     Runny nose, phlegm      Cough       He is an established patient of [facility].  Patient presents with productive cough (yellow) and nasal congestion x 3 days.  No fevers, No hx of covid and did not test at home.   Per patient left eye causing pain.      Treatment:  Sudafed and robitussin  HEENT:  No surgeries.      Review of Systems   Constitutional: Negative for fever.   HENT: Positive for congestion, rhinorrhea and sore throat. Negative for ear pain.    Eyes: Positive for pain. Negative for discharge and visual disturbance.   Respiratory: Positive for cough. Negative for shortness of breath.    Cardiovascular: Negative for chest pain.   Gastrointestinal: Negative for abdominal pain.   Neurological: Positive for headaches.   All other systems reviewed and are negative.      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     BPPV (benign paroxysmal positional vertigo), unspecified laterality 07/03/2015     Gilbert's syndrome 12/13/2016     Gross hematuria 6/2/2022     Hyperlipidemia LDL goal <100 11/28/2014     Hypertension 02/22/2018     Lipoma of skin and subcutaneous tissue 06/22/2012     Nephrolithiasis 06/22/2012     Primary osteoarthritis of right knee 12/22/2017     Recurrent intestinal obstruction (H) 07/27/2012    Resolved with conservative management, again on 6/27/2014 & 5/24/2021.     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     Family History   Problem Relation Age of Onset     Breast Cancer Mother 55     Colon Cancer Father      Myocardial Infarction Father      Skin Cancer Father      Cerebrovascular Disease Brother      Other - See Comments Brother         quadriplegia from an accident     Lung Cancer Brother 66        was a smokder.     Arthritis Brother      No Known Problems Brother      Breast Cancer Sister 65     Nephrolithiasis Son      No Known Problems Son      Other - See Comments Son         hip impingement syndrome.     No Known Problems Daughter      Current Outpatient Medications   Medication Sig Dispense Refill     amoxicillin-clavulanate (AUGMENTIN) 875-125 MG tablet Take 1 tablet by mouth 2 times daily for 7 days 14 tablet 0     aspirin 81 MG EC tablet Take 81 mg by mouth daily       fluticasone (FLONASE) 50 MCG/ACT nasal spray Spray 1 spray into both nostrils daily 9.9 mL 0     guaiFENesin (MUCINEX) 600 MG 12 hr tablet Take 2 tablets (1,200 mg) by mouth 2 times daily 25 tablet 0     metoprolol succinate ER (TOPROL XL) 25 MG 24 hr tablet Take 0.5 tablets (12.5 mg) by mouth daily 45 tablet 3     ramipril (ALTACE) 10 MG capsule TAKE ONE CAPSULE BY MOUTH DAILY 90 capsule 0     atorvastatin (LIPITOR) 20 MG tablet TAKE 1 TABLET (20 MG) BY MOUTH DAILY 90 tablet 0     [employer] COVID-19 VAC-DELIO 30 MCG/0.3ML injection  (Patient not taking: Reported on 4/24/2023)       Social History     Tobacco Use     Smoking status: Never     Smokeless tobacco: Never   Vaping Use     Vaping status: Never Used   Substance Use Topics     Alcohol use: Yes     Comment: rarely       OBJECTIVE  /83 (BP Location: Right arm, Patient Position: Sitting, Cuff Size: Adult Large)   Pulse 85   Temp 98  F (36.7  C) (Oral)   Resp 14   Ht 1.727 m (5' 8\")   Wt 83 kg (183 lb)   SpO2 97%   BMI 27.83 kg/m      Physical Exam    Labs:  Results for orders placed or performed in " visit on 04/24/23 (from the past 24 hour(s))   CBC with platelets and differential    Narrative    The following orders were created for panel order CBC with platelets and differential.  Procedure                               Abnormality         Status                     ---------                               -----------         ------                     CBC with platelets and d...[[id number]]  Abnormal            Final result                 Please view results for these tests on the individual orders.   CBC with platelets and differential   Result Value Ref Range    WBC Count 13.5 (H) 4.0 - 11.0 10e3/uL    RBC Count 5.45 4.40 - 5.90 10e6/uL    Hemoglobin 15.6 13.3 - 17.7 g/dL    Hematocrit 48.6 40.0 - 53.0 %    MCV 89 78 - 100 fL    MCH 28.6 26.5 - 33.0 pg    MCHC 32.1 31.5 - 36.5 g/dL    RDW 12.7 10.0 - 15.0 %    Platelet Count 281 150 - 450 10e3/uL    % Neutrophils 82 %    % Lymphocytes 10 %    % Monocytes 7 %    % Eosinophils 1 %    % Basophils 0 %    % Immature Granulocytes 0 %    Absolute Neutrophils 11.1 (H) 1.6 - 8.3 10e3/uL    Absolute Lymphocytes 1.4 0.8 - 5.3 10e3/uL    Absolute Monocytes 0.9 0.0 - 1.3 10e3/uL    Absolute Eosinophils 0.1 0.0 - 0.7 10e3/uL    Absolute Basophils 0.0 0.0 - 0.2 10e3/uL    Absolute Immature Granulocytes 0.0 <=0.4 10e3/uL   XR Sinus Complete G/E 3 Views    Narrative    XR SINUS COMPLETE THREE OR MORE VIEWS   4/24/2023 12:04 PM     HISTORY: Acute non-recurrent sinusitis, unspecified location    COMPARISON: Head CT 6/1/2011.       Impression    IMPRESSION: Left maxillary sinus appears to be opacified possibly also  with an air-fluid layer. This could represent sinusitis although it  would be better assessed with sinus CT.        X-Ray was done, my findings are:     ASSESSMENT:      ICD-10-CM    1. Exposure to 2019 novel coronavirus  Z20.822 Symptomatic COVID-19 Virus (Coronavirus) by PCR Nose      2. Acute non-recurrent sinusitis, unspecified location  J01.90 XR Sinus  Complete G/E 3 Views     CBC with platelets and differential     CBC with platelets and differential     amoxicillin-clavulanate (AUGMENTIN) 875-125 MG tablet     guaiFENesin (MUCINEX) 600 MG 12 hr tablet     fluticasone (FLONASE) 50 MCG/ACT nasal spray           Medical Decision Making:    Differential Diagnosis:  URI Adult/Peds:  Sinusitis and covid    Serious Comorbid Conditions:  Adult:  reviewed    PLAN:    rx for augmentin, mucinex and flonase.  Patient education.  Drink plenty of water.  Discussed expected course.      Followup:    If not improving or if condition worsens, follow up with your Primary Care Provider, If not improving or if conditions worsens over the next 12-24 hours, go to the Emergency Department    There are no Patient Instructions on file for this visit.

## 2023-04-25 LAB — SARS-COV-2 RNA RESP QL NAA+PROBE: NEGATIVE

## 2023-04-28 ENCOUNTER — E-VISIT (OUTPATIENT)
Dept: URGENT CARE | Facility: CLINIC | Age: 68
End: 2023-04-28
Payer: COMMERCIAL

## 2023-04-28 ENCOUNTER — MYC MEDICAL ADVICE (OUTPATIENT)
Dept: FAMILY MEDICINE | Facility: CLINIC | Age: 68
End: 2023-04-28
Payer: COMMERCIAL

## 2023-04-28 DIAGNOSIS — J06.9 VIRAL URI WITH COUGH: Primary | ICD-10-CM

## 2023-04-28 PROCEDURE — 99421 OL DIG E/M SVC 5-10 MIN: CPT | Performed by: NURSE PRACTITIONER

## 2023-04-28 RX ORDER — BENZONATATE 100 MG/1
100 CAPSULE ORAL 3 TIMES DAILY PRN
Qty: 30 CAPSULE | Refills: 0 | Status: SHIPPED | OUTPATIENT
Start: 2023-04-28 | End: 2023-06-29

## 2023-04-28 NOTE — PATIENT INSTRUCTIONS
"  Dear Vladimir Morse    After reviewing your responses, I've been able to diagnose you with \"Bronchitis\" which is a common infection of your lungs that is nearly always caused by a virus. The virus causes swelling and irritation of the air passages of your lungs which leads to cough. The illness spreads from your nose and throat to your windpipe and airways. It is often called a \"chest cold\" and can last up to 2 weeks, but is not a serious illness. Exposure to cigarette smoke usually makes this significantly worse.      To treat bronchitis, the main thing to do is drink lots of fluids and rest. Cough medications over-the-counter such as mucinex, robitussin or \"cold and sinus\" medications can be helpful. Ibuprofen and Tylenol also help with fevers or aching feelings that you often have with this kind of illness. Do not take ibuprofen if you have kidney disease, stomach ulcers or allergy to aspirin.     Bronchitis is most often highly contagious as viruses are spread through the air or touch. Avoid contact with others who may become infected, particularly children, the elderly and those whose immune systems might be weak.     If your symptoms worsen, you develop chest pain or shortness of breath, fevers over 101, or are not improving in 5 days, please contact your primary care provider for an appointment or visit any of our convenient Walk-in Care or Urgent Care Centers to be seen which can be found on our website here.    Thanks again for choosing us as your health care partner,    VERO Adams CNP  "

## 2023-05-18 ENCOUNTER — ANCILLARY PROCEDURE (OUTPATIENT)
Dept: GENERAL RADIOLOGY | Facility: CLINIC | Age: 68
End: 2023-05-18
Attending: FAMILY MEDICINE
Payer: COMMERCIAL

## 2023-05-18 ENCOUNTER — MYC MEDICAL ADVICE (OUTPATIENT)
Dept: FAMILY MEDICINE | Facility: CLINIC | Age: 68
End: 2023-05-18

## 2023-05-18 ENCOUNTER — OFFICE VISIT (OUTPATIENT)
Dept: FAMILY MEDICINE | Facility: CLINIC | Age: 68
End: 2023-05-18
Payer: COMMERCIAL

## 2023-05-18 VITALS
TEMPERATURE: 97.6 F | DIASTOLIC BLOOD PRESSURE: 82 MMHG | HEIGHT: 68 IN | RESPIRATION RATE: 18 BRPM | SYSTOLIC BLOOD PRESSURE: 132 MMHG | HEART RATE: 69 BPM | OXYGEN SATURATION: 98 % | WEIGHT: 192.2 LBS | BODY MASS INDEX: 29.13 KG/M2

## 2023-05-18 DIAGNOSIS — I47.29 NSVT (NONSUSTAINED VENTRICULAR TACHYCARDIA) (H): ICD-10-CM

## 2023-05-18 DIAGNOSIS — R05.9 COUGH: ICD-10-CM

## 2023-05-18 DIAGNOSIS — I42.8 OTHER CARDIOMYOPATHY (H): ICD-10-CM

## 2023-05-18 DIAGNOSIS — I10 ESSENTIAL HYPERTENSION, BENIGN: ICD-10-CM

## 2023-05-18 DIAGNOSIS — R05.2 SUBACUTE COUGH: Primary | ICD-10-CM

## 2023-05-18 PROCEDURE — 99213 OFFICE O/P EST LOW 20 MIN: CPT | Performed by: FAMILY MEDICINE

## 2023-05-18 PROCEDURE — 71046 X-RAY EXAM CHEST 2 VIEWS: CPT | Mod: TC | Performed by: RADIOLOGY

## 2023-05-18 NOTE — TELEPHONE ENCOUNTER
See [facility] message request, routed to bronze TC, please call pt and schedule f/u appointment with BW or SAME DAY PROVIDER (appointments available tomorrow)  Bess Rodriguez, RN, BSN  [facility]

## 2023-05-18 NOTE — PROGRESS NOTES
"  Assessment & Plan   Problem List Items Addressed This Visit     Cough - Primary     Treated for sinus infection with opacified sinus radiographically, HEENT symptoms have resolved.  Still with cough.  No fevers no chest pain.  Exam reassuring.  Recommend cough suppression at this point         Relevant Orders    XR Chest 2 Views    Essential hypertension, benign     Controlled.  Check twice yearly.  Continue current regimen         NSVT (nonsustained ventricular tachycardia) (H)     Asymptomatic with current therapies.  Cardiology appointment early June         Other cardiomyopathy (H)     Slightly decreased EF at cardiology evaluation previously.  Cardiology appointment early June.  Asymptomatic                      BMI:   Estimated body mass index is 29.22 kg/m  as calculated from the following:    Height as of this encounter: 1.727 m (5' 8\").    Weight as of this encounter: 87.2 kg (192 lb 3.2 oz).   Weight management plan: maintain        Reyes Hinkle MD  [facility] KATTY Gilbert is a 67 year old, presenting for the following health issues:  UC Follow-Up        5/18/2023     4:51 PM   Additional Questions   Roomed by Ana Malhotra   Accompanied by YONATHAN     History of Present Illness       Reason for visit:  Cougj    He eats 2-3 servings of fruits and vegetables daily.He consumes 1 sweetened beverage(s) daily.He exercises with enough effort to increase his heart rate 20 to 29 minutes per day.  He exercises with enough effort to increase his heart rate 4 days per week.   He is taking medications regularly.       ED/UC Followup:    Facility:  [facility]  Date of visit: 04/28/2023  Reason for visit: Cough   Current Status: Pt states that the cough has not really gotten better. States that cough starts first thing in the morning and lasts throughout the day. Pt states that it is a dry cough. Denies any other symptoms. Has been using cough drops and " "cough syrup.           Review of Systems   No heartburn no chest pain      Objective    /82 (BP Location: Right arm, Patient Position: Sitting, Cuff Size: Adult Regular)   Pulse 69   Temp 97.6  F (36.4  C) (Oral)   Resp 18   Ht 1.727 m (5' 8\")   Wt 87.2 kg (192 lb 3.2 oz)   SpO2 98%   BMI 29.22 kg/m    Body mass index is 29.22 kg/m .  Physical Exam   Posterior pharyngeal lymphoid streaks  Clear chest    CXR w/o infiltrate          Reyes Hinkle MD              "

## 2023-05-19 PROBLEM — D47.2 MONOCLONAL GAMMOPATHY: Status: RESOLVED | Noted: 2022-02-10 | Resolved: 2023-05-19

## 2023-05-19 PROBLEM — C79.51 SECONDARY MALIGNANT NEOPLASM OF BONE (H): Status: ACTIVE | Noted: 2023-05-19

## 2023-05-19 PROBLEM — C79.51 SECONDARY MALIGNANT NEOPLASM OF BONE (H): Status: RESOLVED | Noted: 2023-05-19 | Resolved: 2023-05-19

## 2023-05-19 PROBLEM — I47.29 NSVT (NONSUSTAINED VENTRICULAR TACHYCARDIA) (H): Status: ACTIVE | Noted: 2023-05-19

## 2023-05-19 PROBLEM — I42.8 OTHER CARDIOMYOPATHY (H): Status: ACTIVE | Noted: 2023-05-19

## 2023-05-19 NOTE — ASSESSMENT & PLAN NOTE
Slightly decreased EF at cardiology evaluation previously.  Cardiology appointment early June.  Asymptomatic

## 2023-05-19 NOTE — ASSESSMENT & PLAN NOTE
Treated for sinus infection with opacified sinus radiographically, HEENT symptoms have resolved.  Still with cough.  No fevers no chest pain.  Exam reassuring.  Recommend cough suppression at this point

## 2023-06-08 ENCOUNTER — HOSPITAL ENCOUNTER (OUTPATIENT)
Dept: CARDIOLOGY | Facility: CLINIC | Age: 68
Discharge: HOME | End: 2023-06-08
Attending: NURSE PRACTITIONER | Admitting: NURSE PRACTITIONER
Payer: COMMERCIAL

## 2023-06-08 DIAGNOSIS — I51.9 LV DYSFUNCTION: ICD-10-CM

## 2023-06-08 LAB — LVEF ECHO: NORMAL

## 2023-06-08 PROCEDURE — 93321 DOPPLER ECHO F-UP/LMTD STD: CPT | Mod: 26 | Performed by: INTERNAL MEDICINE

## 2023-06-08 PROCEDURE — 93308 TTE F-UP OR LMTD: CPT | Mod: 26 | Performed by: INTERNAL MEDICINE

## 2023-06-08 PROCEDURE — 93308 TTE F-UP OR LMTD: CPT

## 2023-06-08 PROCEDURE — 93325 DOPPLER ECHO COLOR FLOW MAPG: CPT

## 2023-06-08 PROCEDURE — 93325 DOPPLER ECHO COLOR FLOW MAPG: CPT | Mod: 26 | Performed by: INTERNAL MEDICINE

## 2023-06-28 SDOH — ECONOMIC STABILITY: FOOD INSECURITY: WITHIN THE PAST 12 MONTHS, YOU WORRIED THAT YOUR FOOD WOULD RUN OUT BEFORE YOU GOT MONEY TO BUY MORE.: NEVER TRUE

## 2023-06-28 SDOH — ECONOMIC STABILITY: TRANSPORTATION INSECURITY
IN THE PAST 12 MONTHS, HAS LACK OF TRANSPORTATION KEPT YOU FROM MEETINGS, WORK, OR FROM GETTING THINGS NEEDED FOR DAILY LIVING?: PATIENT DECLINED

## 2023-06-28 SDOH — ECONOMIC STABILITY: INCOME INSECURITY: IN THE LAST 12 MONTHS, WAS THERE A TIME WHEN YOU WERE NOT ABLE TO PAY THE MORTGAGE OR RENT ON TIME?: PATIENT REFUSED

## 2023-06-28 SDOH — ECONOMIC STABILITY: INCOME INSECURITY: HOW HARD IS IT FOR YOU TO PAY FOR THE VERY BASICS LIKE FOOD, HOUSING, MEDICAL CARE, AND HEATING?: NOT HARD AT ALL

## 2023-06-28 SDOH — ECONOMIC STABILITY: FOOD INSECURITY: WITHIN THE PAST 12 MONTHS, THE FOOD YOU BOUGHT JUST DIDN'T LAST AND YOU DIDN'T HAVE MONEY TO GET MORE.: NEVER TRUE

## 2023-06-28 SDOH — HEALTH STABILITY: PHYSICAL HEALTH: ON AVERAGE, HOW MANY DAYS PER WEEK DO YOU ENGAGE IN MODERATE TO STRENUOUS EXERCISE (LIKE A BRISK WALK)?: 0 DAYS

## 2023-06-28 SDOH — HEALTH STABILITY: PHYSICAL HEALTH: ON AVERAGE, HOW MANY MINUTES DO YOU ENGAGE IN EXERCISE AT THIS LEVEL?: 0 MIN

## 2023-06-28 ASSESSMENT — ENCOUNTER SYMPTOMS
HEMATOCHEZIA: 0
PALPITATIONS: 0
CHILLS: 0
PARESTHESIAS: 0
CONSTIPATION: 0
WEAKNESS: 0
JOINT SWELLING: 0
HEMATURIA: 0
HEARTBURN: 0
HEADACHES: 0
MYALGIAS: 0
FEVER: 0
NAUSEA: 0
COUGH: 0
SHORTNESS OF BREATH: 0
SORE THROAT: 0
ABDOMINAL PAIN: 0
FREQUENCY: 0
DIZZINESS: 0
DYSURIA: 0
EYE PAIN: 0
NERVOUS/ANXIOUS: 0
ARTHRALGIAS: 1
DIARRHEA: 0

## 2023-06-28 ASSESSMENT — LIFESTYLE VARIABLES
AUDIT-C TOTAL SCORE: 1
HOW MANY STANDARD DRINKS CONTAINING ALCOHOL DO YOU HAVE ON A TYPICAL DAY: 1 OR 2
SKIP TO QUESTIONS 9-10: 1
HOW OFTEN DO YOU HAVE A DRINK CONTAINING ALCOHOL: MONTHLY OR LESS
HOW OFTEN DO YOU HAVE SIX OR MORE DRINKS ON ONE OCCASION: NEVER

## 2023-06-28 ASSESSMENT — SOCIAL DETERMINANTS OF HEALTH (SDOH)
HOW OFTEN DO YOU ATTEND CHURCH OR RELIGIOUS SERVICES?: PATIENT DECLINED
IN A TYPICAL WEEK, HOW MANY TIMES DO YOU TALK ON THE PHONE WITH FAMILY, FRIENDS, OR NEIGHBORS?: ONCE A WEEK
DO YOU BELONG TO ANY CLUBS OR ORGANIZATIONS SUCH AS CHURCH GROUPS UNIONS, FRATERNAL OR ATHLETIC GROUPS, OR SCHOOL GROUPS?: NO
HOW OFTEN DO YOU GET TOGETHER WITH FRIENDS OR RELATIVES?: ONCE A WEEK

## 2023-06-28 ASSESSMENT — ACTIVITIES OF DAILY LIVING (ADL): CURRENT_FUNCTION: NO ASSISTANCE NEEDED

## 2023-06-29 ENCOUNTER — OFFICE VISIT (OUTPATIENT)
Dept: FAMILY MEDICINE | Facility: CLINIC | Age: 68
End: 2023-06-29
Payer: COMMERCIAL

## 2023-06-29 VITALS
WEIGHT: 191.3 LBS | RESPIRATION RATE: 16 BRPM | DIASTOLIC BLOOD PRESSURE: 79 MMHG | TEMPERATURE: 97.5 F | HEIGHT: 68 IN | OXYGEN SATURATION: 99 % | SYSTOLIC BLOOD PRESSURE: 128 MMHG | BODY MASS INDEX: 28.99 KG/M2 | HEART RATE: 61 BPM

## 2023-06-29 DIAGNOSIS — Z23 NEED FOR DIPHTHERIA-TETANUS-PERTUSSIS (TDAP) VACCINE: ICD-10-CM

## 2023-06-29 DIAGNOSIS — I10 ESSENTIAL HYPERTENSION, BENIGN: Primary | ICD-10-CM

## 2023-06-29 DIAGNOSIS — H91.13 PRESBYCUSIS OF BOTH EARS: ICD-10-CM

## 2023-06-29 DIAGNOSIS — Z23 HIGH PRIORITY FOR 2019-NCOV VACCINE: ICD-10-CM

## 2023-06-29 DIAGNOSIS — I67.1 CEREBRAL ANEURYSM: ICD-10-CM

## 2023-06-29 DIAGNOSIS — Z00.00 ENCOUNTER FOR MEDICARE ANNUAL WELLNESS EXAM: ICD-10-CM

## 2023-06-29 DIAGNOSIS — H61.23 BILATERAL IMPACTED CERUMEN: ICD-10-CM

## 2023-06-29 DIAGNOSIS — E78.00 PURE HYPERCHOLESTEROLEMIA: ICD-10-CM

## 2023-06-29 DIAGNOSIS — R93.1 ELEVATED CORONARY ARTERY CALCIUM SCORE: ICD-10-CM

## 2023-06-29 DIAGNOSIS — Z23 NEED FOR SECOND BOOSTER DOSE OF COVID-19 VACCINE: ICD-10-CM

## 2023-06-29 PROBLEM — R63.4 WEIGHT LOSS: Status: RESOLVED | Noted: 2022-02-09 | Resolved: 2023-06-29

## 2023-06-29 PROBLEM — R10.32 ABDOMINAL PAIN, LEFT LOWER QUADRANT: Status: RESOLVED | Noted: 2022-06-02 | Resolved: 2023-06-29

## 2023-06-29 LAB
ALBUMIN SERPL BCG-MCNC: 4.6 G/DL (ref 3.5–5.2)
ALP SERPL-CCNC: 102 U/L (ref 40–129)
ALT SERPL W P-5'-P-CCNC: 25 U/L (ref 0–70)
ANION GAP SERPL CALCULATED.3IONS-SCNC: 11 MMOL/L (ref 7–15)
AST SERPL W P-5'-P-CCNC: 24 U/L (ref 0–45)
BILIRUB SERPL-MCNC: 2.5 MG/DL
BUN SERPL-MCNC: 18.6 MG/DL (ref 8–23)
CALCIUM SERPL-MCNC: 9.5 MG/DL (ref 8.8–10.2)
CHLORIDE SERPL-SCNC: 105 MMOL/L (ref 98–107)
CHOLEST SERPL-MCNC: 143 MG/DL
CREAT SERPL-MCNC: 0.76 MG/DL (ref 0.67–1.17)
DEPRECATED HCO3 PLAS-SCNC: 26 MMOL/L (ref 22–29)
GFR SERPL CREATININE-BSD FRML MDRD: >90 ML/MIN/1.73M2
GLUCOSE SERPL-MCNC: 88 MG/DL (ref 70–99)
HDLC SERPL-MCNC: 45 MG/DL
LDLC SERPL CALC-MCNC: 85 MG/DL
NONHDLC SERPL-MCNC: 98 MG/DL
POTASSIUM SERPL-SCNC: 4.4 MMOL/L (ref 3.4–5.3)
PROT SERPL-MCNC: 7.7 G/DL (ref 6.4–8.3)
SODIUM SERPL-SCNC: 142 MMOL/L (ref 136–145)
TRIGL SERPL-MCNC: 65 MG/DL

## 2023-06-29 PROCEDURE — 90677 PCV20 VACCINE IM: CPT | Performed by: FAMILY MEDICINE

## 2023-06-29 PROCEDURE — G0439 PPPS, SUBSEQ VISIT: HCPCS | Performed by: FAMILY MEDICINE

## 2023-06-29 PROCEDURE — 36415 COLL VENOUS BLD VENIPUNCTURE: CPT | Performed by: FAMILY MEDICINE

## 2023-06-29 PROCEDURE — G0009 ADMIN PNEUMOCOCCAL VACCINE: HCPCS | Mod: 59 | Performed by: FAMILY MEDICINE

## 2023-06-29 PROCEDURE — 69209 REMOVE IMPACTED EAR WAX UNI: CPT | Performed by: FAMILY MEDICINE

## 2023-06-29 PROCEDURE — 80053 COMPREHEN METABOLIC PANEL: CPT | Performed by: FAMILY MEDICINE

## 2023-06-29 PROCEDURE — 91312 COVID-19 BIVALENT 12+ (PFIZER): CPT | Performed by: FAMILY MEDICINE

## 2023-06-29 PROCEDURE — 0124A COVID-19 BIVALENT 12+ (PFIZER): CPT | Performed by: FAMILY MEDICINE

## 2023-06-29 PROCEDURE — 80061 LIPID PANEL: CPT | Performed by: FAMILY MEDICINE

## 2023-06-29 RX ORDER — ATORVASTATIN CALCIUM 20 MG/1
20 TABLET, FILM COATED ORAL DAILY
Qty: 90 TABLET | Refills: 3 | Status: SHIPPED | OUTPATIENT
Start: 2023-06-29 | End: 2024-09-23

## 2023-06-29 RX ORDER — RAMIPRIL 10 MG/1
10 CAPSULE ORAL DAILY
Qty: 90 CAPSULE | Refills: 3 | Status: SHIPPED | OUTPATIENT
Start: 2023-06-29 | End: 2024-09-23

## 2023-06-29 ASSESSMENT — ENCOUNTER SYMPTOMS
EYE PAIN: 0
DIZZINESS: 0
PARESTHESIAS: 0
COUGH: 0
HEMATURIA: 0
ABDOMINAL PAIN: 0
JOINT SWELLING: 0
HEADACHES: 0
FREQUENCY: 0
WEAKNESS: 0
PALPITATIONS: 0
DYSURIA: 0
SHORTNESS OF BREATH: 0
CONSTIPATION: 0
MYALGIAS: 0
SORE THROAT: 0
CHILLS: 0
NAUSEA: 0
FEVER: 0
ARTHRALGIAS: 1
DIARRHEA: 0
HEMATOCHEZIA: 0
NERVOUS/ANXIOUS: 0
HEARTBURN: 0

## 2023-06-29 ASSESSMENT — ACTIVITIES OF DAILY LIVING (ADL): CURRENT_FUNCTION: NO ASSISTANCE NEEDED

## 2023-06-29 NOTE — PROGRESS NOTES
Patient identified using two patient identifiers.  Ear exam showing wax occlusion completed by provider.  Solution: warm water was placed in the both ear(s) via irrigation tool: elephant ear.    Gudelia Vargas [facility]

## 2023-06-29 NOTE — PROGRESS NOTES
"SUBJECTIVE:   Vladimir is a 67 year old who presents for Preventive Visit.      5/18/2023     4:51 PM   Additional Questions   Roomed by Ana Malhotra   Accompanied by YONATHAN     Are you in the first 12 months of your Medicare coverage?  No    Healthy Habits:     In general, how would you rate your overall health?  Good    Frequency of exercise:  2-3 days/week    Duration of exercise:  30-45 minutes    Do you usually eat at least 4 servings of fruit and vegetables a day, include whole grains    & fiber and avoid regularly eating high fat or \"junk\" foods?  No    Taking medications regularly:  Yes    Medication side effects:  None    Ability to successfully perform activities of daily living:  No assistance needed    Home Safety:  No safety concerns identified    Hearing Impairment:  Difficulty following a conversation in a noisy restaurant or crowded room, feel that people are mumbling or not speaking clearly, difficulty following dialogue in the theater, difficult to understand a speaker at a public meeting or [employer] service, need to ask people to speak up or repeat themselves, difficulty understanding soft or whispered speech and difficulty understanding speech on the telephone    In the past 6 months, have you been bothered by leaking of urine?  No    In general, how would you rate your overall mental or emotional health?  Excellent    Additional concerns today:  No        Have you ever done Advance Care Planning? (For example, a Health Directive, POLST, or a discussion with a medical provider or your loved ones about your wishes): No, advance care planning information given to patient to review.  Patient declined advance care planning discussion at this time.      Fall risk  Fallen 2 or more times in the past year?: No  Any fall with injury in the past year?: No    Cognitive Screening   1) Repeat 3 items (Leader, Season, Table)    2) Clock draw: NORMAL  3) 3 item recall: Recalls 1 object   Results: NORMAL clock, " 1-2 items recalled: COGNITIVE IMPAIRMENT LESS LIKELY    Mini-CogTM Copyright JACK Graham. Licensed by the author for use in [facility]; reprinted with permission (maggie@[facility]). All rights reserved.          Reviewed and updated as needed this visit by clinical staff   Tobacco  Allergies  Meds              Reviewed and updated as needed this visit by Provider                 Social History     Tobacco Use     Smoking status: Never     Smokeless tobacco: Never   Substance Use Topics     Alcohol use: Yes     Comment: rarely             6/28/2023     2:21 PM   Alcohol Use   Prescreen: >3 drinks/day or >7 drinks/week? No     Do you have a current opioid prescription? No  Do you use any other controlled substances or medications that are not prescribed by a provider? None              Current providers sharing in care for this patient include:   Patient Care Team:  Reyes Hinkle MD as PCP - General (Family Practice)  Reyes Hinkle MD as Assigned PCP  Celine Fraser MD as Assigned Cancer Care Provider  Neva Montenegro APRN CNP as Assigned Heart and Vascular Provider  Rodrigue Pires MD as MD (Cardiovascular Disease)    The following health maintenance items are reviewed in Epic and correct as of today:  Health Maintenance   Topic Date Due     DTAP/TDAP/TD IMMUNIZATION (3 - Td or Tdap) 09/27/2022     COVID-19 Vaccine (7 - Pfizer series) 01/22/2023     MEDICARE ANNUAL WELLNESS VISIT  02/09/2023     CMP  02/09/2023     LIPID  02/09/2023     Pneumococcal Vaccine: 65+ Years (3 - PCV) 06/03/2023     PSA  02/09/2024     ANNUAL REVIEW OF HM ORDERS  05/18/2024     FALL RISK ASSESSMENT  06/29/2024     COLORECTAL CANCER SCREENING  07/13/2026     ADVANCE CARE PLANNING  02/09/2027     HEPATITIS C SCREENING  Completed     PHQ-2 (once per calendar year)  Completed     INFLUENZA VACCINE  Completed     ZOSTER IMMUNIZATION  Completed     IPV IMMUNIZATION  Aged Out     MENINGITIS IMMUNIZATION  Aged Out     AORTIC  "ANEURYSM SCREENING (SYSTEM ASSIGNED)  Discontinued               Review of Systems   Constitutional: Negative for chills and fever.   HENT: Positive for hearing loss. Negative for congestion, ear pain and sore throat.    Eyes: Negative for pain and visual disturbance.   Respiratory: Negative for cough and shortness of breath.         Cough has resolved   Cardiovascular: Negative for chest pain, palpitations and peripheral edema.   Gastrointestinal: Negative for abdominal pain, constipation, diarrhea, heartburn, hematochezia and nausea.   Genitourinary: Positive for impotence. Negative for dysuria, frequency, genital sores, hematuria, penile discharge and urgency.   Musculoskeletal: Positive for arthralgias. Negative for joint swelling and myalgias.   Skin: Negative for rash.   Neurological: Negative for dizziness, weakness, headaches and paresthesias.   Psychiatric/Behavioral: Negative for mood changes. The patient is not nervous/anxious.          OBJECTIVE:   /79 (BP Location: Right arm, Patient Position: Sitting, Cuff Size: Adult Regular)   Pulse 61   Temp 97.5  F (36.4  C) (Oral)   Resp 16   Ht 1.727 m (5' 8\")   Wt 86.8 kg (191 lb 4.8 oz)   SpO2 99%   BMI 29.09 kg/m   Estimated body mass index is 29.09 kg/m  as calculated from the following:    Height as of this encounter: 1.727 m (5' 8\").    Weight as of this encounter: 86.8 kg (191 lb 4.8 oz).  Physical Exam  Constitutional:       Appearance: Normal appearance.   HENT:      Head: Atraumatic.      Right Ear: Tympanic membrane normal. There is impacted cerumen.      Left Ear: Tympanic membrane normal. There is impacted cerumen.      Ears:      Comments: Clear post lavage     Nose: Nose normal.      Mouth/Throat:      Mouth: Mucous membranes are moist.   Eyes:      Pupils: Pupils are equal, round, and reactive to light.   Cardiovascular:      Rate and Rhythm: Normal rate and regular rhythm.      Heart sounds: Normal heart sounds.   Pulmonary:      " Effort: Pulmonary effort is normal.      Breath sounds: Normal breath sounds.   Abdominal:      General: Bowel sounds are normal.      Palpations: Abdomen is soft.   Musculoskeletal:      Cervical back: Neck supple.   Skin:     General: Skin is warm and dry.   Neurological:      General: No focal deficit present.      Mental Status: He is alert.   Psychiatric:         Mood and Affect: Mood normal.               ASSESSMENT / PLAN:     Problem List Items Addressed This Visit     Presbycusis    Need for second booster dose of COVID-19 vaccine     Offered         Relevant Orders    COVID-19 BIVALENT 12+ (PFIZER) (Completed)    Need for diphtheria-tetanus-pertussis (Tdap) vaccine     Recommended at pharmacy         Hyperlipidemia     Statin therapy.  PCP prescribed.  Cardiology follows         Relevant Medications    atorvastatin (LIPITOR) 20 MG tablet    High priority for 2019-nCoV vaccine     Offered         Essential hypertension, benign - Primary     Controlled.  Continue.  Check twice yearly         Relevant Medications    ramipril (ALTACE) 10 MG capsule    Other Relevant Orders    COMPREHENSIVE METABOLIC PANEL    Comprehensive metabolic panel (BMP + Alb, Alk Phos, ALT, AST, Total. Bili, TP) (Completed)    Encounter for Medicare annual wellness exam    Elevated coronary artery calcium score     86%tile. Cardiology following         Cerebral aneurysm     [facility].ASA ramipril         Relevant Medications    atorvastatin (LIPITOR) 20 MG tablet    Bilateral impacted cerumen     Clear post lavage            Patient has been advised of split billing requirements and indicates understanding: Yes      COUNSELING:  Reviewed preventive health counseling, as reflected in patient instructions        He reports that he has never smoked. He has never used smokeless tobacco.      Appropriate preventive services were discussed with this patient, including applicable screening as appropriate for cardiovascular  disease, diabetes, osteopenia/osteoporosis, and glaucoma.  As appropriate for age/gender, discussed screening for colorectal cancer, prostate cancer, breast cancer, and cervical cancer. Checklist reviewing preventive services available has been given to the patient.    Reviewed patients plan of care and provided an AVS. The Basic Care Plan (routine screening as documented in Health Maintenance) for Vladimir meets the Care Plan requirement. This Care Plan has been established and reviewed with the Patient.      Reyes Hinkle MD  [facility]    Identified Health Risks:    I have reviewed Opioid Use Disorder and Substance Use Disorder risk factors and made any needed referrals.

## 2023-06-29 NOTE — PROGRESS NOTES
"    The patient was counseled and encouraged to consider modifying their diet and eating habits. He was provided with information on recommended healthy diet options.  The patient w,me  as provided with written information regarding signs of hearing loss.  Answers for HPI/ROS submitted by the patient on 6/28/2023  In general, how would you rate your overall physical health?: good  Frequency of exercise:: 2-3 days/week  Do you usually eat at least 4 servings of fruit and vegetables a day, include whole grains & fiber, and avoid regularly eating high fat or \"junk\" foods? : No  Taking medications regularly:: Yes  Medication side effects:: None  Activities of Daily Living: no assistance needed  Home safety: no safety concerns identified  Hearing Impairment:: difficulty following a conversation in a noisy restaurant or crowded room, feel that people are mumbling or not speaking clearly, difficulty following dialogue in the theater, difficult to understand a speaker at a public meeting or [employer] service, need to ask people to speak up or repeat themselves, difficulty understanding soft or whispered speech, difficulty understanding speech on the telephone  In the past 6 months, have you been bothered by leaking of urine?: No  abdominal pain: No  Blood in stool: No  Blood in urine: No  chest pain: No  chills: No  congestion: No  constipation: No  cough: No  diarrhea: No  dizziness: No  ear pain: No  eye pain: No  nervous/anxious: No  fever: No  frequency: No  genital sores: No  headaches: No  hearing loss: Yes  heartburn: No  arthralgias: Yes  joint swelling: No  peripheral edema: No  mood changes: No  myalgias: No  nausea: No  dysuria: No  palpitations: No  Skin sensation changes: No  sore throat: No  urgency: No  rash: No  shortness of breath: No  visual disturbance: No  weakness: No  impotence: Yes  penile discharge: No  In general, how would you rate your overall mental or emotional health?: excellent  Additional " concerns today:: No  Duration of exercise:: 30-45 minutes

## 2023-06-29 NOTE — NURSING NOTE
Prior to immunization administration, verified patients identity using patient s name and date of birth. Please see Immunization Activity for additional information.     Screening Questionnaire for Adult Immunization    Are you sick today?   No   Do you have allergies to medications, food, a vaccine component or latex?   No   Have you ever had a serious reaction after receiving a vaccination?   No   Do you have a long-term health problem with heart, lung, kidney, or metabolic disease (e.g., diabetes), asthma, a blood disorder, no spleen, complement component deficiency, a cochlear implant, or a spinal fluid leak?  Are you on long-term aspirin therapy?   No   Do you have cancer, leukemia, HIV/AIDS, or any other immune system problem?   No   Do you have a parent, brother, or sister with an immune system problem?   No   In the past 3 months, have you taken medications that affect  your immune system, such as prednisone, other steroids, or anticancer drugs; drugs for the treatment of rheumatoid arthritis, Crohn s disease, or psoriasis; or have you had radiation treatments?   No   Have you had a seizure, or a brain or other nervous system problem?   No   During the past year, have you received a transfusion of blood or blood    products, or been given immune (gamma) globulin or antiviral drug?   No   For women: Are you pregnant or is there a chance you could become       pregnant during the next month?   No   Have you received any vaccinations in the past 4 weeks?   No     Immunization questionnaire answers were all negative.  Patient receiving PCV20      Patient instructed to remain in clinic for 15 minutes afterwards, and to report any adverse reactions.     Screening performed by Greg Clifton MA on 6/29/2023 at 2:01 PM.

## 2023-06-29 NOTE — PATIENT INSTRUCTIONS
Patient Education   Personalized Prevention Plan  You are due for the preventive services outlined below.  Your care team is available to assist you in scheduling these services.  If you have already completed any of these items, please share that information with your care team to update in your medical record.  Health Maintenance Due   Topic Date Due    Diptheria Tetanus Pertussis (DTAP/TDAP/TD) Vaccine (3 - Td or Tdap) 09/27/2022    COVID-19 Vaccine (7 - Pfizer series) 01/22/2023    Annual Wellness Visit  02/09/2023    Comprehensive Metabolic Panel  02/09/2023    Cholesterol Lab  02/09/2023    Pneumococcal Vaccine (3 - PCV) 06/03/2023       Understanding USDA MyPlate  The USDA has guidelines to help you make healthy food choices. These are called MyPlate. MyPlate shows the food groups that make up healthy meals using the image of a place setting. Before you eat, think about the healthiest choices for what to put on your plate or in your cup or bowl. To learn more about building a healthy plate, visit www.choosemyplate.gov.     The food groups  Fruits. Any fruit or 100% fruit juice counts as part of the Fruit Group. Fruits may be fresh, canned, frozen, or dried, and may be whole, cut-up, or pureed. Make 1/2 of your plate fruits and vegetables.  Vegetables. Any vegetable or 100% vegetable juice counts as a member of the Vegetable Group. Vegetables may be fresh, frozen, canned, or dried. They can be served raw or cooked and may be whole, cut-up, or mashed. Make 1/2 of your plate fruits and vegetables.  Grains. All foods made from grains are part of the Grains Group. These include wheat, rice, oats, cornmeal, and barley. Grains are often used to make foods such as bread, pasta, oatmeal, cereal, tortillas, and grits. Grains should be no more than 1/4 of your plate. At least half of your grains should be whole grains.  Protein. This group includes meat, poultry, seafood, beans and peas, eggs, processed soy products  (such as tofu), nuts (including nut butters), and seeds. Make protein choices no more than 1/4 of your plate. Meat and poultry choices should be lean or low fat.  Dairy. The Dairy Group includes all fluid milk products and foods made from milk that contain calcium, such as yogurt and cheese. (Foods that have little calcium, such as cream, butter, and cream cheese, are not part of this group.) Most dairy choices should be low-fat or fat-free.  Oils. Oils aren't a food group, but they do contain essential nutrients. However it's important to watch your intake of oils. These are fats that are liquid at room temperature. They include canola, corn, olive, soybean, vegetable, and sunflower oil. Foods that are mainly oil include mayonnaise, certain salad dressings, and soft margarines. You likely already get your daily oil allowance from the foods you eat.  Things to limit  Eating healthy also means limiting these things in your diet:  Salt (sodium). Many processed foods have a lot of sodium. To keep sodium intake down, eat fresh vegetables, meats, poultry, and seafood when possible. Purchase low-sodium, reduced-sodium, or no-salt-added food products at the store. And don't add salt to your meals at home. Instead, season them with herbs and spices such as dill, oregano, cumin, and paprika. Or try adding flavor with lemon or lime zest and juice.  Saturated fat. Saturated fats are most often found in animal products such as beef, pork, and chicken. They are often solid at room temperature, such as butter. To reduce your saturated fat intake, choose leaner cuts of meat and poultry. And try healthier cooking methods such as grilling, broiling, roasting, or baking. For a simple lower-fat swap, use plain nonfat yogurt instead of mayonnaise when making potato salad or macaroni salad.  Added sugars. These are sugars added to foods. They are in foods such as ice cream, candy, soda, fruit drinks, sports drinks, energy drinks,  cookies, pastries, jams, and syrups. Cut down on added sugars by sharing sweet treats with a family member or friend. You can also choose fruit for dessert, and drink water or other unsweetened beverages.  [employer] last reviewed this educational content on 6/1/2020 2000-2022 [employer]. All rights reserved. This information is not intended as a substitute for professional medical care. Always follow your healthcare professional's instructions.          Signs of Hearing Loss  Hearing loss is a problem shared by many people. In fact, it's one of the most common health problems, particularly as people age. Most people aged 65 and older have some hearing loss. By age 80, almost everyone does. Hearing loss often occurs slowly over the years. So, you may not realize your hearing has gotten worse.   When sudden hearing loss occurs, it's important to contact your healthcare provider right away. Your provider will do a medical exam and a hearing exam as soon as possible. This is to help find the cause and type of your sudden hearing loss. Based on your diagnosis, your healthcare provider will discuss possible treatments.      Hearing much better with one ear can be a sign of hearing loss.     Have your hearing checked  Call your healthcare provider if you:   Have to strain to hear normal conversation  Have to watch other people s faces very carefully to follow what they re saying  Need to ask people to repeat what they ve said  Often misunderstand what people are saying  Turn the volume of the television or radio up so high that others complain  Feel that people are mumbling when they re talking to you  Find that the effort to hear leaves you feeling tired and irritated  Notice, when using the phone, that you hear better with one ear than the other  [employer] last reviewed this educational content on 6/1/2022 2000-2022 [employer]. All rights reserved. This information is not intended as a  substitute for professional medical care. Always follow your healthcare professional's instructions.      [facility]et[facility] shot pharmacy

## 2023-06-30 PROBLEM — Z23 NEED FOR SECOND BOOSTER DOSE OF COVID-19 VACCINE: Status: ACTIVE | Noted: 2023-06-30

## 2023-06-30 PROBLEM — Z00.00 ENCOUNTER FOR MEDICARE ANNUAL WELLNESS EXAM: Status: ACTIVE | Noted: 2023-06-30

## 2023-06-30 PROBLEM — Z23 NEED FOR DIPHTHERIA-TETANUS-PERTUSSIS (TDAP) VACCINE: Status: ACTIVE | Noted: 2023-06-30

## 2023-06-30 PROBLEM — H61.23 BILATERAL IMPACTED CERUMEN: Status: ACTIVE | Noted: 2023-06-30

## 2023-06-30 PROBLEM — R05.9 COUGH: Status: RESOLVED | Noted: 2023-05-18 | Resolved: 2023-06-30

## 2023-06-30 PROBLEM — Z23 HIGH PRIORITY FOR 2019-NCOV VACCINE: Status: ACTIVE | Noted: 2023-06-30

## 2023-06-30 PROBLEM — R93.1 ELEVATED CORONARY ARTERY CALCIUM SCORE: Status: ACTIVE | Noted: 2023-06-30

## 2023-06-30 NOTE — RESULT ENCOUNTER NOTE
All tests look real good. 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 It will cause no harm. An enzyme lack in your liver  Reyes Hinkle MD

## 2023-07-25 ENCOUNTER — OFFICE VISIT (OUTPATIENT)
Dept: CARDIOLOGY | Facility: CLINIC | Age: 68
End: 2023-07-25
Payer: COMMERCIAL

## 2023-07-25 VITALS
DIASTOLIC BLOOD PRESSURE: 64 MMHG | WEIGHT: 191 LBS | SYSTOLIC BLOOD PRESSURE: 122 MMHG | HEART RATE: 58 BPM | BODY MASS INDEX: 28.95 KG/M2 | OXYGEN SATURATION: 97 % | HEIGHT: 68 IN

## 2023-07-25 DIAGNOSIS — I49.3 PVC'S (PREMATURE VENTRICULAR CONTRACTIONS): ICD-10-CM

## 2023-07-25 DIAGNOSIS — I47.29 NSVT (NONSUSTAINED VENTRICULAR TACHYCARDIA) (H): Primary | ICD-10-CM

## 2023-07-25 DIAGNOSIS — I42.8 OTHER CARDIOMYOPATHY (H): ICD-10-CM

## 2023-07-25 PROCEDURE — 99214 OFFICE O/P EST MOD 30 MIN: CPT | Performed by: INTERNAL MEDICINE

## 2023-07-25 PROCEDURE — 93000 ELECTROCARDIOGRAM COMPLETE: CPT | Performed by: INTERNAL MEDICINE

## 2023-07-25 RX ORDER — METOPROLOL SUCCINATE 25 MG/1
12.5 TABLET, EXTENDED RELEASE ORAL DAILY
Qty: 45 TABLET | Refills: 3 | Status: SHIPPED | OUTPATIENT
Start: 2023-07-25 | End: 2024-09-23

## 2023-07-25 NOTE — PROGRESS NOTES
HISTORY:    Vladimir Morse is a pleasant 67-year-old male accompanied by his wife today.  He has a history of treated hypertension and hyperlipidemia.  He was first seen virtually in 2002 after experiencing an episode of chest discomfort.  A nuclear stress test was read as abnormal and echocardiogram at that time suggested an EF of 45% (the nuclear scan had read an EF of 52% at rest).  A calcium scoring exam showed a calcium score of 719 placing him at the 86 percentile for age and gender and a coronary angiogram showed mild nonobstructive atherosclerosis with the tightest lesion of 50% in a high diagonal.  Vladimir is here today for a routine follow-up visit.    Today Vladimir reports that he feels that he is doing very well.  He remains physically active and does not experience any limitations.  He denies exertional chest arm neck shoulder or jaw discomfort as well as symptoms of palpitations, PND/orthopnea, syncope or near syncope, peripheral edema, or claudication.    A recent echocardiogram is reviewed with him and shows an ejection fraction estimated to be 50 to 55% with normal diastolic function and no significant valvular abnormalities.  It was essentially a normal echocardiogram with low normal ejection fraction.  The patient continues to use ACE inhibitor and beta-blocker for both blood pressure control and because of his borderline reduced ejection fraction.  The current echo was directly compared to the previous echo and the ejection fraction is unchanged.      ASSESSMENT/PLAN:    1.  Mild cardiomyopathy versus low normal ejection fraction.  His ejection fraction remains stable and he is on ACE inhibitor and beta-blocker.  His ejection fraction is now essentially normal and we will continue his current medications indefinitely.  Of note, although his EF is borderline low his diastolic function remains normal.  2.  Hyperlipidemia.  Patient is using Lipitor 20 mg daily with his most recent LDL little higher  than on previous studies.  3.  Hypertension well-controlled.      Thank you for inviting me to participate in the care of your patient.  Please don't hesitate to call if I can be of further assistance.  30 minutes were spent today reviewing the chart and other records, interviewing and examining the patient, and documenting our visit.  No medication changes are made today.  A 2-year follow-up with echocardiogram will be planned but I would be happy to see him sooner should the need arise.    Chart documentation was completed, in part, with [employer] voice-recognition software. Even though reviewed, some grammatical, spelling, and word errors may remain.       Orders Placed This Encounter   Procedures    Follow-Up with Cardiologist    EKG 12-lead complete w/read - Clinics (performed today)     Orders Placed This Encounter   Medications    metoprolol succinate ER (TOPROL XL) 25 MG 24 hr tablet     Sig: Take 0.5 tablets (12.5 mg) by mouth daily     Dispense:  45 tablet     Refill:  3     Medications Discontinued During This Encounter   Medication Reason    metoprolol succinate ER (TOPROL XL) 25 MG 24 hr tablet Reorder (No AVS)       10 year ASCVD risk: The 10-year ASCVD risk score (Radha WANG, et al., 2019) is: 13.6%    Values used to calculate the score:      Age: 67 years      Sex: Male      Is Non- : No      Diabetic: No      Tobacco smoker: No      Systolic Blood Pressure: 122 mmHg      Is BP treated: Yes      HDL Cholesterol: 45 mg/dL      Total Cholesterol: 143 mg/dL    Encounter Diagnoses   Name Primary?    NSVT (nonsustained ventricular tachycardia) (H) Yes    PVC's (premature ventricular contractions)     Other cardiomyopathy (H)        CURRENT MEDICATIONS:  Current Outpatient Medications   Medication Sig Dispense Refill    aspirin 81 MG EC tablet Take 81 mg by mouth daily      atorvastatin (LIPITOR) 20 MG tablet Take 1 tablet (20 mg) by mouth daily 90 tablet 3    fluticasone (FLONASE) 50  MCG/ACT nasal spray Spray 1 spray into both nostrils daily (Patient taking differently: Spray 1 spray into both nostrils as needed) 9.9 mL 0    metoprolol succinate ER (TOPROL XL) 25 MG 24 hr tablet Take 0.5 tablets (12.5 mg) by mouth daily 45 tablet 3    ramipril (ALTACE) 10 MG capsule Take 1 capsule (10 mg) by mouth daily 90 capsule 3       ALLERGIES     Allergies   Allergen Reactions    No Known Drug Allergy        PAST MEDICAL HISTORY:  Past Medical History:   Diagnosis Date    Abdominal pain, left lower quadrant 06/02/2022    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    BPPV (benign paroxysmal positional vertigo), unspecified laterality 07/03/2015    Cerebral infarction (H)     Elevated coronary artery calcium score 6/30/2023    Gilbert's syndrome 12/13/2016    Gross hematuria 06/02/2022    Hyperlipidemia LDL goal <100 11/28/2014    Hypertension 02/22/2018    Lipoma of skin and subcutaneous tissue 06/22/2012    Nephrolithiasis 06/22/2012    Primary osteoarthritis of right knee 12/22/2017    Recurrent intestinal obstruction (H) 07/27/2012    Resolved with conservative management, again on 6/27/2014 & 5/24/2021.    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       PAST SURGICAL HISTORY:  Past Surgical History:   Procedure Laterality Date    ABDOMEN SURGERY  8/2/1956    Perforated bowel    ARTHROSCOPY KNEE Right 10/26/2015    Procedure: ARTHROSCOPY KNEE;  Surgeon: Rodrigue Cole MD;  Location: [facility] OR    AS TOTAL KNEE ARTHROPLASTY Right 03/05/2018    COLONOSCOPY  11/23/2015    Dr. Freedman [facility]    COLONOSCOPY N/A 07/13/2021    Procedure: COLONOSCOPY;  Surgeon:  Luzmaria Ny MD;  Location:  GI    CV CORONARY ANGIOGRAM N/A 03/21/2022    Procedure: Coronary Angiogram [[id number]];  Surgeon: Oma Panda MD;  Location:  [facility] CATH LAB    CYSTOSCOPY, RETROGRADES, INSERT STENT URETER(S), COMBINED  06/29/2012    Procedure: COMBINED CYSTOSCOPY, RETROGRADES, INSERT STENT URETER(S);  LEFT URETEROSCOPY, LEFT URETERAL STENT PLACEMENT;  Surgeon: Timothy Ortega MD;  Location:  OR    EXCISE LESION BACK Left 09/21/2018    Procedure: EXCISE LESION BACK;  Excision subcutaneous mass and skin lesion left back;  Surgeon: Prosper Loving MD;  Location:  OR. Path: lipoma.    EXTRACORPOREAL SHOCK WAVE LITHOTRIPSY (ESWL)  12/20/2012    Procedure: EXTRACORPOREAL SHOCK WAVE LITHOTRIPSY (ESWL);  LEFT EXTRACORPOREAL SHOCKWAVE LITHOTRIPSY ;  Surgeon: Timothy Ortega MD;  Location:  OR    EYE SURGERY      Cataracts    HC KNEE ARTHROSCOPY, MED+LAT MENISCUS REPAIR Left 01/25/2010    Left knee diagnostic arthroscopy and removal of loose bodies and medial and lateral meniscal debridement.    PERONEAL NERVE DECOMPRESSION Left 02/11/2010    and Excision proximal tibiofibular joint ganglion cyst.    TONSILLECTOMY & ADENOIDECTOMY  1964    T&A as a child    Z NONSPECIFIC PROCEDURE  1955    Perforated bowel; infant. Ilio-colonic anastomosis?       FAMILY HISTORY:  Family History   Problem Relation Age of Onset    Breast Cancer Mother     Colon Cancer Father     Myocardial Infarction Father     Skin Cancer Father     Cerebrovascular Disease Brother     Other - See Comments Brother         quadriplegia from an accident    Lung Cancer Brother 66        was a smokder.    Arthritis Brother     No Known Problems Brother     Breast Cancer Sister     Nephrolithiasis Son     No Known Problems Son     Other - See Comments Son         hip impingement syndrome.    No Known Problems Daughter        SOCIAL HISTORY:  Social History     Socioeconomic History    Marital status:      Spouse  name: None    Number of children: None    Years of education: None    Highest education level: None   Occupational History    Occupation: Retired. Was a   for [place] [employer].   Tobacco Use    Smoking status: Never    Smokeless tobacco: Never   Vaping Use    Vaping Use: Never used   Substance and Sexual Activity    Alcohol use: Yes     Comment: rarely    Drug use: No    Sexual activity: Yes     Partners: Female     Birth control/protection: None   Other Topics Concern    Parent/sibling w/ CABG, MI or angioplasty before 65F 55M? No     Social Determinants of Health     Financial Resource Strain: Low Risk  (6/28/2023)    Overall Financial Resource Strain (CARDIA)     Difficulty of Paying Living Expenses: Not hard at all   Food Insecurity: No Food Insecurity (6/28/2023)    Hunger Vital Sign     Worried About Running Out of Food in the Last Year: Never true     Ran Out of Food in the Last Year: Never true   Transportation Needs: Unknown (6/28/2023)    PRAPARE - Transportation     Lack of Transportation (Medical): No     Lack of Transportation (Non-Medical): Patient refused   Physical Activity: Inactive (6/28/2023)    Exercise Vital Sign     Days of Exercise per Week: 0 days     Minutes of Exercise per Session: 0 min   Stress: No Stress Concern Present (6/28/2023)    [employer] [employer] - Occupational Stress Questionnaire     Feeling of Stress : Not at all   Social Connections: Unknown (6/28/2023)    Social Connection and Isolation Panel [NHANES]     Frequency of Communication with Friends and Family: Once a week     Frequency of Social Gatherings with Friends and Family: Once a week     Attends Mandaen Services: Patient refused     Active Member of Clubs or Organizations: No     Marital Status:    Housing Stability: Unknown (6/28/2023)    Housing Stability Vital Sign     Unable to Pay for Housing in the Last Year: Patient refused     Number of Places Lived in the  "Last Year: 1       Review of Systems:  Skin:        Eyes:       ENT:       Respiratory:  Negative    Cardiovascular:  Negative    Gastroenterology:      Genitourinary:       Musculoskeletal:       Neurologic:       Psychiatric:       Heme/Lymph/Imm:       Endocrine:         Physical Exam:  Vitals: /64 (BP Location: Right arm, Patient Position: Sitting, Cuff Size: Adult Regular)   Pulse 58   Ht 1.727 m (5' 8\")   Wt 86.6 kg (191 lb)   SpO2 97%   BMI 29.04 kg/m      Constitutional:  cooperative, alert and oriented, well developed, well nourished, in no acute distress        Skin:  warm and dry to the touch, no apparent skin lesions or masses noted        Head:  normocephalic, no masses or lesions        Eyes:  pupils equal and round, conjunctivae and lids unremarkable, sclera white, no xanthalasma, EOMS intact, no nystagmus        ENT:  no pallor or cyanosis, dentition good        Neck:  carotid pulses are full and equal bilaterally, JVP normal, no carotid bruit        Chest:  normal breath sounds, clear to auscultation, normal A-P diameter, normal symmetry, normal respiratory excursion, no use of accessory muscles        Cardiac: regular rhythm, normal S1/S2, no S3 or S4, apical impulse not displaced, no murmurs, gallops or rubs                  Abdomen:  abdomen soft, non-tender, BS normoactive, no mass, no HSM, no bruits        Vascular: pulses full and equal                                      Extremities and Muscular Skeletal:        bilateral LE edema;trace;pitting     Neurological:  no gross motor deficits        Psych:  affect appropriate, oriented to time, person and place     Recent Lab Results:  LIPID RESULTS:  Lab Results   Component Value Date    CHOL 143 06/29/2023    CHOL 113 10/05/2020    HDL 45 06/29/2023    HDL 53 10/05/2020    LDL 85 06/29/2023    LDL 46 10/05/2020    TRIG 65 06/29/2023    TRIG 70 10/05/2020    CHOLHDLRATIO 3.6 10/12/2015       LIVER ENZYME RESULTS:  Lab Results "   Component Value Date    AST 24 06/29/2023    AST 32 05/24/2021    ALT 25 06/29/2023    ALT 39 05/24/2021       CBC RESULTS:  Lab Results   Component Value Date    WBC 13.5 (H) 04/24/2023    WBC 5.2 06/03/2021    RBC 5.45 04/24/2023    RBC 5.17 06/03/2021    HGB 15.6 04/24/2023    HGB 15.0 06/03/2021    HCT 48.6 04/24/2023    HCT 47.2 06/03/2021    MCV 89 04/24/2023    MCV 91 06/03/2021    MCH 28.6 04/24/2023    MCH 29.0 06/03/2021    MCHC 32.1 04/24/2023    MCHC 31.8 06/03/2021    RDW 12.7 04/24/2023    RDW 13.1 06/03/2021     04/24/2023     06/03/2021       BMP RESULTS:  Lab Results   Component Value Date     06/29/2023     06/03/2021    POTASSIUM 4.4 06/29/2023    POTASSIUM 4.0 03/21/2022    POTASSIUM 4.2 06/03/2021    CHLORIDE 105 06/29/2023    CHLORIDE 107 03/21/2022    CHLORIDE 108 06/03/2021    CO2 26 06/29/2023    CO2 31 03/21/2022    CO2 28 06/03/2021    ANIONGAP 11 06/29/2023    ANIONGAP 5 03/21/2022    ANIONGAP 3 06/03/2021    GLC 88 06/29/2023    GLC 90 03/21/2022    GLC 93 06/03/2021    BUN 18.6 06/29/2023    BUN 15 03/21/2022    BUN 11 06/03/2021    CR 0.76 06/29/2023    CR 0.72 06/03/2021    GFRESTIMATED >90 06/29/2023    GFRESTIMATED >90 06/03/2021    GFRESTBLACK >90 06/03/2021    FREDDY 9.5 06/29/2023    FREDDY 9.1 06/03/2021        A1C RESULTS:  Lab Results   Component Value Date    A1C 5.3 09/03/2013       INR RESULTS:  Lab Results   Component Value Date    INR 1.06 03/21/2022         Rodrigue Pires MD, [facility]    CC  No referring provider defined for this encounter.

## 2023-07-25 NOTE — LETTER
7/25/2023    Reyes Hinkle MD  [address] [facility] [address]    RE: Vladimir Morse       Dear Colleague,     I had the pleasure of seeing Vladimir Morse in the [facility].  HISTORY:    Vladimir Morse is a pleasant 67-year-old male accompanied by his wife today.  He has a history of treated hypertension and hyperlipidemia.  He was first seen virtually in 2002 after experiencing an episode of chest discomfort.  A nuclear stress test was read as abnormal and echocardiogram at that time suggested an EF of 45% (the nuclear scan had read an EF of 52% at rest).  A calcium scoring exam showed a calcium score of 719 placing him at the 86 percentile for age and gender and a coronary angiogram showed mild nonobstructive atherosclerosis with the tightest lesion of 50% in a high diagonal.  Vladimir is here today for a routine follow-up visit.    Today Vladimir reports that he feels that he is doing very well.  He remains physically active and does not experience any limitations.  He denies exertional chest arm neck shoulder or jaw discomfort as well as symptoms of palpitations, PND/orthopnea, syncope or near syncope, peripheral edema, or claudication.    A recent echocardiogram is reviewed with him and shows an ejection fraction estimated to be 50 to 55% with normal diastolic function and no significant valvular abnormalities.  It was essentially a normal echocardiogram with low normal ejection fraction.  The patient continues to use ACE inhibitor and beta-blocker for both blood pressure control and because of his borderline reduced ejection fraction.  The current echo was directly compared to the previous echo and the ejection fraction is unchanged.      ASSESSMENT/PLAN:    1.  Mild cardiomyopathy versus low normal ejection fraction.  His ejection fraction remains stable and he is on ACE inhibitor and beta-blocker.  His ejection fraction is now essentially normal and we will continue his current  medications indefinitely.  Of note, although his EF is borderline low his diastolic function remains normal.  2.  Hyperlipidemia.  Patient is using Lipitor 20 mg daily with his most recent LDL little higher than on previous studies.  3.  Hypertension well-controlled.      Thank you for inviting me to participate in the care of your patient.  Please don't hesitate to call if I can be of further assistance.  30 minutes were spent today reviewing the chart and other records, interviewing and examining the patient, and documenting our visit.  No medication changes are made today.  A 2-year follow-up with echocardiogram will be planned but I would be happy to see him sooner should the need arise.    Chart documentation was completed, in part, with Insync voice-recognition software. Even though reviewed, some grammatical, spelling, and word errors may remain.       Orders Placed This Encounter   Procedures    Follow-Up with Cardiologist    EKG 12-lead complete w/read - Clinics (performed today)     Orders Placed This Encounter   Medications    metoprolol succinate ER (TOPROL XL) 25 MG 24 hr tablet     Sig: Take 0.5 tablets (12.5 mg) by mouth daily     Dispense:  45 tablet     Refill:  3     Medications Discontinued During This Encounter   Medication Reason    metoprolol succinate ER (TOPROL XL) 25 MG 24 hr tablet Reorder (No AVS)       10 year ASCVD risk: The 10-year ASCVD risk score (Radha WANG, et al., 2019) is: 13.6%    Values used to calculate the score:      Age: 67 years      Sex: Male      Is Non- : No      Diabetic: No      Tobacco smoker: No      Systolic Blood Pressure: 122 mmHg      Is BP treated: Yes      HDL Cholesterol: 45 mg/dL      Total Cholesterol: 143 mg/dL    Encounter Diagnoses   Name Primary?    NSVT (nonsustained ventricular tachycardia) (H) Yes    PVC's (premature ventricular contractions)     Other cardiomyopathy (H)        CURRENT MEDICATIONS:  Current Outpatient  Medications   Medication Sig Dispense Refill    aspirin 81 MG EC tablet Take 81 mg by mouth daily      atorvastatin (LIPITOR) 20 MG tablet Take 1 tablet (20 mg) by mouth daily 90 tablet 3    fluticasone (FLONASE) 50 MCG/ACT nasal spray Spray 1 spray into both nostrils daily (Patient taking differently: Spray 1 spray into both nostrils as needed) 9.9 mL 0    metoprolol succinate ER (TOPROL XL) 25 MG 24 hr tablet Take 0.5 tablets (12.5 mg) by mouth daily 45 tablet 3    ramipril (ALTACE) 10 MG capsule Take 1 capsule (10 mg) by mouth daily 90 capsule 3       ALLERGIES     Allergies   Allergen Reactions    No Known Drug Allergy        PAST MEDICAL HISTORY:  Past Medical History:   Diagnosis Date    Abdominal pain, left lower quadrant 06/02/2022    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    BPPV (benign paroxysmal positional vertigo), unspecified laterality 07/03/2015    Cerebral infarction (H)     Elevated coronary artery calcium score 6/30/2023    Gilbert's syndrome 12/13/2016    Gross hematuria 06/02/2022    Hyperlipidemia LDL goal <100 11/28/2014    Hypertension 02/22/2018    Lipoma of skin and subcutaneous tissue 06/22/2012    Nephrolithiasis 06/22/2012    Primary osteoarthritis of right knee 12/22/2017    Recurrent intestinal obstruction (H) 07/27/2012    Resolved with conservative management, again on 6/27/2014 & 5/24/2021.    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       PAST SURGICAL HISTORY:  Past Surgical History:   Procedure Laterality Date    ABDOMEN SURGERY  8/2/1956    Perforated bowel    ARTHROSCOPY KNEE Right 10/26/2015    Procedure: ARTHROSCOPY KNEE;   Surgeon: Rodrigue Cole MD;  Location:  OR    AS TOTAL KNEE ARTHROPLASTY Right 03/05/2018    COLONOSCOPY  11/23/2015    Dr. Freedman [facility]    COLONOSCOPY N/A 07/13/2021    Procedure: COLONOSCOPY;  Surgeon: Luzmaria Ny MD;  Location:  GI    CV CORONARY ANGIOGRAM N/A 03/21/2022    Procedure: Coronary Angiogram [[id number]];  Surgeon: Oma Panda MD;  Location:  HEART CARDIAC CATH LAB    CYSTOSCOPY, RETROGRADES, INSERT STENT URETER(S), COMBINED  06/29/2012    Procedure: COMBINED CYSTOSCOPY, RETROGRADES, INSERT STENT URETER(S);  LEFT URETEROSCOPY, LEFT URETERAL STENT PLACEMENT;  Surgeon: Timothy Ortega MD;  Location:  OR    EXCISE LESION BACK Left 09/21/2018    Procedure: EXCISE LESION BACK;  Excision subcutaneous mass and skin lesion left back;  Surgeon: Prosper Loving MD;  Location:  OR. Path: lipoma.    EXTRACORPOREAL SHOCK WAVE LITHOTRIPSY (ESWL)  12/20/2012    Procedure: EXTRACORPOREAL SHOCK WAVE LITHOTRIPSY (ESWL);  LEFT EXTRACORPOREAL SHOCKWAVE LITHOTRIPSY ;  Surgeon: Timothy Ortega MD;  Location:  OR    EYE SURGERY      Cataracts    HC KNEE ARTHROSCOPY, MED+LAT MENISCUS REPAIR Left 01/25/2010    Left knee diagnostic arthroscopy and removal of loose bodies and medial and lateral meniscal debridement.    PERONEAL NERVE DECOMPRESSION Left 02/11/2010    and Excision proximal tibiofibular joint ganglion cyst.    TONSILLECTOMY & ADENOIDECTOMY  1964    T&A as a child    [facility] NONSPECIFIC PROCEDURE  1955    Perforated bowel; infant. Ilio-colonic anastomosis?       FAMILY HISTORY:  Family History   Problem Relation Age of Onset    Breast Cancer Mother     Colon Cancer Father     Myocardial Infarction Father     Skin Cancer Father     Cerebrovascular Disease Brother     Other - See Comments Brother         quadriplegia from an accident    Lung Cancer Brother 66        was a smokder.    Arthritis Brother     No Known Problems Brother     Breast Cancer Sister     Nephrolithiasis Son     No  Known Problems Son     Other - See Comments Son         hip impingement syndrome.    No Known Problems Daughter        SOCIAL HISTORY:  Social History     Socioeconomic History    Marital status:      Spouse name: None    Number of children: None    Years of education: None    Highest education level: None   Occupational History    Occupation: Retired. Was a   for [employer].   Tobacco Use    Smoking status: Never    Smokeless tobacco: Never   Vaping Use    Vaping Use: Never used   Substance and Sexual Activity    Alcohol use: Yes     Comment: rarely    Drug use: No    Sexual activity: Yes     Partners: Female     Birth control/protection: None   Other Topics Concern    Parent/sibling w/ CABG, MI or angioplasty before 65F 55M? No     Social Determinants of Health     Financial Resource Strain: Low Risk  (6/28/2023)    Overall Financial Resource Strain (CARDIA)     Difficulty of Paying Living Expenses: Not hard at all   Food Insecurity: No Food Insecurity (6/28/2023)    Hunger Vital Sign     Worried About Running Out of Food in the Last Year: Never true     Ran Out of Food in the Last Year: Never true   Transportation Needs: Unknown (6/28/2023)    PRAPARE - Transportation     Lack of Transportation (Medical): No     Lack of Transportation (Non-Medical): Patient refused   Physical Activity: Inactive (6/28/2023)    Exercise Vital Sign     Days of Exercise per Week: 0 days     Minutes of Exercise per Session: 0 min   Stress: No Stress Concern Present (6/28/2023)    [place] [employer] - Occupational Stress Questionnaire     Feeling of Stress : Not at all   Social Connections: Unknown (6/28/2023)    Social Connection and Isolation Panel [NHANES]     Frequency of Communication with Friends and Family: Once a week     Frequency of Social Gatherings with Friends and Family: Once a week     Attends [employer] Services: Patient refused     Active Member of Clubs or  "Organizations: No     Marital Status:    Housing Stability: Unknown (6/28/2023)    Housing Stability Vital Sign     Unable to Pay for Housing in the Last Year: Patient refused     Number of Places Lived in the Last Year: 1       Review of Systems:  Skin:        Eyes:       ENT:       Respiratory:  Negative    Cardiovascular:  Negative    Gastroenterology:      Genitourinary:       Musculoskeletal:       Neurologic:       Psychiatric:       Heme/Lymph/Imm:       Endocrine:         Physical Exam:  Vitals: /64 (BP Location: Right arm, Patient Position: Sitting, Cuff Size: Adult Regular)   Pulse 58   Ht 1.727 m (5' 8\")   Wt 86.6 kg (191 lb)   SpO2 97%   BMI 29.04 kg/m      Constitutional:  cooperative, alert and oriented, well developed, well nourished, in no acute distress        Skin:  warm and dry to the touch, no apparent skin lesions or masses noted        Head:  normocephalic, no masses or lesions        Eyes:  pupils equal and round, conjunctivae and lids unremarkable, sclera white, no xanthalasma, EOMS intact, no nystagmus        ENT:  no pallor or cyanosis, dentition good        Neck:  carotid pulses are full and equal bilaterally, JVP normal, no carotid bruit        Chest:  normal breath sounds, clear to auscultation, normal A-P diameter, normal symmetry, normal respiratory excursion, no use of accessory muscles        Cardiac: regular rhythm, normal S1/S2, no S3 or S4, apical impulse not displaced, no murmurs, gallops or rubs                  Abdomen:  abdomen soft, non-tender, BS normoactive, no mass, no HSM, no bruits        Vascular: pulses full and equal                                      Extremities and Muscular Skeletal:        bilateral LE edema;trace;pitting     Neurological:  no gross motor deficits        Psych:  affect appropriate, oriented to time, person and place     Recent Lab Results:  LIPID RESULTS:  Lab Results   Component Value Date    CHOL 143 06/29/2023    CHOL 113 " 10/05/2020    HDL 45 06/29/2023    HDL 53 10/05/2020    LDL 85 06/29/2023    LDL 46 10/05/2020    TRIG 65 06/29/2023    TRIG 70 10/05/2020    CHOLHDLRATIO 3.6 10/12/2015       LIVER ENZYME RESULTS:  Lab Results   Component Value Date    AST 24 06/29/2023    AST 32 05/24/2021    ALT 25 06/29/2023    ALT 39 05/24/2021       CBC RESULTS:  Lab Results   Component Value Date    WBC 13.5 (H) 04/24/2023    WBC 5.2 06/03/2021    RBC 5.45 04/24/2023    RBC 5.17 06/03/2021    HGB 15.6 04/24/2023    HGB 15.0 06/03/2021    HCT 48.6 04/24/2023    HCT 47.2 06/03/2021    MCV 89 04/24/2023    MCV 91 06/03/2021    MCH 28.6 04/24/2023    MCH 29.0 06/03/2021    MCHC 32.1 04/24/2023    MCHC 31.8 06/03/2021    RDW 12.7 04/24/2023    RDW 13.1 06/03/2021     04/24/2023     06/03/2021       BMP RESULTS:  Lab Results   Component Value Date     06/29/2023     06/03/2021    POTASSIUM 4.4 06/29/2023    POTASSIUM 4.0 03/21/2022    POTASSIUM 4.2 06/03/2021    CHLORIDE 105 06/29/2023    CHLORIDE 107 03/21/2022    CHLORIDE 108 06/03/2021    CO2 26 06/29/2023    CO2 31 03/21/2022    CO2 28 06/03/2021    ANIONGAP 11 06/29/2023    ANIONGAP 5 03/21/2022    ANIONGAP 3 06/03/2021    GLC 88 06/29/2023    GLC 90 03/21/2022    GLC 93 06/03/2021    BUN 18.6 06/29/2023    BUN 15 03/21/2022    BUN 11 06/03/2021    CR 0.76 06/29/2023    CR 0.72 06/03/2021    GFRESTIMATED >90 06/29/2023    GFRESTIMATED >90 06/03/2021    GFRESTBLACK >90 06/03/2021    FREDDY 9.5 06/29/2023    FREDDY 9.1 06/03/2021        A1C RESULTS:  Lab Results   Component Value Date    A1C 5.3 09/03/2013       INR RESULTS:  Lab Results   Component Value Date    INR 1.06 03/21/2022         Rodrigue Pires MD, [facility]    CC  No referring provider defined for this encounter.      Thank you for allowing me to participate in the care of your patient.      Sincerely,     Rodrigue Pires MD     [facility]

## 2024-07-17 SDOH — HEALTH STABILITY: PHYSICAL HEALTH: ON AVERAGE, HOW MANY DAYS PER WEEK DO YOU ENGAGE IN MODERATE TO STRENUOUS EXERCISE (LIKE A BRISK WALK)?: 7 DAYS

## 2024-07-17 SDOH — HEALTH STABILITY: PHYSICAL HEALTH: ON AVERAGE, HOW MANY MINUTES DO YOU ENGAGE IN EXERCISE AT THIS LEVEL?: 40 MIN

## 2024-07-17 ASSESSMENT — SOCIAL DETERMINANTS OF HEALTH (SDOH): HOW OFTEN DO YOU GET TOGETHER WITH FRIENDS OR RELATIVES?: TWICE A WEEK

## 2024-07-18 ENCOUNTER — OFFICE VISIT (OUTPATIENT)
Dept: FAMILY MEDICINE | Facility: CLINIC | Age: 69
End: 2024-07-18
Payer: COMMERCIAL

## 2024-07-18 VITALS
WEIGHT: 177 LBS | HEART RATE: 62 BPM | TEMPERATURE: 98.5 F | HEIGHT: 68 IN | SYSTOLIC BLOOD PRESSURE: 138 MMHG | OXYGEN SATURATION: 99 % | DIASTOLIC BLOOD PRESSURE: 77 MMHG | BODY MASS INDEX: 26.83 KG/M2 | RESPIRATION RATE: 16 BRPM

## 2024-07-18 DIAGNOSIS — Z23 NEED FOR TDAP VACCINATION: ICD-10-CM

## 2024-07-18 DIAGNOSIS — I47.29 NSVT (NONSUSTAINED VENTRICULAR TACHYCARDIA) (H): ICD-10-CM

## 2024-07-18 DIAGNOSIS — I42.8 OTHER CARDIOMYOPATHY (H): ICD-10-CM

## 2024-07-18 DIAGNOSIS — Z12.5 SCREENING FOR PROSTATE CANCER: ICD-10-CM

## 2024-07-18 DIAGNOSIS — I10 ESSENTIAL HYPERTENSION, BENIGN: ICD-10-CM

## 2024-07-18 DIAGNOSIS — R93.1 ELEVATED CORONARY ARTERY CALCIUM SCORE: ICD-10-CM

## 2024-07-18 DIAGNOSIS — Z00.00 ENCOUNTER FOR MEDICARE ANNUAL WELLNESS EXAM: Primary | ICD-10-CM

## 2024-07-18 DIAGNOSIS — Z29.11 NEED FOR VACCINATION AGAINST RESPIRATORY SYNCYTIAL VIRUS: ICD-10-CM

## 2024-07-18 LAB
ALBUMIN SERPL BCG-MCNC: 4.4 G/DL (ref 3.5–5.2)
ALP SERPL-CCNC: 101 U/L (ref 40–150)
ALT SERPL W P-5'-P-CCNC: 20 U/L (ref 0–70)
ANION GAP SERPL CALCULATED.3IONS-SCNC: 8 MMOL/L (ref 7–15)
AST SERPL W P-5'-P-CCNC: 21 U/L (ref 0–45)
BILIRUB SERPL-MCNC: 1.9 MG/DL
BUN SERPL-MCNC: 20.1 MG/DL (ref 8–23)
CALCIUM SERPL-MCNC: 9.2 MG/DL (ref 8.8–10.4)
CHLORIDE SERPL-SCNC: 107 MMOL/L (ref 98–107)
CHOLEST SERPL-MCNC: 126 MG/DL
CREAT SERPL-MCNC: 0.81 MG/DL (ref 0.67–1.17)
EGFRCR SERPLBLD CKD-EPI 2021: >90 ML/MIN/1.73M2
FASTING STATUS PATIENT QL REPORTED: YES
FASTING STATUS PATIENT QL REPORTED: YES
GLUCOSE SERPL-MCNC: 99 MG/DL (ref 70–99)
HCO3 SERPL-SCNC: 29 MMOL/L (ref 22–29)
HDLC SERPL-MCNC: 37 MG/DL
LDLC SERPL CALC-MCNC: 73 MG/DL
NONHDLC SERPL-MCNC: 89 MG/DL
POTASSIUM SERPL-SCNC: 4.6 MMOL/L (ref 3.4–5.3)
PROT SERPL-MCNC: 7.5 G/DL (ref 6.4–8.3)
PSA SERPL DL<=0.01 NG/ML-MCNC: 2.83 NG/ML (ref 0–4.5)
SODIUM SERPL-SCNC: 144 MMOL/L (ref 135–145)
TRIGL SERPL-MCNC: 79 MG/DL

## 2024-07-18 PROCEDURE — 36415 COLL VENOUS BLD VENIPUNCTURE: CPT | Performed by: PHYSICIAN ASSISTANT

## 2024-07-18 PROCEDURE — 80061 LIPID PANEL: CPT | Performed by: PHYSICIAN ASSISTANT

## 2024-07-18 PROCEDURE — 80053 COMPREHEN METABOLIC PANEL: CPT | Performed by: PHYSICIAN ASSISTANT

## 2024-07-18 PROCEDURE — 99214 OFFICE O/P EST MOD 30 MIN: CPT | Mod: 25 | Performed by: PHYSICIAN ASSISTANT

## 2024-07-18 PROCEDURE — G0439 PPPS, SUBSEQ VISIT: HCPCS | Performed by: PHYSICIAN ASSISTANT

## 2024-07-18 PROCEDURE — G0103 PSA SCREENING: HCPCS | Performed by: PHYSICIAN ASSISTANT

## 2024-07-18 RX ORDER — RESPIRATORY SYNCYTIAL VIRUS VACCINE 120MCG/0.5
0.5 KIT INTRAMUSCULAR ONCE
Qty: 1 EACH | Refills: 0 | Status: CANCELLED | OUTPATIENT
Start: 2024-07-18 | End: 2024-07-18

## 2024-07-18 NOTE — PATIENT INSTRUCTIONS
Patient Education   Preventive Care Advice   This is general advice given by our system to help you stay healthy. However, your care team may have specific advice just for you. Please talk to your care team about your preventive care needs.  Nutrition  Eat 5 or more servings of fruits and vegetables each day.  Try wheat bread, brown rice and whole grain pasta (instead of white bread, rice, and pasta).  Get enough calcium and vitamin D. Check the label on foods and aim for 100% of the RDA (recommended daily allowance).  Lifestyle  Exercise at least 150 minutes each week  (30 minutes a day, 5 days a week).  Do muscle strengthening activities 2 days a week. These help control your weight and prevent disease.  No smoking.  Wear sunscreen to prevent skin cancer.  Have a dental exam and cleaning every 6 months.  Yearly exams  See your health care team every year to talk about:  Any changes in your health.  Any medicines your care team has prescribed.  Preventive care, family planning, and ways to prevent chronic diseases.  Shots (vaccines)   HPV shots (up to age 26), if you've never had them before.  Hepatitis B shots (up to age 59), if you've never had them before.  COVID-19 shot: Get this shot when it's due.  Flu shot: Get a flu shot every year.  Tetanus shot: Get a tetanus shot every 10 years.  Pneumococcal, hepatitis A, and RSV shots: Ask your care team if you need these based on your risk.  Shingles shot (for age 50 and up)  General health tests  Diabetes screening:  Starting at age 35, Get screened for diabetes at least every 3 years.  If you are younger than age 35, ask your care team if you should be screened for diabetes.  Cholesterol test: At age 39, start having a cholesterol test every 5 years, or more often if advised.  Bone density scan (DEXA): At age 50, ask your care team if you should have this scan for osteoporosis (brittle bones).  Hepatitis C: Get tested at least once in your life.  STIs (sexually  transmitted infections)  Before age 24: Ask your care team if you should be screened for STIs.  After age 24: Get screened for STIs if you're at risk. You are at risk for STIs (including HIV) if:  You are sexually active with more than one person.  You don't use condoms every time.  You or a partner was diagnosed with a sexually transmitted infection.  If you are at risk for HIV, ask about PrEP medicine to prevent HIV.  Get tested for HIV at least once in your life, whether you are at risk for HIV or not.  Cancer screening tests  Cervical cancer screening: If you have a cervix, begin getting regular cervical cancer screening tests starting at age 21.  Breast cancer scan (mammogram): If you've ever had breasts, begin having regular mammograms starting at age 40. This is a scan to check for breast cancer.  Colon cancer screening: It is important to start screening for colon cancer at age 45.  Have a colonoscopy test every 10 years (or more often if you're at risk) Or, ask your provider about stool tests like a FIT test every year or Cologuard test every 3 years.  To learn more about your testing options, visit:   .  For help making a decision, visit:   https://bit.ly/lf87147.  Prostate cancer screening test: If you have a prostate, ask your care team if a prostate cancer screening test (PSA) at age 55 is right for you.  Lung cancer screening: If you are a current or former smoker ages 50 to 80, ask your care team if ongoing lung cancer screenings are right for you.  For informational purposes only. Not to replace the advice of your health care provider. Copyright   2023 [employer]. All rights reserved. Clinically reviewed by the [facility] Transitions Program. [employer] [id number] - REV 01/24.

## 2024-07-18 NOTE — PROGRESS NOTES
"Preventive Care Visit  [facility]  Markus Matta PA-C, Physician Assistant  Jul 18, 2024      Assessment & Plan     (Z00.00) Encounter for Medicare annual wellness exam  (primary encounter diagnosis)  Comment:   Plan: advised vaccines at pharmacy based on coverage, labs updated today    (Z23) Need for Tdap vaccination  Comment:   Plan:     (Z29.11) Need for vaccination against respiratory syncytial virus  Comment:   Plan:     (Z12.5) Screening for prostate cancer  Comment:   Plan: PROSTATE SPEC ANTIGEN SCREEN            (I10) Essential hypertension, benign  Comment:   Plan: COMPREHENSIVE METABOLIC PANEL        Blood pressure well controlled today. Advised to continue with current medication regimen and follow up in 12 months. Stressed the importance of regular blood pressure monitoring outside of clinic, regular aerobic exercise, low salt diet, refraining from smoking/tobacco products and moderation of alcohol use.      (R93.1) Elevated coronary artery calcium score  Comment:   Plan: Lipid panel reflex to direct LDL Non-fasting            (I47.29) NSVT (nonsustained ventricular tachycardia) (H)  Comment:   Plan: rate well controlled today, taking metoprolol as directed without side effects    (I42.8) Other cardiomyopathy (H)  Comment:   Plan: managed by cardiology, sees once yearly, no new sx.            BMI  Estimated body mass index is 26.83 kg/m  as calculated from the following:    Height as of this encounter: 1.73 m (5' 8.11\").    Weight as of this encounter: 80.3 kg (177 lb).       Counseling  Appropriate preventive services were addressed with this patient via screening, questionnaire, or discussion as appropriate for fall prevention, nutrition, physical activity, Tobacco-use cessation, weight loss and cognition.  Checklist reviewing preventive services available has been given to the patient.  Reviewed patient's diet, addressing concerns and/or questions.           Subjective "   Vladimir is a 68 year old, presenting for the following:  Annual Visit        7/18/2024    10:20 AM   Additional Questions   Roomed by All         Health Care Directive  Patient does not have a Health Care Directive or Living Will: Discussed advance care planning with patient; information given to patient to review.    HPI      Hyperlipidemia Follow-Up    Are you regularly taking any medication or supplement to lower your cholesterol?   Yes-    Are you having muscle aches or other side effects that you think could be caused by your cholesterol lowering medication?  No    Hypertension Follow-up    Do you check your blood pressure regularly outside of the clinic? Yes   Are you following a low salt diet? Yes  Are your blood pressures ever more than 140 on the top number (systolic) OR more   than 90 on the bottom number (diastolic), for example 140/90? No    Vascular Disease Follow-up    How often do you take nitroglycerin? Never  Do you take an aspirin every day? Yes    Cardiomyopathy managed by cardiology, last echo 2022, EKG stable last year. No CP, SOB, MONTEJO or LE edema        7/17/2024   General Health   How would you rate your overall physical health? Good   Feel stress (tense, anxious, or unable to sleep) Not at all            7/17/2024   Nutrition   Diet: Regular (no restrictions)            7/17/2024   Exercise   Days per week of moderate/strenous exercise 7 days   Average minutes spent exercising at this level 40 min            7/17/2024   Social Factors   Frequency of gathering with friends or relatives Twice a week   Worry food won't last until get money to buy more No   Food not last or not have enough money for food? No   Do you have housing? (Housing is defined as stable permanent housing and does not include staying ouside in a car, in a tent, in an abandoned building, in an overnight shelter, or couch-surfing.) Yes   Are you worried about losing your housing? No   Lack of transportation? No   Unable to  get utilities (heat,electricity)? No            7/17/2024   Fall Risk   Fallen 2 or more times in the past year? No    No   Trouble with walking or balance? No    No       Multiple values from one day are sorted in reverse-chronological order          7/17/2024   Activities of Daily Living- Home Safety   Needs help with the following daily activites None of the above   Safety concerns in the home None of the above            7/17/2024   Dental   Dentist two times every year? Yes            7/17/2024   Hearing Screening   Hearing concerns? None of the above            7/17/2024   Driving Risk Screening   Patient/family members have concerns about driving No            7/17/2024   General Alertness/Fatigue Screening   Have you been more tired than usual lately? No            7/17/2024   Urinary Incontinence Screening   Bothered by leaking urine in past 6 months No            7/17/2024   TB Screening   Were you born outside of the [place]? No        Today's PHQ-2 Score:       7/17/2024     9:34 AM   PHQ-2 ( 1999 Pf[employer])   Q1: Little interest or pleasure in doing things 0   Q2: Feeling down, depressed or hopeless 0   PHQ-2 Score 0   Q1: Little interest or pleasure in doing things Not at all   Q2: Feeling down, depressed or hopeless Not at all   PHQ-2 Score 0           7/17/2024   Substance Use   Alcohol more than 3/day or more than 7/wk No   Do you have a current opioid prescription? No   How severe/bad is pain from 1 to 10? 0/10 (No Pain)   Do you use any other substances recreationally? No        Social History     Tobacco Use    Smoking status: Never    Smokeless tobacco: Never   Vaping Use    Vaping status: Never Used   Substance Use Topics    Alcohol use: Yes     Comment: rarely    Drug use: No           7/17/2024   AAA Screening   Family history of Abdominal Aortic Aneurysm (AAA)? No      Last PSA:   PSA   Date Value Ref Range Status   05/16/2019 1.41 0 - 4 ug/L Final     Comment:     Assay Method:  Chemiluminescence  "using Siemens Vista analyzer     Prostate Specific Antigen Screen   Date Value Ref Range Status   02/09/2022 2.23 0.00 - 4.00 ug/L Final     ASCVD Risk   The ASCVD Risk score (Radha WANG, et al., 2019) failed to calculate for the following reasons:    The patient has a prior MI or stroke diagnosis            Reviewed and updated as needed this visit by Provider                      Current providers sharing in care for this patient include:  Patient Care Team:  Reyes Hinkle MD as PCP - General (Family Practice)  Reyes Hinkle MD as Assigned PCP  Rodrigue Pires MD as MD (Cardiovascular Disease)  Rodrigue Pires MD as Assigned Heart and Vascular Provider    The following health maintenance items are reviewed in Epic and correct as of today:  Health Maintenance   Topic Date Due    RSV VACCINE (Pregnancy & 60+) (1 - 1-dose 60+ series) Never done    DTAP/TDAP/TD IMMUNIZATION (3 - Td or Tdap) 09/27/2022    COVID-19 Vaccine (9 - 2023-24 season) 12/23/2023    PSA  02/09/2024    ANNUAL REVIEW OF HM ORDERS  05/18/2024    CMP  06/29/2024    LIPID  06/29/2024    MEDICARE ANNUAL WELLNESS VISIT  06/29/2024    INFLUENZA VACCINE (1) 09/01/2024    FALL RISK ASSESSMENT  07/18/2025    GLUCOSE  06/29/2026    COLORECTAL CANCER SCREENING  07/13/2026    ADVANCE CARE PLANNING  06/30/2028    HEPATITIS C SCREENING  Completed    PHQ-2 (once per calendar year)  Completed    Pneumococcal Vaccine: 65+ Years  Completed    ZOSTER IMMUNIZATION  Completed    IPV IMMUNIZATION  Aged Out    HPV IMMUNIZATION  Aged Out    MENINGITIS IMMUNIZATION  Aged Out    RSV MONOCLONAL ANTIBODY  Aged Out            Objective    Exam  /77 (BP Location: Right arm, Patient Position: Sitting, Cuff Size: Adult Large)   Pulse 62   Temp 98.5  F (36.9  C) (Temporal)   Resp 16   Ht 1.73 m (5' 8.11\")   Wt 80.3 kg (177 lb)   SpO2 99%   BMI 26.83 kg/m     Estimated body mass index is 26.83 kg/m  as calculated from the following:    " "Height as of this encounter: 1.73 m (5' 8.11\").    Weight as of this encounter: 80.3 kg (177 lb).    Physical Exam  GENERAL: alert and no distress  EYES: Eyes grossly normal to inspection, PERRL and conjunctivae and sclerae normal  HENT: ear canals and TM's normal, nose and mouth without ulcers or lesions  NECK: no adenopathy, no asymmetry, masses, or scars  RESP: lungs clear to auscultation - no rales, rhonchi or wheezes  CV: regular rate and rhythm, normal S1 S2, no S3 or S4, no murmur, click or rub, no peripheral edema  ABDOMEN: soft, nontender, no hepatosplenomegaly, no masses and bowel sounds normal  MS: no gross musculoskeletal defects noted, no edema  SKIN: no suspicious lesions or rashes  NEURO: Normal strength and tone, mentation intact and speech normal  PSYCH: mentation appears normal, affect normal/bright        7/18/2024   Mini Cog   Clock Draw Score 2 Normal   3 Item Recall 3 objects recalled   Mini Cog Total Score 5                 Signed Electronically by: Markus Matta PA-C    "

## 2024-09-23 ENCOUNTER — MYC MEDICAL ADVICE (OUTPATIENT)
Dept: FAMILY MEDICINE | Facility: CLINIC | Age: 69
End: 2024-09-23
Payer: COMMERCIAL

## 2024-09-23 DIAGNOSIS — E78.00 PURE HYPERCHOLESTEROLEMIA: ICD-10-CM

## 2024-09-23 DIAGNOSIS — I67.1 CEREBRAL ANEURYSM: ICD-10-CM

## 2024-09-23 DIAGNOSIS — I10 ESSENTIAL HYPERTENSION, BENIGN: ICD-10-CM

## 2024-09-23 DIAGNOSIS — I49.3 PVC'S (PREMATURE VENTRICULAR CONTRACTIONS): ICD-10-CM

## 2024-09-23 RX ORDER — METOPROLOL SUCCINATE 25 MG/1
12.5 TABLET, EXTENDED RELEASE ORAL DAILY
Qty: 45 TABLET | Refills: 3 | Status: SHIPPED | OUTPATIENT
Start: 2024-09-23

## 2024-09-24 RX ORDER — RAMIPRIL 10 MG/1
10 CAPSULE ORAL DAILY
Qty: 90 CAPSULE | Refills: 2 | Status: SHIPPED | OUTPATIENT
Start: 2024-09-24

## 2024-09-24 RX ORDER — ATORVASTATIN CALCIUM 20 MG/1
20 TABLET, FILM COATED ORAL DAILY
Qty: 90 TABLET | Refills: 2 | Status: SHIPPED | OUTPATIENT
Start: 2024-09-24

## 2024-10-07 ENCOUNTER — OFFICE VISIT (OUTPATIENT)
Dept: FAMILY MEDICINE | Facility: CLINIC | Age: 69
End: 2024-10-07
Payer: COMMERCIAL

## 2024-10-07 VITALS
DIASTOLIC BLOOD PRESSURE: 82 MMHG | WEIGHT: 187.5 LBS | OXYGEN SATURATION: 98 % | HEART RATE: 75 BPM | BODY MASS INDEX: 28.42 KG/M2 | SYSTOLIC BLOOD PRESSURE: 138 MMHG | HEIGHT: 68 IN | TEMPERATURE: 98.1 F | RESPIRATION RATE: 21 BRPM

## 2024-10-07 DIAGNOSIS — R79.89 LOW TESTOSTERONE IN MALE: ICD-10-CM

## 2024-10-07 DIAGNOSIS — N52.9 ERECTILE DYSFUNCTION, UNSPECIFIED ERECTILE DYSFUNCTION TYPE: ICD-10-CM

## 2024-10-07 DIAGNOSIS — R53.83 OTHER FATIGUE: Primary | ICD-10-CM

## 2024-10-07 LAB
ERYTHROCYTE [DISTWIDTH] IN BLOOD BY AUTOMATED COUNT: 13.4 % (ref 10–15)
HCT VFR BLD AUTO: 48.6 % (ref 40–53)
HGB BLD-MCNC: 15.1 G/DL (ref 13.3–17.7)
MCH RBC QN AUTO: 28.7 PG (ref 26.5–33)
MCHC RBC AUTO-ENTMCNC: 31.1 G/DL (ref 31.5–36.5)
MCV RBC AUTO: 92 FL (ref 78–100)
PLATELET # BLD AUTO: 240 10E3/UL (ref 150–450)
RBC # BLD AUTO: 5.26 10E6/UL (ref 4.4–5.9)
SHBG SERPL-SCNC: 60 NMOL/L (ref 11–80)
TSH SERPL DL<=0.005 MIU/L-ACNC: 1.37 UIU/ML (ref 0.3–4.2)
WBC # BLD AUTO: 7.8 10E3/UL (ref 4–11)

## 2024-10-07 PROCEDURE — 85027 COMPLETE CBC AUTOMATED: CPT | Performed by: PHYSICIAN ASSISTANT

## 2024-10-07 PROCEDURE — 84270 ASSAY OF SEX HORMONE GLOBUL: CPT | Performed by: PHYSICIAN ASSISTANT

## 2024-10-07 PROCEDURE — 99214 OFFICE O/P EST MOD 30 MIN: CPT | Mod: 25 | Performed by: PHYSICIAN ASSISTANT

## 2024-10-07 PROCEDURE — 91320 SARSCV2 VAC 30MCG TRS-SUC IM: CPT | Performed by: PHYSICIAN ASSISTANT

## 2024-10-07 PROCEDURE — 36415 COLL VENOUS BLD VENIPUNCTURE: CPT | Performed by: PHYSICIAN ASSISTANT

## 2024-10-07 PROCEDURE — 84443 ASSAY THYROID STIM HORMONE: CPT | Performed by: PHYSICIAN ASSISTANT

## 2024-10-07 PROCEDURE — 84403 ASSAY OF TOTAL TESTOSTERONE: CPT | Performed by: PHYSICIAN ASSISTANT

## 2024-10-07 PROCEDURE — 90480 ADMN SARSCOV2 VAC 1/ONLY CMP: CPT | Performed by: PHYSICIAN ASSISTANT

## 2024-10-07 PROCEDURE — G0008 ADMIN INFLUENZA VIRUS VAC: HCPCS | Performed by: PHYSICIAN ASSISTANT

## 2024-10-07 PROCEDURE — 90662 IIV NO PRSV INCREASED AG IM: CPT | Performed by: PHYSICIAN ASSISTANT

## 2024-10-07 RX ORDER — TADALAFIL 5 MG/1
5 TABLET ORAL EVERY 24 HOURS
Qty: 90 TABLET | Refills: 1 | Status: SHIPPED | OUTPATIENT
Start: 2024-10-07

## 2024-10-07 ASSESSMENT — PAIN SCALES - GENERAL: PAINLEVEL: NO PAIN (0)

## 2024-10-07 ASSESSMENT — ENCOUNTER SYMPTOMS: FATIGUE: 1

## 2024-10-07 NOTE — PROGRESS NOTES
"  Assessment & Plan     (R53.83) Other fatigue  (primary encounter diagnosis)  Comment:   Plan: CBC with platelets, TSH with free T4 reflex,         Testosterone Free and Total          Recheck blood work as above, patient advised to continue to monitor fatigue levels, for cardiomyopathy as echocardiogram was stable last year, no increased lower extremity edema no PND symptoms.  If thyroid and CBC are within normal limits, consider further workup with possible sleep study for hypersomnia.  Follow-up in clinic after lab work is performed and after meeting with endocrinology.    (N52.9) Erectile dysfunction, unspecified erectile dysfunction type  Comment: Plan: tadalafil (CIALIS) 5 MG tablet, Testosterone         Free and Total          Alternative daily tadalafil as medication change for ED, also recheck testosterone and refer to endocrine for possible testosterone replacement therapy.    (R79.89) Low testosterone in male  Comment: Plan: Testosterone Free and Total, Adult         Endocrinology  Referral        See above    BMI  Estimated body mass index is 28.51 kg/m  as calculated from the following:    Height as of this encounter: 1.727 m (5' 8\").    Weight as of this encounter: 85 kg (187 lb 8 oz).             Jasmyne Gilbert is a 69 year old, presenting for the following health issues:  Fatigue (Insomnia and Fatigue for the past few weeks, never has this before. )      10/7/2024     1:02 PM   Additional Questions   Roomed by ANTONIO Nolan   Accompanied by self         10/7/2024     1:02 PM   Patient Reported Additional Medications   Patient reports taking the following new medications N/A     Fatigue  Associated symptoms include fatigue.   History of Present Illness       Reason for visit:  Low energy midday- discuss ED, check testosterone   He is taking medications regularly.       Patient with history of low testosterone in male and cardiomyopathy, presents today for worsening fatigue.  Patient " "reports that over the past 2 to 3 weeks he has been taking a 3 to 4-hour nap in days.  Sleeping normal throughout the night, nocturia x 1 or 2 but that was baseline previously.    In the past he was receiving supplemental testosterone transdermally through urology, also in the past for ED he was using sildenafil 100 mg with minimal relief.    Metabolic panel was within normal limits when checked previously, no recent medication changes prior to his increased somnolence.  Patient's wife reports that he does snore but no witnessed apnea.                  Objective    /82 (BP Location: Right arm, Patient Position: Sitting, Cuff Size: Adult Regular)   Pulse 75   Temp 98.1  F (36.7  C) (Temporal)   Resp 21   Ht 1.727 m (5' 8\")   Wt 85 kg (187 lb 8 oz)   SpO2 98%   BMI 28.51 kg/m    Body mass index is 28.51 kg/m .  Physical Exam   GENERAL: healthy, alert and no distress  EYES: Eyes grossly normal to inspection, EOM intact and conjunctivae normal  RESP: breathing comfortably on room air  PSYCH: mentation appears normal, affect normal/bright              Signed Electronically by: Markus Matta PA-C    "

## 2024-10-09 LAB
TESTOST FREE SERPL-MCNC: 6.75 NG/DL
TESTOST SERPL-MCNC: 486 NG/DL (ref 240–950)

## 2024-10-18 ENCOUNTER — MYC MEDICAL ADVICE (OUTPATIENT)
Dept: FAMILY MEDICINE | Facility: CLINIC | Age: 69
End: 2024-10-18
Payer: COMMERCIAL

## 2025-02-20 ENCOUNTER — OFFICE VISIT (OUTPATIENT)
Dept: ENDOCRINOLOGY | Facility: CLINIC | Age: 70
End: 2025-02-20
Attending: PHYSICIAN ASSISTANT
Payer: COMMERCIAL

## 2025-02-20 VITALS
BODY MASS INDEX: 29.16 KG/M2 | TEMPERATURE: 97.3 F | WEIGHT: 192.4 LBS | DIASTOLIC BLOOD PRESSURE: 74 MMHG | RESPIRATION RATE: 16 BRPM | OXYGEN SATURATION: 98 % | HEART RATE: 59 BPM | SYSTOLIC BLOOD PRESSURE: 114 MMHG | HEIGHT: 68 IN

## 2025-02-20 DIAGNOSIS — M17.11 PRIMARY OSTEOARTHRITIS OF RIGHT KNEE: ICD-10-CM

## 2025-02-20 DIAGNOSIS — E80.4 GILBERT'S SYNDROME: ICD-10-CM

## 2025-02-20 DIAGNOSIS — I10 ESSENTIAL HYPERTENSION, BENIGN: ICD-10-CM

## 2025-02-20 DIAGNOSIS — R53.83 OTHER FATIGUE: Primary | ICD-10-CM

## 2025-02-20 PROBLEM — R79.89 LOW TESTOSTERONE IN MALE: Status: ACTIVE | Noted: 2025-02-20

## 2025-02-20 NOTE — PATIENT INSTRUCTIONS
-[facility]  Dr Antoine, Endocrinology Department    [place] [address] [id number]  [place] [address]  Appointment [phone]  Fax: [phone]  [place]: Monday - Thursday      Please check the cost coverage and copay with insurance before recommended tests, services and medications (especially if new medications are prescribed).     If ordered, please get blood work done 1 week prior to your next appointment so they will be available to Dr. Antoine at your visit.    Start vit D suppplement  Follow up as needed     Discharge Summary    CHIEF COMPLAINT ON ADMISSION  Abdominal pain    Reason for Admission  Gastritis     Admission Date  10/24/2023    CODE STATUS  Prior    HPI & HOSPITAL COURSE  Rhiannon Marrero is a 36 y.o. female who presented 10/24/2023 with a past medical history of alcohol abuse, chronic pancreatitis, ongoing alcohol abuse with last drink 10 days ago, history of cholecystectomy, pancreatic jejunostomy in February who presents to the hospital from [facility] for nausea, vomiting and epigastric abdominal pain.  Patient has had multiple ER visits for similar symptoms.  She was first admitted on 10/17 at [facility] and during her hospital stay she had a endoscopy that revealed bile gastritis and gastric emptying study that revealed grade 2 gastroparesis.  The patient was transferred due to persistent epigastric abdominal pain, nausea and vomiting.  GI was consulted and evaluated the patient at bedside.  No intervention was recommended at this time.  Her Bentyl was discontinued.  She was continued on IV Reglan.  IV Toradol was added for pain control and narcotics were limited.  Her symptoms improved and she was able to tolerate a diet.  She will be discharged home with close outpatient follow-up with her GI Dr Johnson.    Therefore, she is discharged in good and stable condition to home with close outpatient follow-up.    The patient met 2-midnight criteria for an inpatient stay at the time of discharge.    Discharge Date  10/27/2023    FOLLOW UP ITEMS POST DISCHARGE  PCP and GI    DISCHARGE DIAGNOSES  Principal Problem:    Gastroparesis (POA: Yes)  Active Problems:    Alcohol dependence (HCC) (POA: Yes)    Bipolar disorder (HCC) (POA: Yes)    Drug-seeking behavior (POA: Yes)    Chronic pancreatitis due to chronic alcoholism (HCC) (POA: Yes)  Resolved Problems:    Lactic acidosis (POA: Yes)    Abdominal pain (POA: Yes)      FOLLOW UP  No future appointments.  MALOU Harrington  [address]  [facility] [address]  [phone]    Schedule an appointment as soon as possible for a visit in 1 week(s)        MEDICATIONS ON DISCHARGE     Medication List        START taking these medications        Instructions   metoclopramide 10 MG Tabs  Commonly known as: Reglan   Take 1 Tablet by mouth 2 times a day for 7 days.  Dose: 10 mg     ondansetron 4 MG Tbdp  Commonly known as: Zofran ODT   Dissolve 1 Tablet by mouth every four hours as needed for Nausea/Vomiting.  Dose: 4 mg     oxyCODONE immediate-release 5 MG Tabs  Commonly known as: Roxicodone   Take 1 Tablet by mouth every 6 hours as needed for Severe Pain for up to 5 days.  Dose: 5 mg            CONTINUE taking these medications        Instructions   Creon [id number] units Cpep  Generic drug: Pancrelipase (Lip-Prot-Amyl)   Take 1 Capful by mouth 3 times a day with meals.  Dose: 1 Capful     Doxepin HCl 6 MG Tabs   Take 6 mg by mouth every day.  Dose: 6 mg     gabapentin 600 MG tablet  Commonly known as: Neurontin   Take 600 mg by mouth in the morning, at noon, and at bedtime.  Dose: 600 mg     levETIRAcetam 500 MG Tabs  Commonly known as: Keppra   Take 500 mg by mouth 2 times a day.  Dose: 500 mg     OLANZapine 5 MG Tabs  Commonly known as: ZyPREXA   Take 5 mg by mouth every day.  Dose: 5 mg     QUEtiapine 100 MG Tabs  Commonly known as: SEROquel   Take 100 mg by mouth at bedtime.  Dose: 100 mg     sertraline 50 MG Tabs  Commonly known as: Zoloft   Take 50 mg by mouth every day.  Dose: 50 mg     traZODone 100 MG Tabs  Commonly known as: Desyrel   Take 100 mg by mouth every evening. Indications: Trouble Sleeping  Dose: 100 mg            STOP taking these medications      dicyclomine 20 MG Tabs  Commonly known as: Bentyl              Allergies  Allergies   Allergen Reactions    Promethazine Hcl Anxiety     Anxiety and makes her feels like skin coming off        DIET  No orders of the defined types were placed in this encounter.      ACTIVITY  As  tolerated.  Weight bearing as tolerated    CONSULTATIONS  GI  Neal    PROCEDURES  none    LABORATORY  Lab Results   Component Value Date    SODIUM 138 10/27/2023    POTASSIUM 3.5 (L) 10/27/2023    CHLORIDE 104 10/27/2023    CO2 23 10/27/2023    GLUCOSE 142 (H) 10/27/2023    BUN 10 10/27/2023    CREATININE 0.78 10/27/2023    CREATININE 0.7 06/10/2008    GLOMRATE 79 05/07/2023        Lab Results   Component Value Date    WBC 3.9 (L) 10/26/2023    HEMOGLOBIN 12.1 10/26/2023    HEMATOCRIT 35.9 (L) 10/26/2023    PLATELETCT 192 10/26/2023        Total time of the discharge process exceeds 32 minutes.

## 2025-02-20 NOTE — PROGRESS NOTES
Name: Vladimir Morse  Seen in consultation with Markus Matta PA-C for Low Testosterone/hypogonadism.  HPI:  Vladimir Morse is a 69 year old male who presents for the evaluation of low testosterone.   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), Bowel perforation (H), BPPV (benign paroxysmal positional vertigo), unspecified laterality (07/03/2015), Cerebral infarction (H), Elevated coronary artery calcium score (6/30/2023), Gilbert's syndrome (12/13/2016), Gross hematuria (06/02/2022), Hyperlipidemia LDL goal <100 (11/28/2014), Hypertension (02/22/2018), Lipoma of skin and subcutaneous tissue (06/22/2012), Nephrolithiasis (06/22/2012), Primary osteoarthritis of right knee (12/22/2017), Recurrent intestinal obstruction (H) (07/27/2012), Sensorineural hearing loss (SNHL) of both ears (07/19/2021), Sigmoid diverticulitis (06/21/2006), TIA (transient ischaemic attack), 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    10/2024  he was seen by primary care provider and he was complaining of fatigue.  Following that testosterone levels were checked and was referred to endocrinology.  10/2024 labs--normal testosterone, TSH and CBC    Feels tired X few months     Body mass index is 29.26 kg/m .    Shave-No  Hair Growth/Changes-No  Muscle strength-No  OTC Herbal-No  Previous use of testosterone: NO  Sleeping: not able to fall asleep. Takes naps during day. Mild snoring. No Sleep apnea  Patient feels well at this time and denies any tachycardia, palpitations, heat intolerance, tremor, insomnia, diarrhea, or unexplained weight loss.  Patient also denies  cold intolerance, constipation, or unexplained weight gain.   Wt Readings from Last 2 Encounters:   02/20/25 87.3 kg (192 lb 6.4 oz)   10/07/24 85 kg (187 lb 8 oz)     PMH/PSH:  Past Medical History:   Diagnosis Date    Abdominal pain, left lower  quadrant 06/02/2022    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    BPPV (benign paroxysmal positional vertigo), unspecified laterality 07/03/2015    Cerebral infarction (H)     Elevated coronary artery calcium score 6/30/2023    Gilbert's syndrome 12/13/2016    Gross hematuria 06/02/2022    Hyperlipidemia LDL goal <100 11/28/2014    Hypertension 02/22/2018    Lipoma of skin and subcutaneous tissue 06/22/2012    Nephrolithiasis 06/22/2012    Primary osteoarthritis of right knee 12/22/2017    Recurrent intestinal obstruction (H) 07/27/2012    Resolved with conservative management, again on 6/27/2014 & 5/24/2021.    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     Past Surgical History:   Procedure Laterality Date    ABDOMEN SURGERY  8/2/1956    Perforated bowel    ARTHROSCOPY KNEE Right 10/26/2015    Procedure: ARTHROSCOPY KNEE;  Surgeon: Rodrigue Cole MD;  Location:  OR    AS TOTAL KNEE ARTHROPLASTY Right 03/05/2018    [facility]  [facility]    COLONOSCOPY N/A 07/13/2021    Procedure: COLONOSCOPY;  Surgeon: Luzmaria yN MD;  Location:  GI    CV CORONARY ANGIOGRAM N/A 03/21/2022    Procedure: Coronary Angiogram [[id number]];  Surgeon: Oma Panda MD;  Location:  HEART CARDIAC CATH LAB    CYSTOSCOPY, RETROGRADES, INSERT STENT URETER(S), COMBINED  06/29/2012    Procedure: COMBINED CYSTOSCOPY, RETROGRADES, INSERT STENT URETER(S);  LEFT URETEROSCOPY, LEFT URETERAL STENT PLACEMENT;  Surgeon: Timothy Ortega MD;  Location:  OR    EXCISE LESION BACK Left 09/21/2018    Procedure: EXCISE LESION BACK;  Excision subcutaneous mass  and skin lesion left back;  Surgeon: Prosper Loving MD;  Location: [facility] OR. Path: lipoma.    EXTRACORPOREAL SHOCK WAVE LITHOTRIPSY (ESWL)  12/20/2012    Procedure: EXTRACORPOREAL SHOCK WAVE LITHOTRIPSY (ESWL);  LEFT EXTRACORPOREAL SHOCKWAVE LITHOTRIPSY ;  Surgeon: Timothy Ortega MD;  Location: [facility] OR    EYE SURGERY      Cataracts    HC KNEE ARTHROSCOPY, MED+LAT MENISCUS REPAIR Left 01/25/2010    Left knee diagnostic arthroscopy and removal of loose bodies and medial and lateral meniscal debridement.    PERONEAL NERVE DECOMPRESSION Left 02/11/2010    and Excision proximal tibiofibular joint ganglion cyst.    TONSILLECTOMY & ADENOIDECTOMY  1964    T&A as a child    ZZC NONSPECIFIC PROCEDURE  1955    Perforated bowel; infant. Ilio-colonic anastomosis?     Family Hx:  Family History   Problem Relation Age of Onset    Breast Cancer Mother 55    Colon Cancer Father     Myocardial Infarction Father     Skin Cancer Father     Cerebrovascular Disease Brother     Other - See Comments Brother         quadriplegia from an accident    Lung Cancer Brother 66        was a smokder.    Arthritis Brother     No Known Problems Brother     Breast Cancer Sister 65    Nephrolithiasis Son     No Known Problems Son     Other - See Comments Son         hip impingement syndrome.    No Known Problems Daughter        Social Hx:  Social History     Socioeconomic History    Marital status:      Spouse name: Not on file    Number of children: Not on file    Years of education: Not on file    Highest education level: Not on file   Occupational History    Occupation: Retired. Was a   for [employer].   Tobacco Use    Smoking status: Never    Smokeless tobacco: Never   Vaping Use    Vaping status: Never Used   Substance and Sexual Activity    Alcohol use: Yes     Comment: rarely    Drug use: No    Sexual activity: Yes     Partners: Female     Birth control/protection: None   Other Topics Concern     Parent/sibling w/ CABG, MI or angioplasty before 65F 55M? No   Social History Narrative    Not on file     Social Drivers of Health     Financial Resource Strain: Low Risk  (7/17/2024)    Financial Resource Strain     Within the past 12 months, have you or your family members you live with been unable to get utilities (heat, electricity) when it was really needed?: No   Food Insecurity: Low Risk  (7/17/2024)    Food Insecurity     Within the past 12 months, did you worry that your food would run out before you got money to buy more?: No     Within the past 12 months, did the food you bought just not last and you didn t have money to get more?: No   Transportation Needs: Low Risk  (7/17/2024)    Transportation Needs     Within the past 12 months, has lack of transportation kept you from medical appointments, getting your medicines, non-medical meetings or appointments, work, or from getting things that you need?: No   Physical Activity: Sufficiently Active (7/17/2024)    Exercise Vital Sign     Days of Exercise per Week: 7 days     Minutes of Exercise per Session: 40 min   Stress: No Stress Concern Present (7/17/2024)    [employer] [place] [employer] - Occupational Stress Questionnaire     Feeling of Stress : Not at all   Social Connections: Unknown (7/17/2024)    Social Connection and Isolation Panel [NHANES]     Frequency of Communication with Friends and Family: Not on file     Frequency of Social Gatherings with Friends and Family: Twice a week     Attends [employer] Services: Not on file     Active Member of Clubs or Organizations: Not on file     Attends Club or Organization Meetings: Not on file     Marital Status: Not on file   Interpersonal Safety: Low Risk  (7/18/2024)    Interpersonal Safety     Do you feel physically and emotionally safe where you currently live?: Yes     Within the past 12 months, have you been hit, slapped, kicked or otherwise physically hurt by someone?: No     Within the  "past 12 months, have you been humiliated or emotionally abused in other ways by your partner or ex-partner?: No   Housing Stability: Low Risk  (7/17/2024)    Housing Stability     Do you have housing? : Yes     Are you worried about losing your housing?: No          MEDICATIONS:  has a current medication list which includes the following prescription(s): aspirin, atorvastatin, metoprolol succinate er, ramipril, and tadalafil.    ROS     ROS: 10 point ROS neg other than the symptoms noted above in the HPI.    Physical Exam   VS: /74 (BP Location: Left arm, Patient Position: Chair, Cuff Size: Adult Large)   Pulse 59   Temp 97.3  F (36.3  C) (Tympanic)   Resp 16   Ht 1.727 m (5' 7.99\")   Wt 87.3 kg (192 lb 6.4 oz)   SpO2 98%   BMI 29.26 kg/m    GENERAL: healthy, alert and no distress  EYES: Eyes grossly normal to inspection, conjunctivae and sclerae normal  ENT: no nose swelling, nasal discharge.  Thyroid: no apparent thyroid nodules.  Thyroid appears normal in size and nontender.  CV: RRR, no rubs, gallops, no murmurs  RESP: CTAB, no wheezes, rales, or ronchi  ABDO: +BS  EXTREMITIES: no hand tremors.  NEURO: Cranial nerves grossly intact, mentation intact and speech normal  SKIN: No apparent skin lesions, rash or edema seen   PSYCH: mentation appears normal, affect normal/bright, judgement and insight intact, normal speech and appearance well-groomed    LABS:  TFTs:  Lab Results   Component Value Date    TSH 1.37 10/07/2024    TSH 1.84 02/09/2022    TSH 0.82 05/16/2019       Testosterone:  Component      Latest Ref Rng 10/7/2024  1:45 PM   Free Testosterone Calculated      ng/dL 6.75    Testosterone Total      240 - 950 ng/dL 486    Sex Hormone Binding Globulin      11 - 80 nmol/L 60        All pertinent notes, labs, and images personally reviewed by me.     A/P  Mr.Joseph BLANCO Morse is a 69 year old here for the evaluation of hypogonadism.    1.  Fatigue  Labs showing normal testosterone,TSH and CBC " levels  Not on testosterone replacement.  Clinically looks euthyroid.  Electrolytes are in range.  Vitals are in range.  Denies weight loss or weight gain.  Denies hyperpigmented stretch marks, nausea, vomiting, dizziness, or recurrent fevers or recent illness.  Plan  I discussed that fatigue can be multifactorial.  As his testosterone levels are in normal range, do not recommend testosterone replacement.  I did advise him to get vitamin D levels checked as vitamin D deficiency is also very common.  I am not able to order vitamin D levels With the diagnosis of fatigue and low testosterone levels in EPIC.  He will try to get vitamin D checked.  Primary care provider during annual examination.  Recommend over-the-counter vitamin D supplement.    Discussed indications, risks and benefits of all medications prescribed, and answered questions to patient's satisfaction.  All questions were answered.  The patient indicates understanding of the above issues and agrees with the plan set forth.      Follow-up:  As noted in AVS.    Soumya Antoine MD  Endocrinology   [facility]    CC: Markus Matta

## 2025-02-20 NOTE — LETTER
2/20/2025      Vladimir Morse  [address] [id number]      Dear Colleague,    Thank you for referring your patient, Vladimir Morse, to the [facility]. Please see a copy of my visit note below.    Name: Vladimir Morse  Seen in consultation with Markus Matta PA-C for Low Testosterone/hypogonadism.  HPI:  Vladimir Morse is a 69 year old male who presents for the evaluation of low testosterone.   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), Bowel perforation (H), BPPV (benign paroxysmal positional vertigo), unspecified laterality (07/03/2015), Cerebral infarction (H), Elevated coronary artery calcium score (6/30/2023), Gilbert's syndrome (12/13/2016), Gross hematuria (06/02/2022), Hyperlipidemia LDL goal <100 (11/28/2014), Hypertension (02/22/2018), Lipoma of skin and subcutaneous tissue (06/22/2012), Nephrolithiasis (06/22/2012), Primary osteoarthritis of right knee (12/22/2017), Recurrent intestinal obstruction (H) (07/27/2012), Sensorineural hearing loss (SNHL) of both ears (07/19/2021), Sigmoid diverticulitis (06/21/2006), TIA (transient ischaemic attack), 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    10/2024  he was seen by primary care provider and he was complaining of fatigue.  Following that testosterone levels were checked and was referred to endocrinology.  10/2024 labs--normal testosterone, TSH and CBC    Feels tired X few months     Body mass index is 29.26 kg/m .    Shave-No  Hair Growth/Changes-No  Muscle strength-No  OTC Herbal-No  Previous use of testosterone: NO  Sleeping: not able to fall asleep. Takes naps during day. Mild snoring. No Sleep apnea  Patient feels well at this time and denies any tachycardia, palpitations, heat intolerance, tremor, insomnia, diarrhea, or unexplained weight loss.  Patient also denies  cold  intolerance, constipation, or unexplained weight gain.   Wt Readings from Last 2 Encounters:   02/20/25 87.3 kg (192 lb 6.4 oz)   10/07/24 85 kg (187 lb 8 oz)     PMH/PSH:  Past Medical History:   Diagnosis Date     Abdominal pain, left lower quadrant 06/02/2022     Basilar artery syndrome 02/09/2022     Bowel perforation (H)     as a new born.     BPPV (benign paroxysmal positional vertigo), unspecified laterality 07/03/2015     Cerebral infarction (H)      Elevated coronary artery calcium score 6/30/2023     Gilbert's syndrome 12/13/2016     Gross hematuria 06/02/2022     Hyperlipidemia LDL goal <100 11/28/2014     Hypertension 02/22/2018     Lipoma of skin and subcutaneous tissue 06/22/2012     Nephrolithiasis 06/22/2012     Primary osteoarthritis of right knee 12/22/2017     Recurrent intestinal obstruction (H) 07/27/2012    Resolved with conservative management, again on 6/27/2014 & 5/24/2021.     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     Past Surgical History:   Procedure Laterality Date     ABDOMEN SURGERY  8/2/1956    Perforated bowel     ARTHROSCOPY KNEE Right 10/26/2015    Procedure: ARTHROSCOPY KNEE;  Surgeon: Rodrigue Cole MD;  Location:  OR     AS TOTAL KNEE ARTHROPLASTY Right 03/05/2018     COLONOSCOPY  11/23/2015    Dr. Freed[facility]     COLONOSCOPY N/A 07/13/2021    Procedure: COLONOSCOPY;  Surgeon: Luzmaria Ny MD;  Location:  GI     CV CORONARY ANGIOGRAM N/A 03/21/2022    Procedure: Coronary Angiogram [[id number]];  Surgeon: Oma Panda MD;  Location:  HEART CARDIAC CATH LAB     CYSTOSCOPY, RETROGRADES, INSERT STENT URETER(S), COMBINED  06/29/2012     Procedure: COMBINED CYSTOSCOPY, RETROGRADES, INSERT STENT URETER(S);  LEFT URETEROSCOPY, LEFT URETERAL STENT PLACEMENT;  Surgeon: Timothy Ortega MD;  Location: [facility]     EXCISE LESION BACK Left 09/21/2018    Procedure: EXCISE LESION BACK;  Excision subcutaneous mass and skin lesion left back;  Surgeon: Prosper Loving MD;  Location:  OR. Path: lipoma.     EXTRACORPOREAL SHOCK WAVE LITHOTRIPSY (ESWL)  12/20/2012    Procedure: EXTRACORPOREAL SHOCK WAVE LITHOTRIPSY (ESWL);  LEFT EXTRACORPOREAL SHOCKWAVE LITHOTRIPSY ;  Surgeon: Timothy Ortega MD;  Location:  OR     EYE SURGERY      Cataracts     HC KNEE ARTHROSCOPY, MED+LAT MENISCUS REPAIR Left 01/25/2010    Left knee diagnostic arthroscopy and removal of loose bodies and medial and lateral meniscal debridement.     PERONEAL NERVE DECOMPRESSION Left 02/11/2010    and Excision proximal tibiofibular joint ganglion cyst.     TONSILLECTOMY & ADENOIDECTOMY  1964    T&A as a child     ZZC NONSPECIFIC PROCEDURE  1955    Perforated bowel; infant. Ilio-colonic anastomosis?     Family Hx:  Family History   Problem Relation Age of Onset     Breast Cancer Mother 55     Colon Cancer Father      Myocardial Infarction Father      Skin Cancer Father      Cerebrovascular Disease Brother      Other - See Comments Brother         quadriplegia from an accident     Lung Cancer Brother 66        was a smokder.     Arthritis Brother      No Known Problems Brother      Breast Cancer Sister 65     Nephrolithiasis Son      No Known Problems Son      Other - See Comments Son         hip impingement syndrome.     No Known Problems Daughter        Social Hx:  Social History     Socioeconomic History     Marital status:      Spouse name: Not on file     Number of children: Not on file     Years of education: Not on file     Highest education level: Not on file   Occupational History     Occupation: Retired. Was a   for [employer].   Tobacco Use      Smoking status: Never     Smokeless tobacco: Never   Vaping Use     Vaping status: Never Used   Substance and Sexual Activity     Alcohol use: Yes     Comment: rarely     Drug use: No     Sexual activity: Yes     Partners: Female     Birth control/protection: None   Other Topics Concern     Parent/sibling w/ CABG, MI or angioplasty before 65F 55M? No   Social History Narrative     Not on file     Social Drivers of Health     Financial Resource Strain: Low Risk  (7/17/2024)    Financial Resource Strain      Within the past 12 months, have you or your family members you live with been unable to get utilities (heat, electricity) when it was really needed?: No   Food Insecurity: Low Risk  (7/17/2024)    Food Insecurity      Within the past 12 months, did you worry that your food would run out before you got money to buy more?: No      Within the past 12 months, did the food you bought just not last and you didn t have money to get more?: No   Transportation Needs: Low Risk  (7/17/2024)    Transportation Needs      Within the past 12 months, has lack of transportation kept you from medical appointments, getting your medicines, non-medical meetings or appointments, work, or from getting things that you need?: No   Physical Activity: Sufficiently Active (7/17/2024)    Exercise Vital Sign      Days of Exercise per Week: 7 days      Minutes of Exercise per Session: 40 min   Stress: No Stress Concern Present (7/17/2024)    [employer] [place] [employer] - Occupational Stress Questionnaire      Feeling of Stress : Not at all   Social Connections: Unknown (7/17/2024)    Social Connection and Isolation Panel [NHANES]      Frequency of Communication with Friends and Family: Not on file      Frequency of Social Gatherings with Friends and Family: Twice a week      Attends Church Services: Not on file      Active Member of Clubs or Organizations: Not on file      Attends Club or Organization Meetings: Not on file       "Marital Status: Not on file   Interpersonal Safety: Low Risk  (7/18/2024)    Interpersonal Safety      Do you feel physically and emotionally safe where you currently live?: Yes      Within the past 12 months, have you been hit, slapped, kicked or otherwise physically hurt by someone?: No      Within the past 12 months, have you been humiliated or emotionally abused in other ways by your partner or ex-partner?: No   Housing Stability: Low Risk  (7/17/2024)    Housing Stability      Do you have housing? : Yes      Are you worried about losing your housing?: No          MEDICATIONS:  has a current medication list which includes the following prescription(s): aspirin, atorvastatin, metoprolol succinate er, ramipril, and tadalafil.    ROS     ROS: 10 point ROS neg other than the symptoms noted above in the HPI.    Physical Exam   VS: /74 (BP Location: Left arm, Patient Position: Chair, Cuff Size: Adult Large)   Pulse 59   Temp 97.3  F (36.3  C) (Tympanic)   Resp 16   Ht 1.727 m (5' 7.99\")   Wt 87.3 kg (192 lb 6.4 oz)   SpO2 98%   BMI 29.26 kg/m    GENERAL: healthy, alert and no distress  EYES: Eyes grossly normal to inspection, conjunctivae and sclerae normal  ENT: no nose swelling, nasal discharge.  Thyroid: no apparent thyroid nodules.  Thyroid appears normal in size and nontender.  CV: RRR, no rubs, gallops, no murmurs  RESP: CTAB, no wheezes, rales, or ronchi  ABDO: +BS  EXTREMITIES: no hand tremors.  NEURO: Cranial nerves grossly intact, mentation intact and speech normal  SKIN: No apparent skin lesions, rash or edema seen   PSYCH: mentation appears normal, affect normal/bright, judgement and insight intact, normal speech and appearance well-groomed    LABS:  TFTs:  Lab Results   Component Value Date    TSH 1.37 10/07/2024    TSH 1.84 02/09/2022    TSH 0.82 05/16/2019       Testosterone:  Component      Latest Ref Rng 10/7/2024  1:45 PM   Free Testosterone Calculated      ng/dL 6.75    Testosterone " Total      240 - 950 ng/dL 486    Sex Hormone Binding Globulin      11 - 80 nmol/L 60        All pertinent notes, labs, and images personally reviewed by me.     A/P  .Vladimir Morse is a 69 year old here for the evaluation of hypogonadism.    1.  Fatigue  Labs showing normal testosterone,TSH and CBC levels  Not on testosterone replacement.  Clinically looks euthyroid.  Electrolytes are in range.  Vitals are in range.  Denies weight loss or weight gain.  Denies hyperpigmented stretch marks, nausea, vomiting, dizziness, or recurrent fevers or recent illness.  Plan  I discussed that fatigue can be multifactorial.  As his testosterone levels are in normal range, do not recommend testosterone replacement.  I did advise him to get vitamin D levels checked as vitamin D deficiency is also very common.  I am not able to order vitamin D levels With the diagnosis of fatigue and low testosterone levels in EPIC.  He will try to get vitamin D checked.  Primary care provider during annual examination.  Recommend over-the-counter vitamin D supplement.    Discussed indications, risks and benefits of all medications prescribed, and answered questions to patient's satisfaction.  All questions were answered.  The patient indicates understanding of the above issues and agrees with the plan set forth.      Follow-up:  As noted in AVS.    Soumya Antoine MD  Endocrinology   [facility]    CC: Markus Matta      Again, thank you for allowing me to participate in the care of your patient.        Sincerely,        Soumya Antoine MD    Electronically signed

## 2025-06-06 ENCOUNTER — TRANSFERRED RECORDS (OUTPATIENT)
Dept: HEALTH INFORMATION MANAGEMENT | Facility: CLINIC | Age: 70
End: 2025-06-06
Payer: COMMERCIAL

## 2025-06-18 ENCOUNTER — PATIENT OUTREACH (OUTPATIENT)
Dept: CARE COORDINATION | Facility: CLINIC | Age: 70
End: 2025-06-18
Payer: COMMERCIAL

## 2025-07-02 ENCOUNTER — OFFICE VISIT (OUTPATIENT)
Dept: URGENT CARE | Facility: URGENT CARE | Age: 70
End: 2025-07-02
Payer: COMMERCIAL

## 2025-07-02 VITALS
HEIGHT: 68 IN | BODY MASS INDEX: 28.49 KG/M2 | WEIGHT: 188 LBS | OXYGEN SATURATION: 100 % | HEART RATE: 59 BPM | RESPIRATION RATE: 18 BRPM | SYSTOLIC BLOOD PRESSURE: 134 MMHG | DIASTOLIC BLOOD PRESSURE: 80 MMHG | TEMPERATURE: 97.5 F

## 2025-07-02 DIAGNOSIS — J22 LOWER RESPIRATORY INFECTION: Primary | ICD-10-CM

## 2025-07-02 PROCEDURE — 3075F SYST BP GE 130 - 139MM HG: CPT | Performed by: NURSE PRACTITIONER

## 2025-07-02 PROCEDURE — 99213 OFFICE O/P EST LOW 20 MIN: CPT | Performed by: NURSE PRACTITIONER

## 2025-07-02 PROCEDURE — 3079F DIAST BP 80-89 MM HG: CPT | Performed by: NURSE PRACTITIONER

## 2025-07-02 RX ORDER — BENZONATATE 200 MG/1
200 CAPSULE ORAL 3 TIMES DAILY PRN
Qty: 21 CAPSULE | Refills: 0 | Status: SHIPPED | OUTPATIENT
Start: 2025-07-02 | End: 2025-07-09

## 2025-07-02 RX ORDER — AZITHROMYCIN 250 MG/1
TABLET, FILM COATED ORAL
Qty: 6 TABLET | Refills: 0 | Status: SHIPPED | OUTPATIENT
Start: 2025-07-02 | End: 2025-07-07

## 2025-07-02 NOTE — PROGRESS NOTES
Urgent Care Clinic Visit    Chief Complaint   Patient presents with    Urgent Care    Cough     X1 month of a cough                7/2/2025    10:03 AM   Additional Questions   Roomed by carlos webber

## 2025-07-02 NOTE — PATIENT INSTRUCTIONS
Zpak and tessalon perles for lower resp infection given chronicity  Pt will hold on x-ray CBC here today  Rest! Your body needs more rest to heal.  Drink plenty of fluids (warm fluids like tea or soup are soothing and reduce cough)  Sit in the bathroom with a hot shower running and breathe in the steam.  Honey may soothe your sore throat and help manage your cough- may take straight or in warm water with lemon juice.  Avoid smoke (cigarettes, bonfires, fireplace, wood burning stoves).  Take Tylenol as needed for pain.  Delsym (dextromethorphan polistirex) is an over the counter cough medication that lasts 12 hours.   Mucinex or Robitussin (guiafenesin) thin mucus and may help it to loosen more quickly  Good handwashing is the best way to prevent spread of germs  Present to emergency room if you develop trouble breathing, swallowing or cough-up blood.  Follow up with your primary care provider if symptoms worsen or fail to improve as expected.

## 2025-07-02 NOTE — PROGRESS NOTES
Chief Complaint   Patient presents with    Urgent Care    Cough     X1 month of a cough      SUBJECTIVE:  Vladimir Morse is a 69 year old male presenting with a dry deep cough for 1 month lingering. Started as a cold with congestion sneezing myalgias and now has settled into the lungs.  Declines chest pain shortness of breath bloody sputum fevers.  No known sick exposures.  Declines asthma or smoking.    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    BPPV (benign paroxysmal positional vertigo), unspecified laterality 07/03/2015    Cerebral infarction (H)     Elevated coronary artery calcium score 6/30/2023    Gilbert's syndrome 12/13/2016    Gross hematuria 06/02/2022    Hyperlipidemia LDL goal <100 11/28/2014    Hypertension 02/22/2018    Lipoma of skin and subcutaneous tissue 06/22/2012    Nephrolithiasis 06/22/2012    Primary osteoarthritis of right knee 12/22/2017    Recurrent intestinal obstruction (H) 07/27/2012    Resolved with conservative management, again on 6/27/2014 & 5/24/2021.    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     Current Outpatient Medications   Medication Sig Dispense Refill    aspirin 81 MG EC tablet Take 81 mg by mouth daily      atorvastatin (LIPITOR) 20 MG tablet Take 1 tablet (20 mg) by mouth daily. 90 tablet 2    azithromycin (ZITHROMAX) 250 MG tablet Take 2 tablets (500 mg) by mouth daily for 1 day, THEN 1 tablet (250 mg) daily for 4 days. 6 tablet 0    benzonatate (TESSALON) 200 MG capsule Take 1 capsule (200 mg) by mouth 3 times daily as  "needed for cough. 21 capsule 0    metoprolol succinate ER (TOPROL XL) 25 MG 24 hr tablet Take 0.5 tablets (12.5 mg) by mouth daily. 45 tablet 3    ramipril (ALTACE) 10 MG capsule Take 1 capsule (10 mg) by mouth daily. 90 capsule 2    tadalafil (CIALIS) 5 MG tablet Take 1 tablet (5 mg) by mouth every 24 hours. 90 tablet 1     No current facility-administered medications for this visit.     Social History     Tobacco Use    Smoking status: Never    Smokeless tobacco: Never   Substance Use Topics    Alcohol use: Yes     Comment: rarely     Allergies   Allergen Reactions    No Known Drug Allergy      Review of Systems   ROS: 10 point ROS neg other than the symptoms noted above in the HPI.    OBJECTIVE:   /80   Pulse 59   Temp 97.5  F (36.4  C) (Temporal)   Resp 18   Ht 1.727 m (5' 8\")   Wt 85.3 kg (188 lb)   SpO2 100%   BMI 28.59 kg/m      Physical Exam  Vitals reviewed.   Constitutional:       General: He is not in acute distress.     Appearance: Normal appearance. He is not ill-appearing, toxic-appearing or diaphoretic.   HENT:      Head: Normocephalic and atraumatic.      Right Ear: Tympanic membrane and ear canal normal.      Left Ear: Tympanic membrane and ear canal normal.      Nose: Nose normal.      Mouth/Throat:      Mouth: Mucous membranes are moist.      Pharynx: Oropharynx is clear.   Eyes:      Extraocular Movements: Extraocular movements intact.      Conjunctiva/sclera: Conjunctivae normal.      Pupils: Pupils are equal, round, and reactive to light.   Cardiovascular:      Rate and Rhythm: Normal rate.      Pulses: Normal pulses.   Pulmonary:      Effort: Respiratory distress (deep cough) present.      Breath sounds: No stridor. No wheezing, rhonchi or rales.   Chest:      Chest wall: No tenderness.   Musculoskeletal:         General: Normal range of motion.      Cervical back: Normal range of motion and neck supple.   Skin:     General: Skin is warm and dry.      Coloration: Skin is not pale. "      Findings: No rash.   Neurological:      General: No focal deficit present.      Mental Status: He is alert and oriented to person, place, and time.      Motor: No weakness.      Gait: Gait normal.   Psychiatric:         Mood and Affect: Mood normal.         Behavior: Behavior normal.       ASSESSMENT:    ICD-10-CM    1. Lower respiratory infection  J22 azithromycin (ZITHROMAX) 250 MG tablet     benzonatate (TESSALON) 200 MG capsule        PLAN:     Zpak and tessalon perles for lower resp infection given chronicity  Pt will hold on x-ray CBC here today  Rest! Your body needs more rest to heal.  Drink plenty of fluids (warm fluids like tea or soup are soothing and reduce cough)  Sit in the bathroom with a hot shower running and breathe in the steam.  Honey may soothe your sore throat and help manage your cough- may take straight or in warm water with lemon juice.  Avoid smoke (cigarettes, bonfires, fireplace, wood burning stoves).  Take Tylenol as needed for pain.  Delsym (dextromethorphan polistirex) is an over the counter cough medication that lasts 12 hours.   Mucinex or Robitussin (guiafenesin) thin mucus and may help it to loosen more quickly  Good handwashing is the best way to prevent spread of germs  Present to emergency room if you develop trouble breathing, swallowing or cough-up blood.  Follow up with your primary care provider if symptoms worsen or fail to improve as expected.    Follow up with primary care provider with any problems, questions or concerns or if symptoms worsen or fail to improve. Patient agreed to plan and verbalized understanding.    CARLIN White-BC  [facility]

## 2025-07-05 DIAGNOSIS — I67.1 CEREBRAL ANEURYSM: ICD-10-CM

## 2025-07-05 DIAGNOSIS — I10 ESSENTIAL HYPERTENSION, BENIGN: ICD-10-CM

## 2025-07-05 DIAGNOSIS — E78.00 PURE HYPERCHOLESTEROLEMIA: ICD-10-CM

## 2025-07-05 RX ORDER — RAMIPRIL 10 MG/1
10 CAPSULE ORAL DAILY
Qty: 90 CAPSULE | Refills: 0 | Status: SHIPPED | OUTPATIENT
Start: 2025-07-05

## 2025-07-05 RX ORDER — ATORVASTATIN CALCIUM 20 MG/1
20 TABLET, FILM COATED ORAL DAILY
Qty: 90 TABLET | Refills: 0 | Status: SHIPPED | OUTPATIENT
Start: 2025-07-05

## (undated) DEVICE — SLEEVE TR BAND RADIAL COMPRESSION DEVICE 24CM TRB24-REG

## (undated) DEVICE — KIT HAND CONTROL ANGIOTOUCH ACIST 65CM AT-P65

## (undated) DEVICE — DRAPE LAP PEDS DISP 29492

## (undated) DEVICE — LINEN DRAPE 54X72" 5467

## (undated) DEVICE — CATH ANGIO INFINITI JR4 4FRX100CM 538421

## (undated) DEVICE — ESU GROUND PAD ADULT W/CORD E7507

## (undated) DEVICE — LINEN HALF SHEET 5512

## (undated) DEVICE — RAD G/W INQWIRE .035X260CM J-TIP EXCHANGE IQ35F260J1O5RS

## (undated) DEVICE — PACK MINOR CUSTOM RIDGES SBA32RMRMA

## (undated) DEVICE — SOL ADH LIQUID BENZOIN SWAB 0.6ML C1544

## (undated) DEVICE — INTRO GLIDESHEATH SLENDER 6FR 10X45CM 60-1060

## (undated) DEVICE — BAG CLEAR TRASH 1.3M 39X33" P4040C

## (undated) DEVICE — DEFIB PRO-PADZ LVP LQD GEL ADULT 8900-2105-01

## (undated) DEVICE — DRSG GAUZE 4X4" 8044

## (undated) DEVICE — LINEN TOWEL PACK X10 5473

## (undated) DEVICE — 5FR X 100CM INFINITI TL DIAGNOSTIC CATHETER, JUDKINS LEFT CORONARY, JL 3.5, FEMORAL SELECTIVE THRULUMEN, SMALL, 0.038IN MAX GUIDEWIRE (EA/1)

## (undated) DEVICE — MANIFOLD KIT ANGIO AUTOMATED 014613

## (undated) DEVICE — DRSG STERI STRIP 1/2X4" R1547

## (undated) DEVICE — SOL NACL 0.9% IRRIG 1000ML BOTTLE 2F7124

## (undated) DEVICE — TOTE ANGIO CORP PC15AT SAN32CC83O

## (undated) DEVICE — GLOVE PROTEXIS BLUE W/NEU-THERA 8.0  2D73EB80

## (undated) DEVICE — PREP SKIN SCRUB TRAY 4461A

## (undated) DEVICE — PREP SCRUB SOL EXIDINE 4% CHG 4OZ 29002-404

## (undated) DEVICE — SYR ANGIOGRAPHY MULTIUSE KIT ACIST 014612

## (undated) DEVICE — SU PDS II 4-0 PS-2 18" Z496G

## (undated) DEVICE — LINEN FULL SHEET 5511

## (undated) DEVICE — SU PDS II 2-0 SH 27" Z317H

## (undated) DEVICE — KIT ENDO TURNOVER/PROCEDURE W/CLEAN A SCOPE LINERS 103888

## (undated) DEVICE — LINEN TOWEL PACK X5 5464

## (undated) DEVICE — LINE MONITOR NASAL SMART CAPNOLINE ADULT LONG 12463

## (undated) DEVICE — NDL 22GA 1.5"

## (undated) DEVICE — GLIDEWIRE TERUMO .035X180CM 1.5,, J-TIP GR3525

## (undated) DEVICE — GLOVE PROTEXIS W/NEU-THERA 7.5  2D73TE75

## (undated) DEVICE — BNDG ABDOMINAL BINDER 9X45-62" 79-89071

## (undated) RX ORDER — BUPIVACAINE HYDROCHLORIDE AND EPINEPHRINE 2.5; 5 MG/ML; UG/ML
INJECTION, SOLUTION EPIDURAL; INFILTRATION; INTRACAUDAL; PERINEURAL
Status: DISPENSED
Start: 2018-09-21

## (undated) RX ORDER — GLYCOPYRROLATE 0.2 MG/ML
INJECTION INTRAMUSCULAR; INTRAVENOUS
Status: DISPENSED
Start: 2018-09-21

## (undated) RX ORDER — NITROGLYCERIN 0.4 MG/1
TABLET SUBLINGUAL
Status: DISPENSED
Start: 2022-03-08

## (undated) RX ORDER — DEXAMETHASONE SODIUM PHOSPHATE 4 MG/ML
INJECTION, SOLUTION INTRA-ARTICULAR; INTRALESIONAL; INTRAMUSCULAR; INTRAVENOUS; SOFT TISSUE
Status: DISPENSED
Start: 2018-09-21

## (undated) RX ORDER — FENTANYL CITRATE 50 UG/ML
INJECTION, SOLUTION INTRAMUSCULAR; INTRAVENOUS
Status: DISPENSED
Start: 2018-09-21

## (undated) RX ORDER — NEOSTIGMINE METHYLSULFATE 1 MG/ML
VIAL (ML) INJECTION
Status: DISPENSED
Start: 2018-09-21

## (undated) RX ORDER — VERAPAMIL HYDROCHLORIDE 2.5 MG/ML
INJECTION, SOLUTION INTRAVENOUS
Status: DISPENSED
Start: 2022-03-21

## (undated) RX ORDER — FENTANYL CITRATE 50 UG/ML
INJECTION, SOLUTION INTRAMUSCULAR; INTRAVENOUS
Status: DISPENSED
Start: 2021-07-13

## (undated) RX ORDER — LIDOCAINE HYDROCHLORIDE 10 MG/ML
INJECTION, SOLUTION EPIDURAL; INFILTRATION; INTRACAUDAL; PERINEURAL
Status: DISPENSED
Start: 2022-03-21

## (undated) RX ORDER — PROPOFOL 10 MG/ML
INJECTION, EMULSION INTRAVENOUS
Status: DISPENSED
Start: 2018-09-21

## (undated) RX ORDER — SIMETHICONE 40MG/0.6ML
SUSPENSION, DROPS(FINAL DOSAGE FORM)(ML) ORAL
Status: DISPENSED
Start: 2021-07-13

## (undated) RX ORDER — METOPROLOL TARTRATE 50 MG
TABLET ORAL
Status: DISPENSED
Start: 2022-03-08

## (undated) RX ORDER — LIDOCAINE HYDROCHLORIDE 10 MG/ML
INJECTION, SOLUTION EPIDURAL; INFILTRATION; INTRACAUDAL; PERINEURAL
Status: DISPENSED
Start: 2018-09-21

## (undated) RX ORDER — HEPARIN SODIUM 200 [USP'U]/100ML
INJECTION, SOLUTION INTRAVENOUS
Status: DISPENSED
Start: 2022-03-21

## (undated) RX ORDER — ONDANSETRON 2 MG/ML
INJECTION INTRAMUSCULAR; INTRAVENOUS
Status: DISPENSED
Start: 2018-09-21

## (undated) RX ORDER — IVABRADINE 5 MG/1
TABLET, FILM COATED ORAL
Status: DISPENSED
Start: 2022-03-08

## (undated) RX ORDER — FENTANYL CITRATE 50 UG/ML
INJECTION, SOLUTION INTRAMUSCULAR; INTRAVENOUS
Status: DISPENSED
Start: 2022-03-21

## (undated) RX ORDER — OXYCODONE HYDROCHLORIDE 5 MG/1
TABLET ORAL
Status: DISPENSED
Start: 2018-09-21

## (undated) RX ORDER — HEPARIN SODIUM 1000 [USP'U]/ML
INJECTION, SOLUTION INTRAVENOUS; SUBCUTANEOUS
Status: DISPENSED
Start: 2022-03-21

## (undated) RX ORDER — NITROGLYCERIN 5 MG/ML
VIAL (ML) INTRAVENOUS
Status: DISPENSED
Start: 2022-03-21

## (undated) RX ORDER — CEFAZOLIN SODIUM 2 G/100ML
INJECTION, SOLUTION INTRAVENOUS
Status: DISPENSED
Start: 2018-09-21